# Patient Record
Sex: MALE | Race: WHITE | Employment: OTHER | URBAN - METROPOLITAN AREA
[De-identification: names, ages, dates, MRNs, and addresses within clinical notes are randomized per-mention and may not be internally consistent; named-entity substitution may affect disease eponyms.]

---

## 2017-01-06 DIAGNOSIS — Z95.810 ICD (IMPLANTABLE CARDIOVERTER-DEFIBRILLATOR) IN PLACE: ICD-10-CM

## 2017-01-06 DIAGNOSIS — E78.5 HYPERLIPIDEMIA, UNSPECIFIED HYPERLIPIDEMIA TYPE: ICD-10-CM

## 2017-01-06 DIAGNOSIS — I25.10 CARDIOVASCULAR DISEASE: ICD-10-CM

## 2017-01-06 DIAGNOSIS — I25.10 ATHEROSCLEROSIS OF NATIVE CORONARY ARTERY OF NATIVE HEART WITHOUT ANGINA PECTORIS: ICD-10-CM

## 2017-01-06 DIAGNOSIS — I47.29 PAROXYSMAL VENTRICULAR TACHYCARDIA: ICD-10-CM

## 2017-01-06 DIAGNOSIS — I42.9 CARDIOMYOPATHY (HCC): ICD-10-CM

## 2017-01-06 DIAGNOSIS — I25.9 ISCHEMIC HEART DISEASE: ICD-10-CM

## 2017-01-06 DIAGNOSIS — I47.1 PAT (PAROXYSMAL ATRIAL TACHYCARDIA) (HCC): ICD-10-CM

## 2017-01-06 DIAGNOSIS — I49.01 VENTRICULAR FIBRILLATION (HCC): ICD-10-CM

## 2017-01-06 DIAGNOSIS — I46.9 CARDIAC ARREST (HCC): ICD-10-CM

## 2017-01-06 RX ORDER — NITROGLYCERIN 400 UG/1
1 SPRAY ORAL
Qty: 1 BOTTLE | Refills: 3 | Status: SHIPPED | OUTPATIENT
Start: 2017-01-06 | End: 2018-01-05 | Stop reason: SDUPTHER

## 2017-02-02 DIAGNOSIS — I42.9 CARDIOMYOPATHY (HCC): ICD-10-CM

## 2017-02-02 DIAGNOSIS — I46.9 CARDIAC ARREST (HCC): ICD-10-CM

## 2017-02-02 DIAGNOSIS — I25.9 ISCHEMIC HEART DISEASE: ICD-10-CM

## 2017-02-02 DIAGNOSIS — I25.10 ATHEROSCLEROSIS OF NATIVE CORONARY ARTERY WITHOUT ANGINA PECTORIS, UNSPECIFIED WHETHER NATIVE OR TRANSPLANTED HEART: ICD-10-CM

## 2017-02-02 DIAGNOSIS — I49.01 VENTRICULAR FIBRILLATION (HCC): ICD-10-CM

## 2017-02-02 DIAGNOSIS — I25.10 CARDIOVASCULAR DISEASE: ICD-10-CM

## 2017-02-02 DIAGNOSIS — I47.29 PAROXYSMAL VENTRICULAR TACHYCARDIA: ICD-10-CM

## 2017-02-02 DIAGNOSIS — E78.5 HYPERLIPIDEMIA, UNSPECIFIED HYPERLIPIDEMIA TYPE: ICD-10-CM

## 2017-02-02 DIAGNOSIS — Z95.810 ICD (IMPLANTABLE CARDIOVERTER-DEFIBRILLATOR) IN PLACE: ICD-10-CM

## 2017-02-02 DIAGNOSIS — I47.1 PAT (PAROXYSMAL ATRIAL TACHYCARDIA) (HCC): ICD-10-CM

## 2017-02-02 RX ORDER — CLOPIDOGREL BISULFATE 75 MG/1
75 TABLET ORAL DAILY
Qty: 90 TAB | Refills: 1 | Status: SHIPPED | OUTPATIENT
Start: 2017-02-02 | End: 2017-08-02 | Stop reason: SDUPTHER

## 2017-02-02 NOTE — TELEPHONE ENCOUNTER
Requested Prescriptions     Signed Prescriptions Disp Refills    clopidogrel (PLAVIX) 75 mg tab 90 Tab 1     Sig: Take 1 Tab by mouth daily. Please make follow up appointment in March/2017  with Dr. John To. Authorizing Provider: Tanisha Calvo     Ordering User: Adams Harmon     Verbal order per Dr. Milan Snow.

## 2017-02-15 ENCOUNTER — CLINICAL SUPPORT (OUTPATIENT)
Dept: CARDIOLOGY CLINIC | Age: 67
End: 2017-02-15

## 2017-02-15 ENCOUNTER — OFFICE VISIT (OUTPATIENT)
Dept: CARDIOLOGY CLINIC | Age: 67
End: 2017-02-15

## 2017-02-15 VITALS
WEIGHT: 208 LBS | HEIGHT: 70 IN | BODY MASS INDEX: 29.78 KG/M2 | SYSTOLIC BLOOD PRESSURE: 140 MMHG | HEART RATE: 60 BPM | DIASTOLIC BLOOD PRESSURE: 82 MMHG

## 2017-02-15 DIAGNOSIS — Z95.1 HX OF CABG: ICD-10-CM

## 2017-02-15 DIAGNOSIS — I25.5 ISCHEMIC CARDIOMYOPATHY: ICD-10-CM

## 2017-02-15 DIAGNOSIS — I47.29 PAROXYSMAL VENTRICULAR TACHYCARDIA: ICD-10-CM

## 2017-02-15 DIAGNOSIS — J43.8 OTHER EMPHYSEMA (HCC): ICD-10-CM

## 2017-02-15 DIAGNOSIS — I47.1 PAT (PAROXYSMAL ATRIAL TACHYCARDIA) (HCC): ICD-10-CM

## 2017-02-15 DIAGNOSIS — Z85.118 HX OF CANCER OF LUNG: ICD-10-CM

## 2017-02-15 DIAGNOSIS — I25.10 ATHEROSCLEROSIS OF NATIVE CORONARY ARTERY OF NATIVE HEART WITHOUT ANGINA PECTORIS: ICD-10-CM

## 2017-02-15 DIAGNOSIS — Z95.810 PRESENCE OF AUTOMATIC CARDIOVERTER/DEFIBRILLATOR (AICD): Primary | ICD-10-CM

## 2017-02-15 DIAGNOSIS — Z95.810 ICD (IMPLANTABLE CARDIOVERTER-DEFIBRILLATOR) IN PLACE: Primary | ICD-10-CM

## 2017-02-15 NOTE — PROGRESS NOTES
Cardiac Electrophysiology Office Note     Subjective: Piper Martin is a 69 y.o. man with hx of VT on ICD   He had felt dyspnea when seeing Dr Monica Fagan test 10/2016 read by Dr Sasha Garber, mostly scars and LVEF 42%  He used steroid for COPD and got better    stress test 2/2010: mild rev ischemia in inferolateral wall mixed with mainly fixed defect, LVEF 49%  A self terminating episode of VT (in VF zone) in June, 2012 seen on AICD  He has been on sotalol and the last ICD check in January did not show ventricular tachycardia but rather he had several episodes of nonsustained atrial tachycardia and the longest episode was about 10 seconds. He was completely unaware of the atrial tachycardia episode. No chest pain, shortness of breath, or limitation in functional capacity. His NYHA class is I. There is no orthopnea or PND. The patient has not perceived an appropriate or inappropriate ICD shock since the last visit     ICD [place] [place] lead was examined under fluoroscopy. There was no evidence of lead breakdown. There is no evidence of electrical noises so far on the lead. This ICD lead is on recall.                           Record from Dr Alek Villavicencio: 3 cm left upper lobe lung cancer stage IB  ICD Riata lead with  KRISTEN 3/3/2010        Problem List  Date Reviewed: 2/15/2017          Codes Class Noted    Lung cancer, upper lobe ([facility] [address] ICD-10-CM: C34.10  ICD-9-CM: 162.3  9/27/2015    Overview Signed 9/27/2015  5:49 PM by Flavia Macedo MD      3 cm left upper lobe lung cancer stage IB 9/2015             PAT (paroxysmal atrial tachycardia) (HCC) ICD-10-CM: I47.1  ICD-9-CM: 427.0  Unknown        Encounter for long-term (current) use of high-risk medication ICD-10-CM: E12.053  ICD-9-CM: V58.69  7/27/2012    Overview Signed 7/27/2012  9:07 AM by Flavia Macedo MD     sotalol             Coronary atherosclerosis of native coronary artery ICD-10-CM: I25.10  ICD-9-CM: 414.01  4/26/2012    Overview Signed 10/3/2016 12:13 PM by Ryne Kemp MD     2/10 lexiscan cardiolyte, mostly fixed inferolateral defect. lvef 49%             Cardiomyopathy ([facility] [address] ICD-10-CM: I42.9  ICD-9-CM: 425.4  4/26/2012        Unspecified cardiovascular disease ICD-10-CM: I25.10  ICD-9-CM: 429.2  9/14/2010    Overview Signed 10/13/2010 12:52 PM by Angel Guillen MD     Mr. Dunaway Favorite cardiac history dates back to 1995 when he presented with unstable angina while living in [place] and underwent balloon angioplasty-vessel unknown. In June 2004, he had a cardiac arrest associated with an acute lateral MI. Cardiac catheterization at that time showed severe two vessel coronary disease and he had stents placed in both his obtuse marginal, and a week later in his right coronary artery. In January 2005, he had recurrent symptoms in association with an abnormal stress test and had a drug-eluting stent placed within the previously stented segment of his obtuse marginal branch. Last cardiac catheterization was in July 2005, and it showed him to have stable anatomy. He had an AICD implanted in 2004 after his out of hospital arrest. Repeat cath 2/10 show instent restenosis rca, treated at that time with BARB. LVEF 2/10 40-45%. Cardiac arrest [facility]) ICD-10-CM: I46.9  ICD-9-CM: 427.5  9/14/2010        Other specified forms of chronic ischemic heart disease ICD-10-CM: I25.89  ICD-9-CM: 414.8  9/14/2010        Automatic implantable cardiac defibrillator in situ ICD-10-CM: Z95.810  ICD-9-CM: V45.02  9/14/2010        Hyperlipidemia, unspecified ICD-10-CM: E78.5  ICD-9-CM: 272.4  9/14/2010        Paroxysmal ventricular tachycardia (HCC) ICD-10-CM: I47.2  ICD-9-CM: 427.1  9/14/2010        Ventricular fibrillation (HCC) ICD-10-CM: I49.01  ICD-9-CM: 427.41  9/14/2010                Current Outpatient Prescriptions   Medication Sig Dispense Refill    clopidogrel (PLAVIX) 75 mg tab Take 1 Tab by mouth daily.  Please make follow up appointment in March/2017  with Dr. Allen Willis. 90 Tab 1    nitroglycerin (NITROLINGUAL) 400 mcg/spray spray 1 Spray by SubLINGual route every five (5) minutes as needed. 1 Bottle 3    azelastine-fluticasone (DYMISTA) 137-50 mcg/spray spry by Nasal route.  sotalol (BETAPACE) 120 mg tablet Take 1 Tab by mouth two (2) times a day. 180 Tab 1    atorvastatin (LIPITOR) 80 mg tablet Take 1 Tab by mouth daily. 90 Tab 3    fluticasone-salmeterol (ADVAIR) 500-50 mcg/dose diskus inhaler Take 1 Puff by inhalation every twelve (12) hours.  docusate sodium (COLACE) 100 mg capsule Take 100 mg by mouth two (2) times a day.  tiotropium (SPIRIVA) 18 mcg inhalation capsule Take 2 Caps by inhalation daily.  tamsulosin (FLOMAX) 0.4 mg capsule Take 0.4 mg by mouth daily.  OTHER,NON-FORMULARY, Take 1 g by mouth every evening. Indications: 2 units every evening for immune support      B.infantis-B.ani-B.long-B.bifi (PROBIOTIC 4X) 10-15 mg TbEC Take  by mouth two (2) times a day.  omega-3 fatty acids-vitamin e (FISH OIL) 1,000 mg cap Take 1 Cap by mouth daily. 90 Cap 3    aspirin 81 mg tablet Take 81 mg by mouth daily.  zolpidem (AMBIEN) 10 mg tablet Take  by mouth nightly as needed.  multivitamin (ONE A DAY) tablet Take 1 Tab by mouth daily.        No Known Allergies  Past Medical History   Diagnosis Date    Automatic implantable cardiac defibrillator in situ 9/14/2010    Cardiac arrest ([address] 9/14/2010    ICD (implantable cardiac defibrillator) in place     Other and unspecified hyperlipidemia 9/14/2010    Other specified forms of chronic ischemic heart disease 9/14/2010    Paroxysmal ventricular tachycardia ([address] 9/14/2010    PAT (paroxysmal atrial tachycardia) ([address]     Unspecified cardiovascular disease 9/14/2010    Ventricular fibrillation ([address] 9/14/2010     Past Surgical History   Procedure Laterality Date    Hx cholecystectomy       6/07    Pr nasal surg proc unlisted       polyps and deviated septum    Hx heart catheterization      Xr fluoroscopy under 60 minutes  10/3/2012             Social History   Substance Use Topics    Smoking status: Former Smoker     Quit date: 7/12/2004    Smokeless tobacco: Never Used    Alcohol use No      Comment: occasional        Review of Systems:   Constitutional: Negative for fever, chills, weight loss, malaise/fatigue. HEENT: Negative for nosebleeds, vision changes. Respiratory: Negative for cough, and wheezing. Cardiovascular: Negative for chest pain, palpitations, orthopnea, claudication, leg swelling, syncope, and PND. Gastrointestinal: Negative for nausea, vomiting, diarrhea, melena. Genitourinary: Negative for hematuria. Musculoskeletal: Negative for myalgias, arthralgia. Skin: Negative for rash. Heme: Does not bleed or bruise easily. Neurological: Negative for speech change and focal weakness     Objective:     Visit Vitals    /82 (BP 1 Location: Right arm, BP Patient Position: Sitting)    Pulse 60    Ht 5' 10\" (1.778 m)    Wt 208 lb (94.3 kg)    BMI 29.84 kg/m2      Physical Exam:   Constitutional: well-developed and well-nourished. No distress. Head: Normocephalic and atraumatic. Eyes: Pupils are equal, round  Neck: supple. No JVD present. Cardiovascular: Normal rate, regular rhythm and normal heart sounds. Exam reveals no gallop and no friction rub. No murmur heard. Pulmonary/Chest: Effort normal and breath sounds normal. No wheezes. Abdominal: Soft, obese  Musculoskeletal: no edema. Neurological: alert,oriented. Skin: Skin is warm and dry at ICD site  Psychiatric: normal mood and affect. Behavior is normal. Judgment and thought content normal.      ECG atrial pacing  ventricular sensing    Assessment/Plan:       ICD-10-CM ICD-9-CM    1. ICD (implantable cardioverter-defibrillator) in place Z95.810 V45.02    2.  Atherosclerosis of native coronary artery of native heart without angina pectoris I25.10 414.01 AMB POC EKG ROUTINE W/ 12 LEADS, INTER & REP   3. PAT (paroxysmal atrial tachycardia) (HCC) I47.1 427.0    4. Paroxysmal ventricular tachycardia (HCC) I47.2 427.1    5. Hx of CABG Z95.1 V45.81    6. Ischemic cardiomyopathy I25.5 414.8    7. Other emphysema ([address] J43.8 492.8    8. Hx of cancer of lung Z85. 118 V10.11           The patient will get medication optimization when seeing Dr Fátima Villarreal  He thought his LVEF was lower and may not be accurate  He has not had arrhythmia  Used to have PVCs and PAT  He wants to stay on sotalol  He gets disability from [place] from agent orange exposure in [facility].  COPD is dx by his pulmonologist  patient understands and would like to continue to monitor in office as usual.  He does not want ICD Riata lead extracted and replaced at this time  His ICD will be checked today and he will replace when needed  His battery of ICD is about 1.5 years left    Follow-up Disposition:  Return in about 1 year (around 2/15/2018). Yasemin Horton M.D.   Electrophysiology/Cardiology  [facility] [place]  [address] , [address]  [phone]

## 2017-02-15 NOTE — MR AVS SNAPSHOT
Visit Information Date & Time Provider Department Dept. Phone Encounter #  
 2/15/2017 11:00 AM Peter Boyer MD [facility] [place] [facility] [phone] [id number] Follow-up Instructions Return in about 1 year (around 2/15/2018). Upcoming Health Maintenance Date Due Hepatitis C Screening 1950 DTaP/Tdap/Td series (1 - Tdap) 2/6/1969 FOBT Q 1 YEAR AGE 50-75 2/6/1998 ZOSTER VACCINE AGE 60> 2/6/2008 GLAUCOMA SCREENING Q2Y 2/6/2013 Pneumococcal 65+ High/Highest Risk (1 of 2 - PCV13) 2/6/2013 MEDICARE YEARLY EXAM 2/6/2013 INFLUENZA AGE 9 TO ADULT 8/1/2016 Allergies as of 2/15/2017  Review Complete On: 2/15/2017 By: Peter Boyer MD  
 No Known Allergies Current Immunizations  Never Reviewed No immunizations on file. Not reviewed this visit You Were Diagnosed With   
  
 Codes Comments ICD (implantable cardioverter-defibrillator) in place    -  Primary ICD-10-CM: Z95.810 ICD-9-CM: V45.02 Atherosclerosis of native coronary artery of native heart without angina pectoris     ICD-10-CM: I25.10 ICD-9-CM: 414.01   
 PAT (paroxysmal atrial tachycardia) (HCC)     ICD-10-CM: I47.1 ICD-9-CM: 427.0 Paroxysmal ventricular tachycardia (HCC)     ICD-10-CM: I47.2 ICD-9-CM: 427.1 Hx of CABG     ICD-10-CM: Z95.1 ICD-9-CM: V45.81 Ischemic cardiomyopathy     ICD-10-CM: I25.5 ICD-9-CM: 414.8 Other emphysema ([facility] [address]     ICD-10-CM: J43.8 ICD-9-CM: 492.8 Hx of cancer of lung     ICD-10-CM: Z85.118 
ICD-9-CM: V10.11 Vitals BP Pulse Height(growth percentile) Weight(growth percentile) BMI Smoking Status 140/82 (BP 1 Location: Right arm, BP Patient Position: Sitting) 60 5' 10\" (1.778 m) 208 lb (94.3 kg) 29.84 kg/m2 Former Smoker Vitals History BMI and BSA Data Body Mass Index Body Surface Area  
 29.84 kg/m 2 2.16 m 2 [facility] [phone] Your Updated Medication List  
  
   
This list is accurate as of: 2/15/17 11:12 AM.  Always use your most recent med list.  
  
  
  
  
 AMBIEN 10 mg tablet Generic drug:  zolpidem Take  by mouth nightly as needed. aspirin 81 mg tablet Take 81 mg by mouth daily. atorvastatin 80 mg tablet Commonly known as:  LIPITOR Take 1 Tab by mouth daily. clopidogrel 75 mg Tab Commonly known as:  PLAVIX Take 1 Tab by mouth daily. Please make follow up appointment in March/2017  with Dr. Anabel Baird. docusate sodium 100 mg capsule Commonly known as:  [place]ermell Nones Take 100 mg by mouth two (2) times a day. DYMISTA 137-50 mcg/spray [place] Generic drug:  azelastine-fluticasone  
by Nasal route. fluticasone-salmeterol 500-50 mcg/dose diskus inhaler Commonly known as:  ADVAIR Take 1 Puff by inhalation every twelve (12) hours. multivitamin tablet Commonly known as:  ONE A DAY Take 1 Tab by mouth daily. nitroglycerin 400 mcg/spray spray Commonly known as:  NITROLINGUAL  
1 [place] by SubLINGual route every five (5) minutes as needed. omega-3 fatty acids-vitamin e 1,000 mg Cap Commonly known as:  FISH OIL Take 1 Cap by mouth daily. OTHER(NON-FORMULARY) Take 1 g by mouth every evening. Indications: 2 units every evening for immune support PROBIOTIC 4X 10-15 mg Tbec Generic drug:  B.infantis-B.ani-B.long-B.bifi Take  by mouth two (2) times a day. sotalol 120 mg tablet Commonly known as:  Gretchen Ricks Take 1 Tab by mouth two (2) times a day. tamsulosin 0.4 mg capsule Commonly known as:  FLOMAX Take 0.4 mg by mouth daily. tiotropium 18 mcg inhalation capsule Commonly known as:  [place] Take 2 Caps by inhalation daily. We Performed the Following AMB POC EKG ROUTINE W/ 12 LEADS, INTER & REP [54408 CPT(R)] Follow-up Instructions Return in about 1 year (around 2/15/2018). Patient Instructions Follow up with Dr. Sean Escalante in 1 year. Introducing [facility] SERVICES! Dear Chito Ashby: 
Thank you for requesting a Kasumi-sou account. Our records indicate that you already have an active Kasumi-sou account. You can access your account anytime at [email or web address] Did you know that you can access your hospital and ER discharge instructions at any time in Kasumi-sou? You can also review all of your test results from your hospital stay or ER visit. Additional Information If you have questions, please visit the Frequently Asked Questions section of the Kasumi-sou website at [email or web address] Remember, Kasumi-sou is NOT to be used for urgent needs. For medical emergencies, dial 911. Now available from your iPhone and Android! Please provide this summary of care documentation to your next provider. Your primary care clinician is listed as Kristin Goodell. If you have any questions after today's visit, please call [phone].

## 2017-04-05 ENCOUNTER — OFFICE VISIT (OUTPATIENT)
Dept: CARDIOLOGY CLINIC | Age: 67
End: 2017-04-05

## 2017-04-05 VITALS
HEART RATE: 69 BPM | OXYGEN SATURATION: 95 % | SYSTOLIC BLOOD PRESSURE: 114 MMHG | BODY MASS INDEX: 30.9 KG/M2 | RESPIRATION RATE: 16 BRPM | DIASTOLIC BLOOD PRESSURE: 64 MMHG | HEIGHT: 70 IN | WEIGHT: 215.8 LBS

## 2017-04-05 DIAGNOSIS — Z95.810 IMPLANTABLE CARDIOVERTER-DEFIBRILLATOR (ICD) IN SITU: ICD-10-CM

## 2017-04-05 DIAGNOSIS — E78.5 HYPERLIPIDEMIA, UNSPECIFIED: ICD-10-CM

## 2017-04-05 DIAGNOSIS — Z95.1 HX OF CABG: ICD-10-CM

## 2017-04-05 DIAGNOSIS — I25.10 ATHEROSCLEROSIS OF NATIVE CORONARY ARTERY OF NATIVE HEART WITHOUT ANGINA PECTORIS: Primary | ICD-10-CM

## 2017-04-05 DIAGNOSIS — I25.5 ISCHEMIC CARDIOMYOPATHY: ICD-10-CM

## 2017-04-05 RX ORDER — LANOLIN ALCOHOL/MO/W.PET/CERES
1000 CREAM (GRAM) TOPICAL DAILY
Status: ON HOLD | COMMUNITY
End: 2017-10-02

## 2017-04-05 NOTE — MR AVS SNAPSHOT
Visit Information Date & Time Provider Department Dept. Phone Encounter #  
 4/5/2017  2:00 PM Mayuri Pang MD [facility] [place] [phone] [id number] Follow-up Instructions Return in about 6 months (around 10/5/2017). Your Appointments 3/8/2018 10:15 AM  
PACEMAKER with [id number] (ZARINA SCHEDULING) Appt Note: sjm icd, rc annual thresh/M, see Berrios  $0CP  pascual 2/15/17  
 [address] [place]  
[address] [address] [phone]  
  
    
 3/8/2018 10:20 AM  
ESTABLISHED PATIENT with Ishaan Bell MD  
[facility] ([address]) Appt Note: sjm icd, rc annual thresh/M, see Berrios  $0CP  pascual 2/15/17  
 [address] [id number]  
[address] [address] [phone]  
  
    
  
 5/23/2017 10:30 AM  
REMOTE OFFICE VISIT with Lorene Singh [facility] (ZARINA SCHEDULING) Appt Note: sjm icd, rc b 2/15/17  
 [address]  
[address] [address] [phone]  
  
    
 8/29/2017 11:00 AM  
REMOTE OFFICE VISIT with Lorene [facility] (ZARINA SCHEDULING) Appt Note: sjm icd, rc  
 [address] [facility] [address]  
[phone]  
  
    
 12/5/2017 10:15 AM  
REMOTE OFFICE VISIT with Lorene [facility] (ZARINA SCHEDULING) Appt Note: sjm icd, rc  
 [address] [facility] [address]  
[phone] Upcoming Health Maintenance Date Due Hepatitis C Screening 1950 DTaP/Tdap/Td series (1 - Tdap) 2/6/1969 FOBT Q 1 YEAR AGE 50-75 2/6/1998 ZOSTER VACCINE AGE 60> 2/6/2008 GLAUCOMA SCREENING Q2Y 2/6/2013 Pneumococcal 65+ High/Highest Risk (1 of 2 - PCV13) 2/6/2013 MEDICARE YEARLY EXAM 2/6/2013 INFLUENZA AGE 9 TO ADULT 8/1/2016 Allergies as of 4/5/2017  Review Complete On: 4/5/2017 By: Garwin Lefort, MD  
 No Known Allergies Current Immunizations  Never Reviewed No immunizations on file. Not reviewed this visit You Were Diagnosed With   
  
 Codes Comments Atherosclerosis of native coronary artery of native heart without angina pectoris    -  Primary ICD-10-CM: I25.10 ICD-9-CM: 414.01 Ischemic cardiomyopathy     ICD-10-CM: I25.5 ICD-9-CM: 414.8 Hyperlipidemia, unspecified     ICD-10-CM: E78.5 ICD-9-CM: 272.4 Implantable cardioverter-defibrillator (ICD) in situ     ICD-10-CM: Z95.810 ICD-9-CM: V45.02 Hx of CABG     ICD-10-CM: Z95.1 ICD-9-CM: V45.81 Vitals BP Pulse Resp Height(growth percentile) Weight(growth percentile) SpO2  
 114/64 (BP 1 Location: Left arm, BP Patient Position: Sitting) 69 16 5' 10\" (1.778 m) 215 lb 12.8 oz (97.9 kg) 95% BMI Smoking Status 30.96 kg/m2 Former Smoker Vitals History BMI and BSA Data Body Mass Index Body Surface Area 30.96 kg/m 2 2.2 m 2 Preferred Pharmacy Pharmacy Name Phone [address], [facility] [phone] Your Updated Medication List  
  
   
This list is accurate as of: 4/5/17  2:15 PM.  Always use your most recent med list.  
  
  
  
  
 AMBIEN 10 mg tablet Generic drug:  zolpidem Take  by mouth nightly as needed. aspirin 81 mg tablet Take 81 mg by mouth daily. atorvastatin 80 mg tablet Commonly known as:  LIPITOR Take 1 Tab by mouth daily. clopidogrel 75 mg Tab Commonly known as:  PLAVIX Take 1 Tab by mouth daily. Please make follow up appointment in March/2017  with Dr. Kathi Cade. cyanocobalamin 1,000 mcg tablet Take 1,000 mcg by mouth daily. docusate sodium 100 mg capsule Commonly known as:  [place] Take 100 mg by mouth two (2) times a day. DYMISTA 137-50 mcg/spray [place] Generic drug:  azelastine-fluticasone  
by Nasal route two (2) times a day. fluticasone-salmeterol 500-50 mcg/dose diskus inhaler Commonly known as:  ADVAIR Take 1 Puff by inhalation every twelve (12) hours. multivitamin tablet Commonly known as:  ONE A DAY Take 1 Tab by mouth daily. nitroglycerin 400 mcg/spray spray Commonly known as:  NITROLINGUAL  
1 [place] by SubLINGual route every five (5) minutes as needed. omega-3 fatty acids-vitamin e 1,000 mg Cap Commonly known as:  FISH OIL Take 1 Cap by mouth daily. OTHER(NON-FORMULARY) Take 1 g by mouth every evening. Indications: 2 units every evening for immune support PROBIOTIC 4X 10-15 mg Tbec Generic drug:  B.infantis-B.ani-B.long-B.bifi Take  by mouth two (2) times a day. sotalol 120 mg tablet Commonly known as:  Theresa Bennett Take 1 Tab by mouth two (2) times a day. tamsulosin 0.4 mg capsule Commonly known as:  FLOMAX Take 0.4 mg by mouth daily. tiotropium 18 mcg inhalation capsule Commonly known as:  [place] Apa Take 2 Caps by inhalation daily. Follow-up Instructions Return in about 6 months (around 10/5/2017). Introducing [facility] & HEALTH SERVICES! Dear Raisa Kincaid: 
Thank you for requesting a GetGoing account. Our records indicate that you already have an active GetGoing account. You can access your account anytime at [email or web address] Did you know that you can access your hospital and ER discharge instructions at any time in GetGoing? You can also review all of your test results from your hospital stay or ER visit. Additional Information If you have questions, please visit the Frequently Asked Questions section of the GetGoing website at [email or web address] Remember, Wireless Dynamicst is NOT to be used for urgent needs. For medical emergencies, dial 911. Now available from your iPhone and Android! Please provide this summary of care documentation to your next provider. Your primary care clinician is listed as Sandi Mata. If you have any questions after today's visit, please call [phone].

## 2017-04-05 NOTE — LETTER
4/12/2017 9:39 AM 
 
Mr. Mars Robles [address] [facility] [address] [phone] Dear Mars Robles: 
 
Please find your most recent results below. Resulted Orders LIPID PANEL Result Value Ref Range Cholesterol, total 133 100 - 199 mg/dL Triglyceride 107 0 - 149 mg/dL HDL Cholesterol 39 (L) >39 mg/dL VLDL, calculated 21 5 - 40 mg/dL LDL, calculated 73 0 - 99 mg/dL Narrative Performed at:  [address]  [id number] : Little Corley MD, Phone:  [phone] METABOLIC PANEL, COMPREHENSIVE Result Value Ref Range Glucose 137 (H) 65 - 99 mg/dL BUN 15 8 - 27 mg/dL Creatinine 0.92 0.76 - 1.27 mg/dL GFR est non-AA 85 >59 mL/min/1.73 GFR est AA 98 >59 mL/min/1.73  
 BUN/Creatinine ratio 16 10 - 24 Sodium 139 134 - 144 mmol/L Potassium 4.6 3.5 - 5.2 mmol/L Chloride 100 96 - 106 mmol/L  
 CO2 24 18 - 29 mmol/L Calcium 9.0 8.6 - 10.2 mg/dL Protein, total 5.9 (L) 6.0 - 8.5 g/dL Albumin 3.9 3.6 - 4.8 g/dL GLOBULIN, TOTAL 2.0 1.5 - 4.5 g/dL A-G Ratio 2.0 1.2 - 2.2 Bilirubin, total 0.4 0.0 - 1.2 mg/dL Alk. phosphatase 87 39 - 117 IU/L  
 AST (SGOT) 17 0 - 40 IU/L  
 ALT (SGPT) 15 0 - 44 IU/L Narrative Performed at:  [address]  [id number] : Little Corley MD, Phone:  [phone] CVD REPORT Result Value Ref Range INTERPRETATION Note Comment:  
   Supplement report is available. Narrative Performed at:  [address] [address] [place], [place]  [id number] : Bridgett Perera PhD, Phone:  [phone] RECOMMENDATIONS: Overall, your cholesterol is okay. Your good cholesterol (HDL) is a little low and your glucose is elevated. You should try to work on exercise and weight loss.  Stay on medications and repeat labs in 6 months. Please call me if you have any questions: [phone] Sincerely, MD Cortney Gorman., RN

## 2017-04-05 NOTE — PROGRESS NOTES
HISTORY OF PRESENT ILLNESS  Rudi Neumann is a 69 y.o. male     SUMMARY:   Problem List  Date Reviewed: 4/5/2017          Codes Class Noted    Lung cancer, upper lobe ([facility] [address] ICD-10-CM: C34.10  ICD-9-CM: 162.3  9/27/2015    Overview Signed 9/27/2015  5:49 PM by Bill Jaffe MD      3 cm left upper lobe lung cancer stage IB 9/2015             PAT (paroxysmal atrial tachycardia) (HCC) ICD-10-CM: I47.1  ICD-9-CM: 427.0  Unknown        Encounter for long-term (current) use of high-risk medication ICD-10-CM: Z79.899  ICD-9-CM: V58.69  7/27/2012    Overview Signed 7/27/2012  9:07 AM by Bill Jaffe MD     sotalol             Coronary atherosclerosis of native coronary artery ICD-10-CM: I25.10  ICD-9-CM: 414.01  4/26/2012    Overview Addendum 4/5/2017  7:00 AM by Sanju Corrales MD     2/10 lexiscan cardiolyte, mostly fixed inferolateral defect. lvef 49%  9/16 lexiscan cardiolyte, mostly fixed inferolateral defect  lvef 42%             [facility] ICD-10-CM: I42.9  ICD-9-CM: 425.4  4/26/2012        Unspecified cardiovascular disease ICD-10-CM: I25.10  ICD-9-CM: 429.2  9/14/2010    Overview Signed 10/13/2010 12:52 PM by Saray Enrique MD     Mr. Max Jyoti cardiac history dates back to 1995 when he presented with unstable angina while living in [place] and underwent balloon angioplasty-vessel unknown. In June 2004, he had a cardiac arrest associated with an acute lateral MI. Cardiac catheterization at that time showed severe two vessel coronary disease and he had stents placed in both his obtuse marginal, and a week later in his right coronary artery. In January 2005, he had recurrent symptoms in association with an abnormal stress test and had a drug-eluting stent placed within the previously stented segment of his obtuse marginal branch. Last cardiac catheterization was in July 2005, and it showed him to have stable anatomy.  He had an AICD implanted in 2004 after his out of hospital arrest. Repeat cath 2/10 show instent restenosis rca, treated at that time with BARB. LVEF 2/10 40-45%. Cardiac arrest [facility]) ICD-10-CM: I46.9  ICD-9-CM: 427.5  9/14/2010        Other specified forms of chronic ischemic heart disease ICD-10-CM: I25.89  ICD-9-CM: 414.8  9/14/2010        Implantable cardioverter-defibrillator (ICD) in situ ICD-10-CM: Z95.810  ICD-9-CM: V45.02  9/14/2010        Hyperlipidemia, unspecified ICD-10-CM: E78.5  ICD-9-CM: 272.4  9/14/2010        Paroxysmal ventricular tachycardia (HCC) ICD-10-CM: I47.2  ICD-9-CM: 427.1  9/14/2010        Ventricular fibrillation (HCC) ICD-10-CM: I49.01  ICD-9-CM: 427.41  9/14/2010              Current Outpatient Prescriptions on File Prior to Visit   Medication Sig    clopidogrel (PLAVIX) 75 mg tab Take 1 Tab by mouth daily. Please make follow up appointment in March/2017  with Dr. Adam Valente.  nitroglycerin (NITROLINGUAL) 400 mcg/spray spray 1 Spray by SubLINGual route every five (5) minutes as needed.  azelastine-fluticasone (DYMISTA) 137-50 mcg/spray spry by Nasal route two (2) times a day.  sotalol (BETAPACE) 120 mg tablet Take 1 Tab by mouth two (2) times a day.  atorvastatin (LIPITOR) 80 mg tablet Take 1 Tab by mouth daily.  fluticasone-salmeterol (ADVAIR) 500-50 mcg/dose diskus inhaler Take 1 Puff by inhalation every twelve (12) hours.  docusate sodium (COLACE) 100 mg capsule Take 100 mg by mouth two (2) times a day.  tiotropium (SPIRIVA) 18 mcg inhalation capsule Take 2 Caps by inhalation daily.  tamsulosin (FLOMAX) 0.4 mg capsule Take 0.4 mg by mouth daily.  OTHER,NON-FORMULARY, Take 1 g by mouth every evening. Indications: 2 units every evening for immune support    multivitamin (ONE A DAY) tablet Take 1 Tab by mouth daily.  B.infantis-B.ani-B.long-B.bifi (PROBIOTIC 4X) 10-15 mg TbEC Take  by mouth two (2) times a day.  omega-3 fatty acids-vitamin e (FISH OIL) 1,000 mg cap Take 1 Cap by mouth daily.     aspirin 81 mg tablet Take 81 mg by mouth daily.  zolpidem (AMBIEN) 10 mg tablet Take  by mouth nightly as needed. No current facility-administered medications on file prior to visit. CARDIOLOGY STUDIES TO DATE:  2/10 lexiscan cardiolyte, mostly fixed inferolateral defect. lvef 49%  9/16 lexiscan cardiolyte, mostly fixed inferolateral defect  lvef 42%    Chief Complaint   Patient presents with    Coronary Artery Disease     HPI :  Mr. Aruna Muir is doing well. No worrisome cardiac symptoms and no problems with medications. He has not had his lipids checked since October. He is still bothered by some shortness of breath and has now clearly been diagnosed with COPD as well as sleep apnea and he is getting regular pulmonary follow up for that. He is concerned about his wife who is having some anxiety and panic attacks primarily related to some family health issues. He continues to gain weight and is not getting any regular exercise. CARDIAC ROS:   negative for chest pain, palpitations, syncope, orthopnea, paroxysmal nocturnal dyspnea, exertional chest pressure/discomfort, claudication, lower extremity edema    Family History   Problem Relation Age of Onset    Heart Disease Neg Hx        Past Medical History:   Diagnosis Date    Automatic implantable cardiac defibrillator in situ 9/14/2010    Cardiac arrest ([address] 9/14/2010    ICD (implantable cardiac defibrillator) in place     Other and unspecified hyperlipidemia 9/14/2010    Other specified forms of chronic ischemic heart disease 9/14/2010    Paroxysmal ventricular tachycardia ([address] 9/14/2010    PAT (paroxysmal atrial tachycardia) ([address]     Unspecified cardiovascular disease 9/14/2010    Ventricular fibrillation ([address] 9/14/2010       GENERAL ROS:  A comprehensive review of systems was negative except for that written in the HPI.     Visit Vitals    /64 (BP 1 Location: Left arm, BP Patient Position: Sitting)    Pulse 69    Resp 16    Ht 5' 10\" (1.778 m)    Wt 215 lb 12.8 oz (97.9 kg)    SpO2 95%    BMI 30.96 kg/m2       Wt Readings from Last 3 Encounters:   04/05/17 215 lb 12.8 oz (97.9 kg)   02/15/17 208 lb (94.3 kg)   09/15/16 207 lb (93.9 kg)            BP Readings from Last 3 Encounters:   04/05/17 114/64   02/15/17 140/82   09/15/16 124/82       PHYSICAL EXAM  General appearance: alert, cooperative, no distress, appears stated age  Neck: supple, symmetrical, trachea midline, no adenopathy, no carotid bruit and no JVD  Lungs: clear to auscultation bilaterally  Heart: regular rate and rhythm, S1, S2 normal, no murmur, click, rub or gallop  Extremities: extremities normal, atraumatic, no cyanosis or edema    Lab Results   Component Value Date/Time    Cholesterol, total 144 10/27/2016 08:20 AM    Cholesterol, total 153 04/25/2016 07:52 AM    Cholesterol, total 199 01/15/2016 08:24 AM    Cholesterol, total 147 01/02/2015 07:22 AM    Cholesterol, total 131 06/17/2014 07:16 AM    HDL Cholesterol 45 10/27/2016 08:20 AM    HDL Cholesterol 50 04/25/2016 07:52 AM    HDL Cholesterol 53 01/15/2016 08:24 AM    HDL Cholesterol 54 01/02/2015 07:22 AM    HDL Cholesterol 50 06/17/2014 07:16 AM    LDL, calculated 81 10/27/2016 08:20 AM    LDL, calculated 83 04/25/2016 07:52 AM    LDL, calculated 118 01/15/2016 08:24 AM    LDL, calculated 75 01/02/2015 07:22 AM    LDL, calculated 62 06/17/2014 07:16 AM    Triglyceride 92 10/27/2016 08:20 AM    Triglyceride 102 04/25/2016 07:52 AM    Triglyceride 141 01/15/2016 08:24 AM    Triglyceride 91 01/02/2015 07:22 AM    Triglyceride 96 06/17/2014 07:16 AM     ASSESSMENT  Mr. Sam Partida is stable, asymptomatic and well compensated from a cardiac standpoint on a good medical regimen. He needs no cardiac testing at this time. We will send him for a fasting lipid profile. We talked about the importance of weight loss in terms of his lipids, sleep apnea and his shortness of breath.        current treatment plan is effective, no change in therapy  lab results and schedule of future lab studies reviewed with patient    Encounter Diagnoses   Name Primary?  Atherosclerosis of native coronary artery of native heart without angina pectoris Yes    Ischemic cardiomyopathy     Hyperlipidemia, unspecified     Implantable cardioverter-defibrillator (ICD) in situ     Hx of CABG      Orders Placed This Encounter    cyanocobalamin 1,000 mcg tablet       Follow-up Disposition:  Return in about 6 months (around 10/5/2017).     Herlinda Villa MD  4/5/2017

## 2017-04-11 ENCOUNTER — HOSPITAL ENCOUNTER (OUTPATIENT)
Dept: LAB | Age: 67
Discharge: HOME | End: 2017-04-11
Payer: MEDICARE

## 2017-04-11 PROCEDURE — 80053 COMPREHEN METABOLIC PANEL: CPT

## 2017-04-11 PROCEDURE — 80061 LIPID PANEL: CPT

## 2017-04-11 PROCEDURE — 36415 COLL VENOUS BLD VENIPUNCTURE: CPT

## 2017-04-12 LAB
ALBUMIN SERPL-MCNC: 3.9 G/DL (ref 3.6–4.8)
ALBUMIN/GLOB SERPL: 2 {RATIO} (ref 1.2–2.2)
ALP SERPL-CCNC: 87 IU/L (ref 39–117)
ALT SERPL-CCNC: 15 IU/L (ref 0–44)
AST SERPL-CCNC: 17 IU/L (ref 0–40)
BILIRUB SERPL-MCNC: 0.4 MG/DL (ref 0–1.2)
BUN SERPL-MCNC: 15 MG/DL (ref 8–27)
BUN/CREAT SERPL: 16 (ref 10–24)
CALCIUM SERPL-MCNC: 9 MG/DL (ref 8.6–10.2)
CHLORIDE SERPL-SCNC: 100 MMOL/L (ref 96–106)
CHOLEST SERPL-MCNC: 133 MG/DL (ref 100–199)
CO2 SERPL-SCNC: 24 MMOL/L (ref 18–29)
CREAT SERPL-MCNC: 0.92 MG/DL (ref 0.76–1.27)
GLOBULIN SER CALC-MCNC: 2 G/DL (ref 1.5–4.5)
GLUCOSE SERPL-MCNC: 137 MG/DL (ref 65–99)
HDLC SERPL-MCNC: 39 MG/DL
INTERPRETATION, 910389: NORMAL
LDLC SERPL CALC-MCNC: 73 MG/DL (ref 0–99)
POTASSIUM SERPL-SCNC: 4.6 MMOL/L (ref 3.5–5.2)
PROT SERPL-MCNC: 5.9 G/DL (ref 6–8.5)
SODIUM SERPL-SCNC: 139 MMOL/L (ref 134–144)
TRIGL SERPL-MCNC: 107 MG/DL (ref 0–149)
VLDLC SERPL CALC-MCNC: 21 MG/DL (ref 5–40)

## 2017-04-19 ENCOUNTER — TELEPHONE (OUTPATIENT)
Dept: CARDIOLOGY CLINIC | Age: 67
End: 2017-04-19

## 2017-04-19 NOTE — TELEPHONE ENCOUNTER
Received letter from [facility] stating that the patient is to have an endoscopy and colonoscopy and needs device management form. Form filled out and will have MD sign.

## 2017-05-15 ENCOUNTER — OP HISTORICAL/CONVERTED ENCOUNTER (OUTPATIENT)
Dept: OTHER | Age: 67
End: 2017-05-15

## 2017-05-23 ENCOUNTER — OFFICE VISIT (OUTPATIENT)
Dept: CARDIOLOGY CLINIC | Age: 67
End: 2017-05-23

## 2017-05-23 ENCOUNTER — OP HISTORICAL/CONVERTED ENCOUNTER (OUTPATIENT)
Dept: OTHER | Age: 67
End: 2017-05-23

## 2017-05-23 DIAGNOSIS — Z95.810 PRESENCE OF AUTOMATIC CARDIOVERTER/DEFIBRILLATOR (AICD): Primary | ICD-10-CM

## 2017-07-07 ENCOUNTER — OP HISTORICAL/CONVERTED ENCOUNTER (OUTPATIENT)
Dept: OTHER | Age: 67
End: 2017-07-07

## 2017-07-19 ENCOUNTER — OP HISTORICAL/CONVERTED ENCOUNTER (OUTPATIENT)
Dept: OTHER | Age: 67
End: 2017-07-19

## 2017-07-26 ENCOUNTER — OP HISTORICAL/CONVERTED ENCOUNTER (OUTPATIENT)
Dept: OTHER | Age: 67
End: 2017-07-26

## 2017-08-01 ENCOUNTER — OP HISTORICAL/CONVERTED ENCOUNTER (OUTPATIENT)
Dept: OTHER | Age: 67
End: 2017-08-01

## 2017-08-30 ENCOUNTER — OFFICE VISIT (OUTPATIENT)
Dept: CARDIOLOGY CLINIC | Age: 67
End: 2017-08-30

## 2017-08-30 DIAGNOSIS — Z95.810 PRESENCE OF AUTOMATIC CARDIOVERTER/DEFIBRILLATOR (AICD): Primary | ICD-10-CM

## 2017-09-01 ENCOUNTER — OP HISTORICAL/CONVERTED ENCOUNTER (OUTPATIENT)
Dept: OTHER | Age: 67
End: 2017-09-01

## 2017-09-14 ENCOUNTER — OFFICE VISIT (OUTPATIENT)
Dept: CARDIOLOGY CLINIC | Age: 67
End: 2017-09-14

## 2017-09-14 ENCOUNTER — DOCUMENTATION ONLY (OUTPATIENT)
Dept: CARDIOLOGY CLINIC | Age: 67
End: 2017-09-14

## 2017-09-14 ENCOUNTER — TELEPHONE (OUTPATIENT)
Dept: CARDIOLOGY CLINIC | Age: 67
End: 2017-09-14

## 2017-09-14 DIAGNOSIS — Z95.810 PRESENCE OF AUTOMATIC CARDIOVERTER/DEFIBRILLATOR (AICD): Primary | ICD-10-CM

## 2017-09-15 ENCOUNTER — TELEPHONE (OUTPATIENT)
Dept: CARDIOLOGY CLINIC | Age: 67
End: 2017-09-15

## 2017-09-15 DIAGNOSIS — Z01.812 PRE-PROCEDURE LAB EXAM: ICD-10-CM

## 2017-09-15 DIAGNOSIS — I46.9 CARDIAC ARREST (HCC): Primary | ICD-10-CM

## 2017-09-15 DIAGNOSIS — I47.29 PAROXYSMAL VENTRICULAR TACHYCARDIA: ICD-10-CM

## 2017-09-15 DIAGNOSIS — I25.89 OTHER SPECIFIED FORMS OF CHRONIC ISCHEMIC HEART DISEASE: ICD-10-CM

## 2017-09-15 RX ORDER — CHLORHEXIDINE GLUCONATE 4 G/100ML
SOLUTION TOPICAL
Qty: 1 BOTTLE | Refills: 0 | Status: SHIPPED | OUTPATIENT
Start: 2017-09-15 | End: 2017-09-18 | Stop reason: SDUPTHER

## 2017-09-15 NOTE — PROGRESS NOTES
Spoke to patient and scheduled gen change for 10/2/17 at 1:00pm.  Prep given and letter with lab slip mailed. Patient verbalizes understanding. And will call with any questions or concerns.

## 2017-09-15 NOTE — LETTER
9/15/2017 12:59 PM 
 
Mr. Aileen Blanchard [address] [id number] [facility] [id number] Your ICD gen change procedure has been scheduled for 10/2/17 at 1:00pm, at [facility]. 
 
Please report to Admitting Department by 11;00am, or 2 hours prior to your scheduled procedure. Please bring a list of your current medications and medication bottles, if able, to the hospital on this day. You will be unable to drive after your procedure so please make sure to bring someone with you to your procedure. You will need to have nothing to eat or drink after 9:00am, the morning of your procedure. You may have small sips of water, if needed, to take with your medication. You will need labs drawn prior to your procedure. Please go to [facility] to have this done as soon as your able to. You should not stop your medications prior to your scheduled procedure. After your procedure, you will need to follow up with Dr. Cherri Simons. Your follow-up appointment has been scheduled for 10/19/17 at 3:00pm.  
 
Hibiclens 4% topical solution has been ordered and sent into your pharmacy Patient it start Hibiclens application 5 days prior to procedure date Directions Hibiclens 4%: Start cleanse 5 days prior to procedure 1. Rinse area (upper chest and upper arms) with water. 2. Apply minimum amount necessary to scrub the upper chest area from shoulder/neck to mid line of chest and to below the nipple each of  5 nights before the day of the procedure 3. Let solution dry.   
 
 
 
 
 
 
 
 
Sincerely, 
 
 
Anupama Carrillo MD

## 2017-09-18 ENCOUNTER — TELEPHONE (OUTPATIENT)
Dept: CARDIOLOGY CLINIC | Age: 67
End: 2017-09-18

## 2017-09-18 RX ORDER — CHLORHEXIDINE GLUCONATE 4 G/100ML
SOLUTION TOPICAL
Qty: 1 BOTTLE | Refills: 0 | Status: SHIPPED | OUTPATIENT
Start: 2017-09-18 | End: 2017-10-02

## 2017-09-18 NOTE — TELEPHONE ENCOUNTER
----- Message from King Carr RN sent at 9/18/2017 11:36 AM EDT -----  Regarding: FW: Prescription Question  Contact: [phone]      ----- Message -----     From: Jhoana Turpin     Sent: 9/18/2017  10:51 AM       To: [facility] [place]  Subject: Prescription Question                            My Rx for Antiseptic  was sent to wrong pharmacy. My local (one time) pharmacy is [facility] [id number] [address] [facility] [phone] 15. My maintenance Rx pharmacy is [facility]. Please cancel Rx at [facility] in [facility] and send to [place] [phone].

## 2017-09-27 ENCOUNTER — HOSPITAL ENCOUNTER (OUTPATIENT)
Dept: LAB | Age: 67
Discharge: HOME | End: 2017-09-27
Payer: MEDICARE

## 2017-09-27 PROCEDURE — 80061 LIPID PANEL: CPT

## 2017-09-27 PROCEDURE — 85025 COMPLETE CBC W/AUTO DIFF WBC: CPT

## 2017-09-27 PROCEDURE — 36415 COLL VENOUS BLD VENIPUNCTURE: CPT

## 2017-09-27 PROCEDURE — 80048 BASIC METABOLIC PNL TOTAL CA: CPT

## 2017-09-27 PROCEDURE — 80053 COMPREHEN METABOLIC PANEL: CPT

## 2017-09-28 ENCOUNTER — OFFICE VISIT (OUTPATIENT)
Dept: CARDIOLOGY CLINIC | Age: 67
End: 2017-09-28

## 2017-09-28 VITALS
HEART RATE: 64 BPM | WEIGHT: 225.8 LBS | HEIGHT: 70 IN | BODY MASS INDEX: 32.33 KG/M2 | DIASTOLIC BLOOD PRESSURE: 80 MMHG | RESPIRATION RATE: 16 BRPM | SYSTOLIC BLOOD PRESSURE: 130 MMHG

## 2017-09-28 DIAGNOSIS — Z95.810 IMPLANTABLE CARDIOVERTER-DEFIBRILLATOR (ICD) IN SITU: ICD-10-CM

## 2017-09-28 DIAGNOSIS — I25.10 ATHEROSCLEROSIS OF NATIVE CORONARY ARTERY OF NATIVE HEART WITHOUT ANGINA PECTORIS: Primary | ICD-10-CM

## 2017-09-28 DIAGNOSIS — E78.2 MIXED HYPERLIPIDEMIA: ICD-10-CM

## 2017-09-28 DIAGNOSIS — I25.5 ISCHEMIC CARDIOMYOPATHY: ICD-10-CM

## 2017-09-28 LAB
ALBUMIN SERPL-MCNC: 4.1 G/DL (ref 3.6–4.8)
ALBUMIN/GLOB SERPL: 2.3 {RATIO} (ref 1.2–2.2)
ALP SERPL-CCNC: 86 IU/L (ref 39–117)
ALT SERPL-CCNC: 15 IU/L (ref 0–44)
AST SERPL-CCNC: 19 IU/L (ref 0–40)
BASOPHILS # BLD AUTO: 0 X10E3/UL (ref 0–0.2)
BASOPHILS NFR BLD AUTO: 0 %
BILIRUB SERPL-MCNC: 0.4 MG/DL (ref 0–1.2)
BUN SERPL-MCNC: 18 MG/DL (ref 8–27)
BUN SERPL-MCNC: 18 MG/DL (ref 8–27)
BUN/CREAT SERPL: 21 (ref 10–24)
BUN/CREAT SERPL: 22 (ref 10–24)
CALCIUM SERPL-MCNC: 8.8 MG/DL (ref 8.6–10.2)
CALCIUM SERPL-MCNC: 9 MG/DL (ref 8.6–10.2)
CHLORIDE SERPL-SCNC: 101 MMOL/L (ref 96–106)
CHLORIDE SERPL-SCNC: 101 MMOL/L (ref 96–106)
CHOLEST SERPL-MCNC: 144 MG/DL (ref 100–199)
CO2 SERPL-SCNC: 25 MMOL/L (ref 18–29)
CO2 SERPL-SCNC: 25 MMOL/L (ref 18–29)
CREAT SERPL-MCNC: 0.82 MG/DL (ref 0.76–1.27)
CREAT SERPL-MCNC: 0.84 MG/DL (ref 0.76–1.27)
EOSINOPHIL # BLD AUTO: 0.1 X10E3/UL (ref 0–0.4)
EOSINOPHIL NFR BLD AUTO: 1 %
ERYTHROCYTE [DISTWIDTH] IN BLOOD BY AUTOMATED COUNT: 15.8 % (ref 12.3–15.4)
GLOBULIN SER CALC-MCNC: 1.8 G/DL (ref 1.5–4.5)
GLUCOSE SERPL-MCNC: 127 MG/DL (ref 65–99)
GLUCOSE SERPL-MCNC: 130 MG/DL (ref 65–99)
HCT VFR BLD AUTO: 37.3 % (ref 37.5–51)
HDLC SERPL-MCNC: 43 MG/DL
HGB BLD-MCNC: 12.2 G/DL (ref 12.6–17.7)
IMM GRANULOCYTES # BLD: 0 X10E3/UL (ref 0–0.1)
IMM GRANULOCYTES NFR BLD: 1 %
INTERPRETATION, 910389: NORMAL
LDLC SERPL CALC-MCNC: 80 MG/DL (ref 0–99)
LYMPHOCYTES # BLD AUTO: 0.7 X10E3/UL (ref 0.7–3.1)
LYMPHOCYTES NFR BLD AUTO: 17 %
MCH RBC QN AUTO: 33.8 PG (ref 26.6–33)
MCHC RBC AUTO-ENTMCNC: 32.7 G/DL (ref 31.5–35.7)
MCV RBC AUTO: 103 FL (ref 79–97)
MONOCYTES # BLD AUTO: 0.4 X10E3/UL (ref 0.1–0.9)
MONOCYTES NFR BLD AUTO: 8 %
NEUTROPHILS # BLD AUTO: 3.2 X10E3/UL (ref 1.4–7)
NEUTROPHILS NFR BLD AUTO: 73 %
PLATELET # BLD AUTO: 195 X10E3/UL (ref 150–379)
POTASSIUM SERPL-SCNC: 4.6 MMOL/L (ref 3.5–5.2)
POTASSIUM SERPL-SCNC: 4.6 MMOL/L (ref 3.5–5.2)
PROT SERPL-MCNC: 5.9 G/DL (ref 6–8.5)
RBC # BLD AUTO: 3.61 X10E6/UL (ref 4.14–5.8)
SODIUM SERPL-SCNC: 140 MMOL/L (ref 134–144)
SODIUM SERPL-SCNC: 142 MMOL/L (ref 134–144)
TRIGL SERPL-MCNC: 104 MG/DL (ref 0–149)
VLDLC SERPL CALC-MCNC: 21 MG/DL (ref 5–40)
WBC # BLD AUTO: 4.4 X10E3/UL (ref 3.4–10.8)

## 2017-09-28 RX ORDER — LORATADINE 10 MG/1
10 TABLET ORAL DAILY
COMMUNITY

## 2017-09-28 NOTE — MR AVS SNAPSHOT
Visit Information Date & Time Provider Department Dept. Phone Encounter #  
 9/28/2017  2:20 PM Faby Kovacs MD [facility] [place]una [phone] [id number] Follow-up Instructions Return in about 6 months (around 3/28/2018). Your Appointments 9/28/2017  2:20 PM  
ESTABLISHED PATIENT with Faby Kovacs MD  
[facility] ([address]) Appt Note: 6 month follow up; 6 month follow up pt r/s from [address] [place] [address]  
[address] [address]  
  
    
 10/19/2017  3:00 PM  
PACEMAKER with Rai Glover [facility] ([facility]) Appt Note: 2 week wound check [address] [place] [address]  
[address] [facility]  
  
    
 10/19/2017  3:00 PM  
ESTABLISHED PATIENT with Leonarda Ward MD  
[facility] ([facility]) Appt Note: 2 week wound check [address] [place] [address]  
[address] [address]  
  
    
 3/8/2018 10:15 AM  
PACEMAKER with Rai Glover [facility] ([facility] SCHEDULING) Appt Note: sjm icd, rc annual thresh/M, see Berrios  $0CP  pascual 2/15/17  
 [address] [address]  
[phone]  
  
    
 3/8/2018 10:20 AM  
ESTABLISHED PATIENT with Leonarda Ward MD  
[facility] ([address]) Appt Note: sjm icd, rc annual thresh/M, see Berrios  $0CP  pascual 2/15/17  
 [address] [place] [address]  
[phone]  
  
    
  
 12/5/2017 10:15 AM  
REMOTE OFFICE VISIT with Jessica Garcia [facility] ([facility] SCHEDULING) Appt Note: sjm icd, rc  
 [address] [facility] [address] [place] [address]  
[phone] [address] [facility] [address] [place] [address] Upcoming Health Maintenance Date Due Hepatitis C Screening 1950 DTaP/Tdap/Td series (1 - Tdap) 2/6/1969 FOBT Q 1 YEAR AGE 50-75 2/6/1998 ZOSTER VACCINE AGE 60> 12/6/2007 GLAUCOMA SCREENING Q2Y 2/6/2013 Pneumococcal 65+ High/Highest Risk (1 of 2 - PCV13) 2/6/2013 MEDICARE YEARLY EXAM 2/6/2013 INFLUENZA AGE 9 TO ADULT 8/1/2017 Allergies as of 9/28/2017  Review Complete On: 9/28/2017 By: Stephen Johnson MD  
 No Known Allergies Current Immunizations  Never Reviewed No immunizations on file. Not reviewed this visit You Were Diagnosed With   
  
 Codes Comments Atherosclerosis of native coronary artery of native heart without angina pectoris    -  Primary ICD-10-CM: I25.10 ICD-9-CM: 414.01 Ischemic cardiomyopathy     ICD-10-CM: I25.5 ICD-9-CM: 414.8 Mixed hyperlipidemia     ICD-10-CM: E78.2 ICD-9-CM: 272.2 Implantable cardioverter-defibrillator (ICD) in situ     ICD-10-CM: Z95.810 ICD-9-CM: V45.02 Vitals BP Pulse Resp Height(growth percentile) Weight(growth percentile) BMI  
 130/80 (BP 1 Location: Left arm, BP Patient Position: Sitting) 64 16 5' 10\" (1.778 m) 225 lb 12.8 oz (102.4 kg) 32.4 kg/m2 Smoking Status Former Smoker Vitals History BMI and BSA Data Body Mass Index Body Surface Area  
 32.4 kg/m 2 2.25 m 2 Preferred Pharmacy Pharmacy Name Phone [facility], [address] [facility]  [facility] [phone] Your Updated Medication List  
  
   
This list is accurate as of: 9/28/17  2:15 PM.  Always use your most recent med list.  
  
  
  
  
 AMBIEN 10 mg tablet Generic drug:  zolpidem Take  by mouth nightly as needed. aspirin 81 mg tablet Take 81 mg by mouth daily. atorvastatin 80 mg tablet Commonly known as:  LIPITOR TAKE 1 TABLET DAILY  
  
 chlorhexidine 4 % liquid Commonly known as:  HIBICLENS Apply to the upper chest area from shoulder/neck to mid line of chest and to below the nipple every day, 5 days prior to the procedure. clopidogrel 75 mg Tab Commonly known as:  PLAVIX Take 1 Tab by mouth daily. cyanocobalamin 1,000 mcg tablet Take 1,000 mcg by mouth daily. docusate sodium 100 mg capsule Commonly known as:  Shlomo Valentina Take 100 mg by mouth two (2) times a day. DYMISTA 137-50 mcg/spray [place] Generic drug:  azelastine-fluticasone  
by Nasal route two (2) times a day. fluticasone-salmeterol 500-50 mcg/dose diskus inhaler Commonly known as:  ADVAIR Take 1 Puff by inhalation every twelve (12) hours. loratadine 10 mg tablet Commonly known as:  Veryl Bleak Take 10 mg by mouth daily. MUCINEX 1,200 mg Ta12 ER tablet Generic drug:  guaiFENesin Take 1,200 mg by mouth daily. multivitamin tablet Commonly known as:  ONE A DAY Take 1 Tab by mouth daily. nitroglycerin 400 mcg/spray spray Commonly known as:  NITROLINGUAL  
1 [place] by SubLINGual route every five (5) minutes as needed. omega-3 fatty acids-vitamin e 1,000 mg Cap Commonly known as:  FISH OIL Take 1 Cap by mouth daily. OTHER(NON-FORMULARY) Take 1 g by mouth every evening. Indications: 2 units every evening for immune support PROBIOTIC 4X 10-15 mg Tbec Generic drug:  B.infantis-B.ani-B.long-B.bifi Take  by mouth two (2) times a day. sotalol 120 mg tablet Commonly known as:  Niranjan Leyla Take 1 Tab by mouth two (2) times a day. tamsulosin 0.4 mg capsule Commonly known as:  FLOMAX Take 0.4 mg by mouth daily. tiotropium 18 mcg inhalation capsule Commonly known as:  Barbara Sizer Take 2 Caps by inhalation daily. Follow-up Instructions Return in about 6 months (around 3/28/2018). To-Do List   
 10/02/2017 1:15 PM  
 Appointment with [facility] [place] [facility] [place] [facility] ([phone]) Introducing [facility] SERVICES! Dear Nichole Hull: 
Thank you for requesting a [employer] account. Our records indicate that you already have an active [employer] account. You can access your account anytime at [email or web address]/"[email or web address]" Did you know that you can access your hospital and ER discharge instructions at any time in [employer] You can also review all of your test results from your hospital stay or ER visit. Additional Information If you have questions, please visit the Frequently Asked Questions section of the [employer] website at [email or web address] Remember, [employer] is NOT to be used for urgent needs. For medical emergencies, dial 911. Now available from your iPhone and Android! Please provide this summary of care documentation to your next provider. Your primary care clinician is listed as Cinthya Floyd. If you have any questions after today's visit, please call [phone].

## 2017-09-28 NOTE — PROGRESS NOTES
HISTORY OF PRESENT ILLNESS  Adelso Menjivar is a 69 y.o. male     SUMMARY:   Problem List  Date Reviewed: 9/28/2017          Codes Class Noted    Lung cancer, upper lobe ([address] ICD-10-CM: C34.10  ICD-9-CM: 162.3  9/27/2015    Overview Signed 9/27/2015  5:49 PM by Bjorn Fortune MD      3 cm left upper lobe lung cancer stage IB 9/2015             PAT (paroxysmal atrial tachycardia) (HCC) ICD-10-CM: I47.1  ICD-9-CM: 427.0  Unknown        Encounter for long-term (current) use of high-risk medication ICD-10-CM: Z79.899  ICD-9-CM: V58.69  7/27/2012    Overview Signed 7/27/2012  9:07 AM by Bjorn Fortune MD     sotalol             Coronary atherosclerosis of native coronary artery ICD-10-CM: I25.10  ICD-9-CM: 414.01  4/26/2012    Overview Addendum 4/5/2017  7:00 AM by David Castanon MD     2/10 lexiscan cardiolyte, mostly fixed inferolateral defect. lvef 49%  9/16 lexiscan cardiolyte, mostly fixed inferolateral defect  lvef 42%             Cardiomyopathy ([facility] ICD-10-CM: I42.9  ICD-9-CM: 425.4  4/26/2012        Unspecified cardiovascular disease ICD-10-CM: I25.10  ICD-9-CM: 429.2  9/14/2010    Overview Signed 10/13/2010 12:52 PM by Ga Ya MD     Mr. Tammie Yuan cardiac history dates back to 1995 when he presented with unstable angina while living in [place] and underwent balloon angioplasty-vessel unknown. In June 2004, he had a cardiac arrest associated with an acute lateral MI. Cardiac catheterization at that time showed severe two vessel coronary disease and he had stents placed in both his obtuse marginal, and a week later in his right coronary artery. In January 2005, he had recurrent symptoms in association with an abnormal stress test and had a drug-eluting stent placed within the previously stented segment of his obtuse marginal branch. Last cardiac catheterization was in July 2005, and it showed him to have stable anatomy.  He had an AICD implanted in 2004 after his out of hospital arrest. Repeat cath 2/10 show instent restenosis rca, treated at that time with BARB. LVEF 2/10 40-45%. Cardiac arrest [facility]) ICD-10-CM: I46.9  ICD-9-CM: 427.5  9/14/2010        Other specified forms of chronic ischemic heart disease ICD-10-CM: I25.89  ICD-9-CM: 414.8  9/14/2010        Implantable cardioverter-defibrillator (ICD) in situ ICD-10-CM: Z95.810  ICD-9-CM: V45.02  9/14/2010        Hyperlipidemia, unspecified ICD-10-CM: E78.5  ICD-9-CM: 272.4  9/14/2010        Paroxysmal ventricular tachycardia (HCC) ICD-10-CM: I47.2  ICD-9-CM: 427.1  9/14/2010        Ventricular fibrillation (HCC) ICD-10-CM: I49.01  ICD-9-CM: 427.41  9/14/2010              Current Outpatient Prescriptions on File Prior to Visit   Medication Sig    sotalol (BETAPACE) 120 mg tablet Take 1 Tab by mouth two (2) times a day.  clopidogrel (PLAVIX) 75 mg tab Take 1 Tab by mouth daily.  atorvastatin (LIPITOR) 80 mg tablet TAKE 1 TABLET DAILY    nitroglycerin (NITROLINGUAL) 400 mcg/spray spray 1 Spray by SubLINGual route every five (5) minutes as needed.  azelastine-fluticasone (DYMISTA) 137-50 mcg/spray spry by Nasal route two (2) times a day.  fluticasone-salmeterol (ADVAIR) 500-50 mcg/dose diskus inhaler Take 1 Puff by inhalation every twelve (12) hours.  docusate sodium (COLACE) 100 mg capsule Take 100 mg by mouth two (2) times a day.  tiotropium (SPIRIVA) 18 mcg inhalation capsule Take 2 Caps by inhalation daily.  tamsulosin (FLOMAX) 0.4 mg capsule Take 0.4 mg by mouth daily.  OTHER,NON-FORMULARY, Take 1 g by mouth every evening. Indications: 2 units every evening for immune support    multivitamin (ONE A DAY) tablet Take 1 Tab by mouth daily.  B.infantis-B.ani-B.long-B.bifi (PROBIOTIC 4X) 10-15 mg TbEC Take  by mouth two (2) times a day.  omega-3 fatty acids-vitamin e (FISH OIL) 1,000 mg cap Take 1 Cap by mouth daily.  aspirin 81 mg tablet Take 81 mg by mouth daily.     zolpidem (AMBIEN) 10 mg tablet Take  by mouth nightly as needed.  chlorhexidine (HIBICLENS) 4 % liquid Apply to the upper chest area from shoulder/neck to mid line of chest and to below the nipple every day, 5 days prior to the procedure.  cyanocobalamin 1,000 mcg tablet Take 1,000 mcg by mouth daily. No current facility-administered medications on file prior to visit. CARDIOLOGY STUDIES TO DATE:  2/10 lexiscan cardiolyte, mostly fixed inferolateral defect. lvef 49%  9/16 lexiscan cardiolyte, mostly fixed inferolateral defect  lvef 42%        Chief Complaint   Patient presents with    Coronary Artery Disease     HPI :  Since we last met, Mr. Jason Robert was diagnosed with recurrent lung cancer and unfortunately because of his anatomy, he is not a candidate for resection nor for intense radiation; however, he has been able to complete a 33 week course of radiation and is a candidate for immunotherapy going forward. He has put on some weight and he still gets short of breath with activity. He has to have his defibrillator generator replaced next week. His lipid profile from the other day looked outstanding with an HDL of 44, LDL of 80.       CARDIAC ROS:   negative for chest pain, palpitations, syncope, orthopnea, paroxysmal nocturnal dyspnea, exertional chest pressure/discomfort, claudication, lower extremity edema    Family History   Problem Relation Age of Onset    Heart Disease Neg Hx        Past Medical History:   Diagnosis Date    Automatic implantable cardiac defibrillator in situ 9/14/2010    Cardiac arrest ([address] 9/14/2010    ICD (implantable cardiac defibrillator) in place     Other and unspecified hyperlipidemia 9/14/2010    Other specified forms of chronic ischemic heart disease 9/14/2010    Paroxysmal ventricular tachycardia ([address] 9/14/2010    PAT (paroxysmal atrial tachycardia) ([address]     Unspecified cardiovascular disease 9/14/2010    Ventricular fibrillation ([address] 9/14/2010       GENERAL ROS:  A comprehensive review of systems was negative except for that written in the HPI. Visit Vitals    /80 (BP 1 Location: Left arm, BP Patient Position: Sitting)    Pulse 64    Resp 16    Ht 5' 10\" (1.778 m)    Wt 225 lb 12.8 oz (102.4 kg)    BMI 32.4 kg/m2       Wt Readings from Last 3 Encounters:   09/28/17 225 lb 12.8 oz (102.4 kg)   04/05/17 215 lb 12.8 oz (97.9 kg)   02/15/17 208 lb (94.3 kg)            BP Readings from Last 3 Encounters:   09/28/17 130/80   04/05/17 114/64   02/15/17 140/82       PHYSICAL EXAM  General appearance: alert, cooperative, no distress, appears stated age  Neck: supple, symmetrical, trachea midline, no adenopathy, no carotid bruit and no JVD  Lungs: clear to auscultation bilaterally  Heart: regular rate and rhythm, S1, S2 normal, no murmur, click, rub or gallop  Extremities: extremities normal, atraumatic, no cyanosis or edema    Lab Results   Component Value Date/Time    Cholesterol, total 144 09/27/2017 07:16 AM    Cholesterol, total 133 04/11/2017 07:29 AM    Cholesterol, total 144 10/27/2016 08:20 AM    Cholesterol, total 153 04/25/2016 07:52 AM    Cholesterol, total 199 01/15/2016 08:24 AM    HDL Cholesterol 43 09/27/2017 07:16 AM    HDL Cholesterol 39 04/11/2017 07:29 AM    HDL Cholesterol 45 10/27/2016 08:20 AM    HDL Cholesterol 50 04/25/2016 07:52 AM    HDL Cholesterol 53 01/15/2016 08:24 AM    LDL, calculated 80 09/27/2017 07:16 AM    LDL, calculated 73 04/11/2017 07:29 AM    LDL, calculated 81 10/27/2016 08:20 AM    LDL, calculated 83 04/25/2016 07:52 AM    LDL, calculated 118 01/15/2016 08:24 AM    Triglyceride 104 09/27/2017 07:16 AM    Triglyceride 107 04/11/2017 07:29 AM    Triglyceride 92 10/27/2016 08:20 AM    Triglyceride 102 04/25/2016 07:52 AM    Triglyceride 141 01/15/2016 08:24 AM     ASSESSMENT  Mr. Felicia Hwang is stable, asymptomatic and well compensated from a cardiac standpoint on a good medical regimen and needs no cardiac testing at this time.       current treatment plan is effective, no change in therapy  lab results and schedule of future lab studies reviewed with patient  reviewed diet, exercise and weight control    Encounter Diagnoses   Name Primary?  Atherosclerosis of native coronary artery of native heart without angina pectoris Yes    Ischemic cardiomyopathy     Mixed hyperlipidemia     Implantable cardioverter-defibrillator (ICD) in situ      Orders Placed This Encounter    guaiFENesin (MUCINEX) 1,200 mg Ta12 ER tablet    loratadine (CLARITIN) 10 mg tablet       Follow-up Disposition:  Return in about 6 months (around 3/28/2018).     Rangel Storey MD  9/28/2017

## 2017-09-29 RX ORDER — SODIUM CHLORIDE 0.9 % (FLUSH) 0.9 %
5-10 SYRINGE (ML) INJECTION EVERY 8 HOURS
Status: CANCELLED | OUTPATIENT
Start: 2017-09-29

## 2017-09-29 RX ORDER — SODIUM CHLORIDE 0.9 % (FLUSH) 0.9 %
5-10 SYRINGE (ML) INJECTION AS NEEDED
Status: CANCELLED | OUTPATIENT
Start: 2017-09-29

## 2017-09-29 RX ORDER — CEFAZOLIN SODIUM IN 0.9 % NACL 2 G/50 ML
2 INTRAVENOUS SOLUTION, PIGGYBACK (ML) INTRAVENOUS ONCE
Status: CANCELLED | OUTPATIENT
Start: 2017-09-29 | End: 2017-09-29

## 2017-10-02 ENCOUNTER — HOSPITAL ENCOUNTER (OUTPATIENT)
Dept: CARDIAC CATH/INVASIVE PROCEDURES | Age: 67
Discharge: HOME | End: 2017-10-02
Attending: INTERNAL MEDICINE | Admitting: INTERNAL MEDICINE
Payer: MEDICARE

## 2017-10-02 VITALS
OXYGEN SATURATION: 96 % | WEIGHT: 217 LBS | RESPIRATION RATE: 15 BRPM | BODY MASS INDEX: 31.07 KG/M2 | SYSTOLIC BLOOD PRESSURE: 126 MMHG | DIASTOLIC BLOOD PRESSURE: 68 MMHG | HEART RATE: 74 BPM | HEIGHT: 70 IN | TEMPERATURE: 97.9 F

## 2017-10-02 PROBLEM — T82.198D: Status: ACTIVE | Noted: 2017-10-02

## 2017-10-02 PROCEDURE — 77030018547 HC SUT ETHBND1 J&J -B

## 2017-10-02 PROCEDURE — 74011000250 HC RX REV CODE- 250: Performed by: INTERNAL MEDICINE

## 2017-10-02 PROCEDURE — C1894 INTRO/SHEATH, NON-LASER: HCPCS

## 2017-10-02 PROCEDURE — 77030032060 HC PWDR HEMSTAT ARISTA ASRB 3GM BARD -C

## 2017-10-02 PROCEDURE — 77030028698 HC BLD TISS PLSM MEDT -D

## 2017-10-02 PROCEDURE — 77030011640 HC PAD GRND REM COVD -A

## 2017-10-02 PROCEDURE — C1721 AICD, DUAL CHAMBER: HCPCS

## 2017-10-02 PROCEDURE — 77030031139 HC SUT VCRL2 J&J -A

## 2017-10-02 PROCEDURE — 33263 RMVL & RPLCMT DFB GEN 2 LEAD: CPT

## 2017-10-02 PROCEDURE — C1733 CATH, EP, OTHR THAN COOL-TIP: HCPCS

## 2017-10-02 PROCEDURE — 74011250636 HC RX REV CODE- 250/636: Performed by: INTERNAL MEDICINE

## 2017-10-02 PROCEDURE — 77030022017 HC DRSG HEMO QCLOT ZMED -A

## 2017-10-02 PROCEDURE — 77030018729 HC ELECTRD DEFIB PAD CARD -B

## 2017-10-02 PROCEDURE — 77030010872 HC CBL EP BSC -C

## 2017-10-02 RX ORDER — SODIUM CHLORIDE 0.9 % (FLUSH) 0.9 %
5-10 SYRINGE (ML) INJECTION EVERY 8 HOURS
Status: DISCONTINUED | OUTPATIENT
Start: 2017-10-02 | End: 2017-10-02 | Stop reason: HOSPADM

## 2017-10-02 RX ORDER — SODIUM CHLORIDE 9 MG/ML
25 INJECTION, SOLUTION INTRAVENOUS CONTINUOUS
Status: DISCONTINUED | OUTPATIENT
Start: 2017-10-02 | End: 2017-10-02 | Stop reason: HOSPADM

## 2017-10-02 RX ORDER — SODIUM CHLORIDE 9 MG/ML
500 INJECTION, SOLUTION INTRAVENOUS ONCE
Status: COMPLETED | OUTPATIENT
Start: 2017-10-02 | End: 2017-10-02

## 2017-10-02 RX ORDER — SODIUM CHLORIDE 0.9 % (FLUSH) 0.9 %
5 SYRINGE (ML) INJECTION AS NEEDED
Status: DISCONTINUED | OUTPATIENT
Start: 2017-10-02 | End: 2017-10-02 | Stop reason: HOSPADM

## 2017-10-02 RX ORDER — ATROPINE SULFATE 0.1 MG/ML
0.5 INJECTION INTRAVENOUS AS NEEDED
Status: DISCONTINUED | OUTPATIENT
Start: 2017-10-02 | End: 2017-10-02 | Stop reason: HOSPADM

## 2017-10-02 RX ORDER — CEPHALEXIN 500 MG/1
500 CAPSULE ORAL 3 TIMES DAILY
Qty: 15 CAP | Refills: 0 | Status: SHIPPED | OUTPATIENT
Start: 2017-10-02 | End: 2017-10-19 | Stop reason: ALTCHOICE

## 2017-10-02 RX ORDER — SODIUM CHLORIDE 0.9 % (FLUSH) 0.9 %
5-10 SYRINGE (ML) INJECTION AS NEEDED
Status: DISCONTINUED | OUTPATIENT
Start: 2017-10-02 | End: 2017-10-02 | Stop reason: HOSPADM

## 2017-10-02 RX ORDER — FENTANYL CITRATE 50 UG/ML
25-200 INJECTION, SOLUTION INTRAMUSCULAR; INTRAVENOUS
Status: DISCONTINUED | OUTPATIENT
Start: 2017-10-02 | End: 2017-10-02

## 2017-10-02 RX ORDER — CEFAZOLIN SODIUM IN 0.9 % NACL 2 G/50 ML
2 INTRAVENOUS SOLUTION, PIGGYBACK (ML) INTRAVENOUS ONCE
Status: COMPLETED | OUTPATIENT
Start: 2017-10-02 | End: 2017-10-02

## 2017-10-02 RX ORDER — HYDROCODONE BITARTRATE AND ACETAMINOPHEN 5; 325 MG/1; MG/1
1 TABLET ORAL
Qty: 15 TAB | Refills: 0 | Status: SHIPPED | OUTPATIENT
Start: 2017-10-02 | End: 2017-10-19 | Stop reason: ALTCHOICE

## 2017-10-02 RX ORDER — MIDAZOLAM HYDROCHLORIDE 1 MG/ML
.5-1 INJECTION, SOLUTION INTRAMUSCULAR; INTRAVENOUS
Status: DISCONTINUED | OUTPATIENT
Start: 2017-10-02 | End: 2017-10-02

## 2017-10-02 RX ORDER — LIDOCAINE HYDROCHLORIDE 10 MG/ML
10-30 INJECTION INFILTRATION; PERINEURAL ONCE
Status: COMPLETED | OUTPATIENT
Start: 2017-10-02 | End: 2017-10-02

## 2017-10-02 RX ORDER — VANCOMYCIN HYDROCHLORIDE 1 G/20ML
1 INJECTION, POWDER, LYOPHILIZED, FOR SOLUTION INTRAVENOUS ONCE
Status: COMPLETED | OUTPATIENT
Start: 2017-10-02 | End: 2017-10-02

## 2017-10-02 RX ADMIN — FENTANYL CITRATE 50 MCG: 50 INJECTION, SOLUTION INTRAMUSCULAR; INTRAVENOUS at 14:08

## 2017-10-02 RX ADMIN — SODIUM CHLORIDE 500 ML: 900 INJECTION, SOLUTION INTRAVENOUS at 14:12

## 2017-10-02 RX ADMIN — FENTANYL CITRATE 50 MCG: 50 INJECTION, SOLUTION INTRAMUSCULAR; INTRAVENOUS at 14:49

## 2017-10-02 RX ADMIN — CEFAZOLIN 2 G: 1 INJECTION, POWDER, FOR SOLUTION INTRAMUSCULAR; INTRAVENOUS; PARENTERAL at 14:10

## 2017-10-02 RX ADMIN — FENTANYL CITRATE 50 MCG: 50 INJECTION, SOLUTION INTRAMUSCULAR; INTRAVENOUS at 15:12

## 2017-10-02 RX ADMIN — FENTANYL CITRATE 50 MCG: 50 INJECTION, SOLUTION INTRAMUSCULAR; INTRAVENOUS at 14:28

## 2017-10-02 RX ADMIN — MIDAZOLAM HYDROCHLORIDE 2 MG: 1 INJECTION, SOLUTION INTRAMUSCULAR; INTRAVENOUS at 14:49

## 2017-10-02 RX ADMIN — MIDAZOLAM HYDROCHLORIDE 2 MG: 1 INJECTION, SOLUTION INTRAMUSCULAR; INTRAVENOUS at 14:28

## 2017-10-02 RX ADMIN — LIDOCAINE HYDROCHLORIDE 30 ML: 10 INJECTION, SOLUTION INFILTRATION; PERINEURAL at 14:20

## 2017-10-02 RX ADMIN — MIDAZOLAM HYDROCHLORIDE 2 MG: 1 INJECTION, SOLUTION INTRAMUSCULAR; INTRAVENOUS at 15:12

## 2017-10-02 RX ADMIN — Medication 10 ML: at 14:15

## 2017-10-02 RX ADMIN — SODIUM CHLORIDE 25 ML/HR: 900 INJECTION, SOLUTION INTRAVENOUS at 11:45

## 2017-10-02 RX ADMIN — MIDAZOLAM HYDROCHLORIDE 1 MG: 1 INJECTION, SOLUTION INTRAMUSCULAR; INTRAVENOUS at 14:33

## 2017-10-02 RX ADMIN — MIDAZOLAM HYDROCHLORIDE 2 MG: 1 INJECTION, SOLUTION INTRAMUSCULAR; INTRAVENOUS at 14:08

## 2017-10-02 RX ADMIN — MIDAZOLAM HYDROCHLORIDE 1 MG: 1 INJECTION, SOLUTION INTRAMUSCULAR; INTRAVENOUS at 15:16

## 2017-10-02 RX ADMIN — VANCOMYCIN HYDROCHLORIDE 1000 MG: 1 INJECTION, POWDER, LYOPHILIZED, FOR SOLUTION INTRAVENOUS at 12:00

## 2017-10-02 NOTE — ROUTINE PROCESS
Cardiac Cath Lab Recovery Arrival Note:      Ekta Garcia arrived to Cardiac Cath Lab, Recovery Area. Staff introduced to patient. Patient identifiers verified with NAME and DATE OF BIRTH. Procedure verified with patient. Consent forms reviewed and signed by patient or authorized representative and verified. Allergies verified. Patient and family oriented to department. Patient and family informed of procedure and plan of care. Questions answered with review. Patient prepped for procedure, per orders from physician, prior to arrival.    Patient on cardiac monitor, non-invasive blood pressure, SPO2 monitor. On room air. Patient is A&Ox 3. Patient reports no CP. Patient in stretcher, in low position, with side rails up, call bell within reach, patient instructed to call if assistance as needed.     Patient prep in: [facility] Recovery Area, [facility]    PREP BY DON

## 2017-10-02 NOTE — IP AVS SNAPSHOT
[address] 
[phone] Patient: Ivania Allison MRN: [id number] You are allergic to the following No active allergies Recent Documentation Height Weight BMI Smoking Status 1.778 m 98.4 kg 31.14 kg/m2 Former Smoker Emergency Contacts Name Discharge Info Relation Home Work Mobile [address] [facility] CAREGIVER [3] Spouse [3] [phone] About your hospitalization You were admitted on:  October 2, 2017 You last received care in the:  [address] CATH LAB You were discharged on:  October 2, 2017 Unit phone number:  [phone] Why you were hospitalized Your primary diagnosis was:  Not on File Your diagnoses also included:  Implantable Cardioverter-Defibrillator (Icd) In Situ Providers Seen During Your Hospitalizations Provider Role Specialty Primary office phone Preeti Arceo MD Attending Provider Cardiology [phone] Your Primary Care Physician (PCP) Primary Care Physician Office Phone Office Fax Bohdan Blizzard [phone] Follow-up Information Follow up With Details Comments Contact Info Preeti Arceo MD On 10/19/2017 3 pm [address] [place] 
[phone] Mikey Ruggiero MD   1 [facility] [place] 
[phone] Your Appointments Thursday October 19, 2017  3:00 PM EDT  
PACEMAKER with [id number] ([facility] SCHEDULING) [address] [place]  
[phone] Thursday October 19, 2017  3:00 PM EDT  
ESTABLISHED PATIENT with Preeti Arceo MD  
[facility] ([facility]) [address] [place]  
[phone] Current Discharge Medication List  
  
START taking these medications Dose & Instructions Dispensing Information Comments Morning Noon Evening Bedtime  
 cephALEXin 500 mg capsule Commonly known as:  Regichris Gaaml Your last dose was: Your next dose is:    
   
   
 Dose:  500 mg Take 1 Cap by mouth three (3) times daily. Quantity:  15 Cap Refills:  0 HYDROcodone-acetaminophen 5-325 mg per tablet Commonly known as:  Sergo Rasp Your last dose was: Your next dose is:    
   
   
 Dose:  1 Tab Take 1 Tab by mouth every six (6) hours as needed for Pain. Max Daily Amount: 4 Tabs. Quantity:  15 Tab Refills:  0 CONTINUE these medications which have NOT CHANGED Dose & Instructions Dispensing Information Comments Morning Noon Evening Bedtime AMBIEN 10 mg tablet Generic drug:  zolpidem Your last dose was: Your next dose is: Take  by mouth nightly as needed. Refills:  0  
     
   
   
   
  
 aspirin 81 mg tablet Your last dose was: Your next dose is:    
   
   
 Dose:  81 mg Take 81 mg by mouth daily. Refills:  0  
     
   
   
   
  
 atorvastatin 80 mg tablet Commonly known as:  LIPITOR Your last dose was: Your next dose is: TAKE 1 TABLET DAILY Quantity:  90 Tab Refills:  3  
     
   
   
   
  
 clopidogrel 75 mg Tab Commonly known as:  PLAVIX Your last dose was: Your next dose is:    
   
   
 Dose:  75 mg Take 1 Tab by mouth daily. Quantity:  90 Tab Refills:  3  
     
   
   
   
  
 docusate sodium 100 mg capsule Commonly known as:  Delia Valeroas Your last dose was: Your next dose is:    
   
   
 Dose:  100 mg Take 100 mg by mouth two (2) times a day. Refills:  0 DYMISTA 137-50 mcg/spray [place] Generic drug:  azelastine-fluticasone Your last dose was: Your next dose is:    
   
   
 by Nasal route two (2) times a day. Refills:  0  
     
   
   
   
  
 fluticasone-salmeterol 500-50 mcg/dose diskus inhaler Commonly known as:  ADVAIR Your last dose was: Your next dose is:    
   
   
 Dose:  1 Puff Take 1 Puff by inhalation every twelve (12) hours. Refills:  0  
     
   
   
   
  
 loratadine 10 mg tablet Commonly known as:  Napolean Niurka Your last dose was: Your next dose is:    
   
   
 Dose:  10 mg Take 10 mg by mouth daily. Refills:  0 MUCINEX 1,200 mg Ta12 ER tablet Generic drug:  guaiFENesin Your last dose was: Your next dose is:    
   
   
 Dose:  1200 mg Take 1,200 mg by mouth daily. Refills:  0  
     
   
   
   
  
 multivitamin tablet Commonly known as:  ONE A DAY Your last dose was: Your next dose is:    
   
   
 Dose:  1 Tab Take 1 Tab by mouth daily. Refills:  0  
     
   
   
   
  
 nitroglycerin 400 mcg/spray spray Commonly known as:  Sherrine Catena Your last dose was: Your next dose is:    
   
   
 Dose:  1 Spray 1 Spray by SubLINGual route every five (5) minutes as needed. Quantity:  1 Bottle Refills:  3  
     
   
   
   
  
 omega-3 fatty acids-vitamin e 1,000 mg Cap Commonly known as:  FISH OIL Your last dose was: Your next dose is:    
   
   
 Dose:  1 Cap Take 1 Cap by mouth daily. Quantity:  90 Cap Refills:  3 OTHER(NON-FORMULARY) Your last dose was: Your next dose is:    
   
   
 Dose:  1 g Take 1 g by mouth every evening. Indications: 2 units every evening for immune support Refills:  0 PROBIOTIC 4X 10-15 mg Tbec Generic drug:  B.infantis-B.ani-B.long-B.bifi Your last dose was: Your next dose is: Take  by mouth two (2) times a day. Refills:  0 sotalol 120 mg tablet Commonly known as:  Massiel Sniff Your last dose was: Your next dose is:    
   
   
 Dose:  120 mg Take 1 Tab by mouth two (2) times a day. Quantity:  180 Tab Refills:  1  
     
   
   
   
  
 tamsulosin 0.4 mg capsule Commonly known as:  FLOMAX Your last dose was: Your next dose is:    
   
   
 Dose:  0.4 mg Take 0.4 mg by mouth daily. Refills:  0  
     
   
   
   
  
 tiotropium 18 mcg inhalation capsule Commonly known as:  Candida Rust Your last dose was: Your next dose is:    
   
   
 Dose:  2 Cap Take 2 Caps by inhalation daily. Refills:  0 STOP taking these medications   
 chlorhexidine 4 % liquid Commonly known as:  HIBICLENS Where to Get Your Medications [facility] [address], [address] [facility],  [address] [place] [address] Phone:  [phone]  
  cephALEXin 500 mg capsule Information on where to get these meds will be given to you by the nurse or doctor. ! Ask your nurse or doctor about these medications HYDROcodone-acetaminophen 5-325 mg per tablet Discharge Instructions Hold Aspirin and Plavix for 3 days and resume both medications on Thursday 10/5/17 Patient Instructions Post-ICD Generator Change 1. Remove aquacel dressing in a week. Your incision will have skin glue covering the incision, the skin glue will help to reinforce the incision to prevent it from opening, please DO NOT attempt to peel the glue off of the incision. You do have sutures on the inside of the incision that are not visible. Please DO NOT try to scrub the skin glue off once you are able to take a shower. The skin glue will eventually fall off 2. Call Dr. Adelita Mueller at [phone] if you experience any of the following symptoms: Redness at the ICD site Swelling at or around the ICD or in the left arm Pain around the ICD Dizziness, lightheadedness, fainting spells Lack of energy Shortness of breath Rapid heart rate Chest or muscle twitches 3. Call Dr. Sean Escalante at [phone] if your ICD delivers a shock. The physician may or may not want to see you in the office (that decision will be made when you call) 4. Follow-up with Dr. Sean Escalante as scheduled Future Appointments Date Time Provider Cyrus Carmen 10/19/2017 3:00 PM Paul Velasco  [address]  
10/19/2017 3:00 PM PACEMAKER3, [address]  
12/5/2017 10:15 AM REMOTE1, KARUNA SRINIVASAN SCHED  
3/8/2018 10:15 AM PACEMAKER3, KARUNA SRINIVASAN SCHED  
3/8/2018 10:20 AM Paul Velasco  [address]  
3/30/2018 10:00 AM Alonzo Stern  [address]  
 
 
5. You may use pain medication and ice pack for pain relief Coco Wilks M.D. [facility] Electrophysiology/Cardiology [facility] [place] [address], [place], [facility] [address] 
[phone]                                        [phone] Discharge Orders None Introducing [facility]! Dear Chito Ashby: 
Thank you for requesting a [employer] account. Our records indicate that you already have an active [employer] account. You can access your account anytime at [email or web address] Did you know that you can access your hospital and ER discharge instructions at any time in [employer]? You can also review all of your test results from your hospital stay or ER visit. Additional Information If you have questions, please visit the Frequently Asked Questions section of the [employer] website at [email or web address]/. Remember, [employer] is NOT to be used for urgent needs.  For medical emergencies, dial 911. Now available from your iPhone and Android! General Information Please provide this summary of care documentation to your next provider. Patient Signature:  ____________________________________________________________ Date:  ____________________________________________________________  
  
Archana Feller Provider Signature:  ____________________________________________________________ Date:  ____________________________________________________________

## 2017-10-02 NOTE — PROCEDURES
PERMANENT AICD GENERATOR CHANGE    DATE OF PROCEDURE: 10/2/2017  PROCEDURE:  Replacement of dual chamber ICD and non invasive programmed stimulation (defibrillation threshold testing) of the defibrillator  Fluoroscopy of the lead was performed    HISTORY: This is a 69 y.o. man with old MI, ischemic cardiomyopathy and history of sudden cardiac death. He has chronic systolic CHF with LVEF 76% on recent stress test.  The dual chamber ICD has reached [facility] so he wants it be replaced for secondary prevention of sudden death. The risks and benefits were discussed with the patient and the patient agreed to proceed. He has a RIATA ICD lead but there has not been sign of lead malfunction so he does not want extraction  PROCEDURE IN DETAILS:   After informed consent was obtained, the patient was brought to the electrophysiology suite and Fluoroscopy of the lead was performed and did not show lead insulation break down. He was prepped and draped in the usual sterile fashion. Conscious sedation was initiated and maintained with intravenous Versed and Fentanyl. Local anesthesia with 2% Xylocaine was given in the left infraclavicular area below the old scar. The #10 blade scalpel was then used to make a 4 cm incision. The incision was taken down the capsule tissue layer using peak plasma blade and blunt dissection. The leads and device were taken out of the pocket and leads were detached from the old generator and connected to the new AICD generator for analysis. The pocket is now irrigated with antibiotic solution and the leads connected to the device inserted inside the pocket. Panda powder was placed inside the pocket to help with clotting because extensive capsule scar tissue was debrided. This is his second generator change. Vancomycin powder was placed inside the pocket. The pocket is now closed in three consecutive layers using 2-0 Vicryl sutures in continuous fashion.   Dermabond adhesive tape was then applied over the surgical wound. COMPLICATIONS:  None. Estimated blood loss is: 10 mL  Noninvasive programmed stimulation test of the defibrillator: VF was induced by DC fibber and device delivered 25 J shock with charge time 4.4 seconds and shocking impedance 48 ohms to NSR. Patient woke up from sedation     IMPLANTED HARDWARE:    The new AICD is a 2000 [employer] , serial # [id number], model # 2357-40C  The atrial Lead:  model # [id number], serial # [id number]   The ventricular lead: model # RGIAM 5414-58, serial # [id number]     Specimen removed: [employer] ICD Current , 2211-36, SN [id number], DOI 3/10/2010    DATA:   The P-wave is sensed at 5 mV, pacing impedance is 410 ohms. Pacing threshold is 0.75 V at 0.5 ms. Ventricular lead sensing is 11.4 mV, pacing impedance is 340 ohms, pacing threshold is 1.0 V at 0.8 ms. PROGRAMMED PARAMETERS:    The device was programmed to VIPR with low rate of 60 and upper tracking, sensor rate of 130 beats per minute. Ventricular tachycardia is detected over 187 beats per minute and therapy consisted of 3 ATP and followed shocks are maximized out at 40 after a 36 joules. The ventricular fibrillation is detected at 222 beats per minute and therapy consisted of all shocks maximized at 40 after a 36 joules.   ASSESSMENT AND PLAN:    The patient will follow up with me in 2 weeks

## 2017-10-02 NOTE — H&P
Cardiac Electrophysiology H&P Note     Subjective: Nuvia Avila is a 69 y.o. male patient who is here today for ICD generator change. Device triggered WILLIAM 9/2017. Cardiologists Dr Bernie Miller. No chest pain, shortness of breath, or limitation in functional capacity. His NYHA class is I. No recent hospitalizations. He recently finished his 35 radiation treatments for his left lung disease and will be starting immunotherapy treatment soon. He takes sotalol. He is walking several times a week now. In the past  Stress test 10/2016 read by Dr Mack Acuna, mostly scars and LVEF 42%  He used steroid for COPD and got better     Stress test 2/2010: mild rev ischemia in inferolateral wall mixed with mainly fixed defect, LVEF 49%  A self terminating episode of VT (in VF zone) in June, 2012 seen on AICD  He has been on sotalol and the last ICD check in January did not show ventricular tachycardia but rather he had several episodes of nonsustained atrial tachycardia and the longest episode was about 10 seconds. He was completely unaware of the atrial tachycardia episode.       ICD [facility] lead was examined under fluoroscopy. There was no evidence of lead breakdown. There is no evidence of electrical noises so far on the lead.   This ICD lead is on recall.        Record from Dr Angel Kim: 3 cm left upper lobe lung cancer stage IB  ICD Riata lead with  KRISTEN 3/3/2010      Patient Active Problem List    Diagnosis Date Noted    Lung cancer, upper lobe ([facility] 09/27/2015    PAT (paroxysmal atrial tachycardia) ([facility]     Encounter for long-term (current) use of high-risk medication 07/27/2012    Coronary atherosclerosis of native coronary artery 04/26/2012    Cardiomyopathy ([facility] [address] [facility] 04/26/2012    Unspecified cardiovascular disease 09/14/2010    Cardiac arrest ([facility] [address] [facility] 09/14/2010    Other specified forms of chronic ischemic heart disease 09/14/2010    Implantable cardioverter-defibrillator (ICD) in situ 09/14/2010  Hyperlipidemia, unspecified 09/14/2010    Paroxysmal ventricular tachycardia ([facility] [address] 09/14/2010    Ventricular fibrillation (HCC) 09/14/2010     Current Facility-Administered Medications   Medication Dose Route Frequency Provider Last Rate Last Dose    ceFAZolin in 0.9% NS (ANCEF) IVPB soln 2 g  2 g IntraVENous ONCE Tracy Galdamez MD        sodium chloride (NS) flush 5-10 mL  5-10 mL IntraVENous Q8H Tracy Galdamez MD        sodium chloride (NS) flush 5-10 mL  5-10 mL IntraVENous PRN Tracy Galdamez MD        bacitracin 50,000 Units in 0.9% sodium chloride 500 mL Irrigation   Irrigation ONCE Tracy Galdamez MD        OTHER(NON-FORMULARY) 1 g  1 g Other ONCE Tracy Galdamez MD        0.9% sodium chloride infusion  25 mL/hr IntraVENous CONTINUOUS Tracy Galdamez MD         No Known Allergies  Past Medical History:   Diagnosis Date    Automatic implantable cardiac defibrillator in situ 9/14/2010    Cardiac arrest ([facility] [address] 9/14/2010    ICD (implantable cardiac defibrillator) in place     Other and unspecified hyperlipidemia 9/14/2010    Other specified forms of chronic ischemic heart disease 9/14/2010    Paroxysmal ventricular tachycardia (HCC) 9/14/2010    PAT (paroxysmal atrial tachycardia) (HCC)     Unspecified cardiovascular disease 9/14/2010    Ventricular fibrillation ([facility] [address] 9/14/2010     Past Surgical History:   Procedure Laterality Date    HX CHOLECYSTECTOMY      6/07    HX HEART CATHETERIZATION      NASAL SURG PROC UNLISTED      polyps and deviated septum    XR FLUOROSCOPY UNDER 60 MINUTES  10/3/2012          Family History   Problem Relation Age of Onset    Heart Disease Neg Hx      Social History   Substance Use Topics    Smoking status: Former Smoker     Quit date: 7/12/2004    Smokeless tobacco: Never Used    Alcohol use 0.0 oz/week     0 Standard drinks or equivalent per week      Comment: occasional        Review of Systems:   Constitutional: Negative for fever, chills, weight loss, malaise/fatigue. HEENT: Negative for nosebleeds, vision changes. Respiratory: Negative for cough, hemoptysis, sputum production, and wheezing. Cardiovascular: Negative for chest pain, palpitations, orthopnea, claudication, leg swelling, syncope, and PND. Gastrointestinal: Negative for nausea, vomiting, diarrhea, constipation, blood in stool and melena. Genitourinary: Negative for dysuria, and hematuria. Musculoskeletal: Negative for myalgias, arthralgia. Skin: Negative for rash. Heme: Does not bleed or bruise easily. Neurological: Negative for speech change and focal weakness     Objective:     Visit Vitals    /78 (BP 1 Location: Right arm, BP Patient Position: At rest)    Temp 97.9 °F (36.6 °C)    Resp 17    Ht 5' 10\" (1.778 m)    Wt 217 lb (98.4 kg)    SpO2 97%    BMI 31.14 kg/m2      Physical Exam:   Constitutional: well-developed and well-nourished. No distress. Head: Normocephalic and atraumatic. Eyes: Pupils are equal, round  Neck: supple. No JVD present. Cardiovascular: Normal rate, regular rhythm. Exam reveals no gallop and no friction rub. No murmur heard. Pulmonary/Chest: Effort normal and breath sounds normal. No wheezes. Abdominal: Soft, no tenderness. Musculoskeletal: no edema. Neurological: alert,oriented. Skin: Skin is warm and dry. Left sided ICD scar is unremarkable   Psychiatric: normal mood and affect. Behavior is normal. Judgment and thought content normal.      EK/15/17 atrial pacing, v sensed      Assessment/Plan:   ICD WILLIAM  CAD  Ischemic cardiomyopathy  Hx of cardiac arrest  PAT  VT   Left upper lobe lung cancer    Pre procedure labs reviewed. Reviewed generator change procedure with the patient and post procedure wound care, he agrees to proceed. Thank you for involving me in this patient's care and please call with further concerns or questions.       Arlene Ramirez NP  [phone]  [facility]

## 2017-10-02 NOTE — DISCHARGE INSTRUCTIONS
Hold Aspirin and Plavix for 3 days and resume both medications on Thursday 10/5/17       Patient Instructions Post-ICD Generator Change    1. Remove aquacel dressing in a week. Your incision will have skin glue covering the incision, the skin glue will help to reinforce the incision to prevent it from opening, please DO NOT attempt to peel the glue off of the incision. You do have sutures on the inside of the incision that are not visible. Please DO NOT try to scrub the skin glue off once you are able to take a shower. The skin glue will eventually fall off     2. Call Dr. Antonieta Campuzano at [phone] if you experience any of the following symptoms:  Redness at the ICD site  Swelling at or around the ICD or in the left arm  Pain around the ICD  Dizziness, lightheadedness, fainting spells  Lack of energy  Shortness of breath  Rapid heart rate  Chest or muscle twitches    3. Call Dr. Antonieta Campuzano at [phone] if your ICD delivers a shock. The physician may or may not want to see you in the office (that decision will be made when you call)    4. Follow-up with Dr. Antonieta Campuzano as scheduled   Future Appointments  Date Time Provider Cyrus Morales   10/19/2017 3:00 PM Bronwyn Christian  [address]   10/19/2017 3:00 PM Gloria Wells, [address] [address] [facility]   12/5/2017 10:15 AM [id number] SCHED   3/8/2018 10:15 AM [email or web address], Beronica SRINIVASAN SCHED   3/8/2018 10:20 AM Bronwyn Christian  [address]   3/30/2018 10:00 AM Tracy Seay  [address]       5. You may use pain medication and ice pack for pain relief      Siddharth Ram M.D.  [facility]  Electrophysiology/Cardiology  [facility] [place]  [address], [address], [address]  [place], [address] [place] [address] Avenue                             [address] [place] [address]  [phone]

## 2017-10-02 NOTE — PROGRESS NOTES
TRANSFER - IN REPORT:    Verbal report received from [facility] on Peter Brady  being received from procedure room for routine progression of care. Report consisted of patients Situation, Background, Assessment and Recommendations(SBAR). Information from the following report(s) SBAR was reviewed with the receiving clinician. Opportunity for questions and clarification was provided. Assessment completed upon patients arrival to [address] [place] [address] and care assumed. Cardiac Cath Lab Recovery Arrival Note:    Peter Brady arrived to [facility] recovery area. Patient procedure= Gen Change. Patient on cardiac monitor, non-invasive blood pressure, SPO2 monitor. On room air. IV  of NS on pump at [facility] ml/hr. Patient status doing well without problems. Patient is A&Ox 3. Patient reports no CP. PROCEDURE SITE CHECK:    Procedure site:without any bleeding and no hematoma, No pain/discomfort reported at procedure site. No change in patient status. Continue to monitor patient and status.

## 2017-10-09 ENCOUNTER — OP HISTORICAL/CONVERTED ENCOUNTER (OUTPATIENT)
Dept: OTHER | Age: 67
End: 2017-10-09

## 2017-10-19 ENCOUNTER — CLINICAL SUPPORT (OUTPATIENT)
Dept: CARDIOLOGY CLINIC | Age: 67
End: 2017-10-19

## 2017-10-19 ENCOUNTER — OFFICE VISIT (OUTPATIENT)
Dept: CARDIOLOGY CLINIC | Age: 67
End: 2017-10-19

## 2017-10-19 DIAGNOSIS — Z95.810 PRESENCE OF AUTOMATIC CARDIOVERTER/DEFIBRILLATOR (AICD): Primary | ICD-10-CM

## 2017-10-19 DIAGNOSIS — C34.12 MALIGNANT NEOPLASM OF UPPER LOBE OF LEFT LUNG (HCC): ICD-10-CM

## 2017-10-19 DIAGNOSIS — Z95.810 IMPLANTABLE CARDIOVERTER-DEFIBRILLATOR (ICD) IN SITU: Primary | ICD-10-CM

## 2017-10-19 DIAGNOSIS — I46.9 CARDIAC ARREST (HCC): ICD-10-CM

## 2017-10-19 RX ORDER — CLOPIDOGREL BISULFATE 75 MG/1
TABLET ORAL
COMMUNITY
End: 2018-05-15 | Stop reason: SDUPTHER

## 2017-10-19 RX ORDER — ASPIRIN 81 MG/1
TABLET ORAL DAILY
COMMUNITY
End: 2022-06-06

## 2017-10-19 NOTE — PROGRESS NOTES
Wound Check   Patient is here for wound check. No fever, drainage   He has more nodules left lung and is now on Opdivo  Physical Exam   Constitutional: well-developed and well-nourished. Skin: Left side pocket is healed without drainage, hematoma   Mild redness on medial corner      ASSESSMENT and PLAN     ICD-10-CM ICD-9-CM    1. Implantable cardioverter-defibrillator (ICD) in situ Z95.810 V45.02    2. Cardiac arrest (HCC) I46.9 427.5    3.  Malignant neoplasm of upper lobe of left lung (HCC) C34.12 162.3      I asked him to come back 2 weeks but he said he will look after the incision and calls   He is busy with his lung cancer treatment  I will check ICD today  He is at risk of infection so must keep an eye on the pocket healing     Follow-up Disposition:  Return 3 months I will check via device clinic or remote monitoring in the future

## 2017-10-19 NOTE — MR AVS SNAPSHOT
Visit Information Date & Time Provider Department Dept. Phone Encounter #  
 10/19/2017  3:00 PM Preeti Arceo MD [facility] [place] [phone] [id number] Follow-up Instructions Return in about 3 months (around 1/19/2018). Follow-up and Disposition History Your Appointments 10/19/2017  3:00 PM  
PACEMAKER with [id number] [facility] (ZARINA SCHEDULING) Appt Note: 2 week wound check [address] [place] [address]  
[address] [facility]  
  
    
 10/19/2017  3:00 PM  
ESTABLISHED PATIENT with Preeti Arceo MD  
[facility] Appt Note: 2 week wound check [address] [place]  
[address] [facility]  
  
    
 3/30/2018 10:00 AM  
ESTABLISHED PATIENT with Spike Doshi MD  
[facility] ([facility]) Appt Note: 6 months [address] [place] [address]  
[address] [address] Upcoming Health Maintenance Date Due Hepatitis C Screening 1950 DTaP/Tdap/Td series (1 - Tdap) 2/6/1969 FOBT Q 1 YEAR AGE 50-75 2/6/1998 ZOSTER VACCINE AGE 60> 12/6/2007 GLAUCOMA SCREENING Q2Y 2/6/2013 Pneumococcal 65+ High/Highest Risk (1 of 2 - PCV13) 2/6/2013 MEDICARE YEARLY EXAM 2/6/2013 INFLUENZA AGE 9 TO ADULT 8/1/2017 Allergies as of 10/19/2017  Review Complete On: 10/19/2017 By: Preeti Arceo MD  
 No Known Allergies Current Immunizations  Never Reviewed No immunizations on file. Not reviewed this visit You Were Diagnosed With   
  
 Codes Comments Implantable cardioverter-defibrillator (ICD) in situ    -  Primary ICD-10-CM: Z95.810 ICD-9-CM: V45.02   
 Cardiac arrest [facility])     ICD-10-CM: I46.9 ICD-9-CM: 427.5 Malignant neoplasm of upper lobe of left lung (HCC)     ICD-10-CM: C34.12 
ICD-9-CM: 162.3 Vitals Smoking Status Former Smoker Preferred Pharmacy Pharmacy Name Phone [facility] [address] [facility]  [facility] [phone] Your Updated Medication List  
  
   
This list is accurate as of: 10/19/17  2:40 PM.  Always use your most recent med list.  
  
  
  
  
 0.9% sodium chloride solp 100 mL with nivolumab 40 mg/4 mL soln  
by IntraVENous route once. AMBIEN 10 mg tablet Generic drug:  zolpidem Take  by mouth nightly as needed. aspirin delayed-release 81 mg tablet Take  by mouth daily. atorvastatin 80 mg tablet Commonly known as:  LIPITOR  
TAKE 1 TABLET DAILY docusate sodium 100 mg capsule Commonly known as:  Colt [place] Take 100 mg by mouth two (2) times a day. DYMISTA 137-50 mcg/spray [place] Generic drug:  azelastine-fluticasone  
by Nasal route two (2) times a day. fluticasone-salmeterol 500-50 mcg/dose diskus inhaler Commonly known as:  ADVAIR Take 1 Puff by inhalation every twelve (12) hours. loratadine 10 mg tablet Commonly known as:  [place] Take 10 mg by mouth daily. MUCINEX 1,200 mg Ta12 ER tablet Generic drug:  guaiFENesin Take 1,200 mg by mouth daily. multivitamin tablet Commonly known as:  ONE A DAY Take 1 Tab by mouth daily. nitroglycerin 400 mcg/spray spray Commonly known as:  NITROLINGUAL  
1 [place] by SubLINGual route every five (5) minutes as needed. omega-3 fatty acids-vitamin e 1,000 mg Cap Commonly known as:  FISH OIL Take 1 Cap by mouth daily. OTHER(NON-FORMULARY) Take 1 g by mouth every evening. Indications: 2 units every evening for immune support PLAVIX 75 mg Tab Generic drug:  clopidogrel Take  by mouth. PROBIOTIC 4X 10-15 mg Tbec Generic drug:  B.infantis-B.ani-B.long-B.bifi Take  by mouth two (2) times a day. sotalol 120 mg tablet Commonly known as:  [place] Take 1 Tab by mouth two (2) times a day. tamsulosin 0.4 mg capsule Commonly known as:  FLOMAX Take 0.4 mg by mouth daily. tiotropium 18 mcg inhalation capsule Commonly known as:  [facility] Take 2 Caps by inhalation daily. Follow-up Instructions Return in about 3 months (around 1/19/2018). Patient Instructions You will need to follow up in clinic with Dr. Lizet Lynch in 3 months. Introducing [facility]! Dear Manual Kidney: 
Thank you for requesting a [employer] account. Our records indicate that you already have an active [employer] account. You can access your account anytime at [email or web address] Did you know that you can access your hospital and ER discharge instructions at any time in [employer]? You can also review all of your test results from your hospital stay or ER visit. Additional Information If you have questions, please visit the Frequently Asked Questions section of the [employer] website at [email or web address] Remember, [employer] is NOT to be used for urgent needs. For medical emergencies, dial 911. Now available from your iPhone and Android! Please provide this summary of care documentation to your next provider. Your primary care clinician is listed as Jj Francis. If you have any questions after today's visit, please call [phone].

## 2017-11-23 ENCOUNTER — ED HISTORICAL/CONVERTED ENCOUNTER (OUTPATIENT)
Dept: OTHER | Age: 67
End: 2017-11-23

## 2017-12-05 ENCOUNTER — TELEPHONE (OUTPATIENT)
Dept: CARDIOLOGY CLINIC | Age: 67
End: 2017-12-05

## 2017-12-05 NOTE — TELEPHONE ENCOUNTER
[facility]/ Dr. Melquiades Ugalde is requesting cardiac clearance for patient to have an endoscopic procedure. Clearance to include holding Plavix. It may also include holding aspirin. If okay, how long may he hold? I will fax note to [phone]. Phone # [phone].

## 2017-12-06 NOTE — TELEPHONE ENCOUNTER
Nurys Trujillo from [facility] is following up on the cardiac clearance for pt. Nurys Trujillo can be reached at [phone].   Ext: [phone]    Thank you,  Kayden

## 2017-12-06 NOTE — TELEPHONE ENCOUNTER
Petrona Parks MD   You 15 hours ago (5:09 PM)                 Would hold both for 7 days (Routing comment)         Faxed note

## 2017-12-28 ENCOUNTER — OP HISTORICAL/CONVERTED ENCOUNTER (OUTPATIENT)
Dept: OTHER | Age: 67
End: 2017-12-28

## 2018-01-05 DIAGNOSIS — I46.9 CARDIAC ARREST (HCC): ICD-10-CM

## 2018-01-05 DIAGNOSIS — Z95.810 ICD (IMPLANTABLE CARDIOVERTER-DEFIBRILLATOR) IN PLACE: ICD-10-CM

## 2018-01-05 DIAGNOSIS — I25.10 ATHEROSCLEROSIS OF NATIVE CORONARY ARTERY OF NATIVE HEART WITHOUT ANGINA PECTORIS: ICD-10-CM

## 2018-01-05 DIAGNOSIS — I25.9 ISCHEMIC HEART DISEASE: ICD-10-CM

## 2018-01-05 DIAGNOSIS — E78.5 HYPERLIPIDEMIA, UNSPECIFIED HYPERLIPIDEMIA TYPE: ICD-10-CM

## 2018-01-05 DIAGNOSIS — I49.01 VENTRICULAR FIBRILLATION (HCC): ICD-10-CM

## 2018-01-05 DIAGNOSIS — I25.10 CARDIOVASCULAR DISEASE: ICD-10-CM

## 2018-01-05 DIAGNOSIS — I42.9 CARDIOMYOPATHY (HCC): ICD-10-CM

## 2018-01-05 DIAGNOSIS — I47.29 PAROXYSMAL VENTRICULAR TACHYCARDIA: ICD-10-CM

## 2018-01-05 DIAGNOSIS — I47.1 PAT (PAROXYSMAL ATRIAL TACHYCARDIA) (HCC): ICD-10-CM

## 2018-01-08 RX ORDER — SOTALOL HYDROCHLORIDE 120 MG/1
120 TABLET ORAL 2 TIMES DAILY
Qty: 180 TAB | Refills: 3 | Status: SHIPPED | OUTPATIENT
Start: 2018-01-08 | End: 2018-03-26 | Stop reason: SDUPTHER

## 2018-01-08 RX ORDER — NITROGLYCERIN 400 UG/1
1 SPRAY ORAL
Qty: 1 BOTTLE | Refills: 3 | Status: SHIPPED | OUTPATIENT
Start: 2018-01-08 | End: 2019-01-16 | Stop reason: SDUPTHER

## 2018-01-08 RX ORDER — ATORVASTATIN CALCIUM 80 MG/1
80 TABLET, FILM COATED ORAL
Qty: 90 TAB | Refills: 3 | Status: SHIPPED | OUTPATIENT
Start: 2018-01-08 | End: 2019-02-24 | Stop reason: SDUPTHER

## 2018-01-08 NOTE — TELEPHONE ENCOUNTER
Requested Prescriptions     Signed Prescriptions Disp Refills    nitroglycerin (NITROLINGUAL) 400 mcg/spray spray 1 Bottle 3     Si Spray by SubLINGual route every five (5) minutes as needed. Authorizing Provider: Emma Henson     Ordering User: Lori Morneo atorvastatin (LIPITOR) 80 mg tablet 90 Tab 3     Sig: Take 1 Tab by mouth nightly. Authorizing Provider: Emma Henson     Ordering User: Lori Moreno sotalol (BETAPACE) 120 mg tablet 180 Tab 3     Sig: Take 1 Tab by mouth two (2) times a day.      Authorizing Provider: Emma Henson     Ordering User: Mohan Bazzi    Per Dr. Anabela Araujo verbal orders

## 2018-01-23 ENCOUNTER — CLINICAL SUPPORT (OUTPATIENT)
Dept: CARDIOLOGY CLINIC | Age: 68
End: 2018-01-23

## 2018-01-23 ENCOUNTER — OFFICE VISIT (OUTPATIENT)
Dept: CARDIOLOGY CLINIC | Age: 68
End: 2018-01-23

## 2018-01-23 VITALS
BODY MASS INDEX: 31.98 KG/M2 | HEART RATE: 69 BPM | WEIGHT: 223.4 LBS | DIASTOLIC BLOOD PRESSURE: 70 MMHG | SYSTOLIC BLOOD PRESSURE: 118 MMHG | HEIGHT: 70 IN

## 2018-01-23 DIAGNOSIS — Z95.1 HX OF CABG: ICD-10-CM

## 2018-01-23 DIAGNOSIS — Z95.810 PRESENCE OF AUTOMATIC CARDIOVERTER/DEFIBRILLATOR (AICD): Primary | ICD-10-CM

## 2018-01-23 DIAGNOSIS — I25.10 ATHEROSCLEROSIS OF NATIVE CORONARY ARTERY OF NATIVE HEART WITHOUT ANGINA PECTORIS: ICD-10-CM

## 2018-01-23 DIAGNOSIS — Z95.810 IMPLANTABLE CARDIOVERTER-DEFIBRILLATOR (ICD) IN SITU: Primary | ICD-10-CM

## 2018-01-23 DIAGNOSIS — Z85.118 HX OF CANCER OF LUNG: ICD-10-CM

## 2018-01-23 DIAGNOSIS — Z51.81 ENCOUNTER FOR MONITORING SOTALOL THERAPY: ICD-10-CM

## 2018-01-23 DIAGNOSIS — I48.92 PAROXYSMAL ATRIAL FLUTTER (HCC): ICD-10-CM

## 2018-01-23 DIAGNOSIS — Z79.899 ENCOUNTER FOR MONITORING SOTALOL THERAPY: ICD-10-CM

## 2018-01-23 DIAGNOSIS — I47.1 PAT (PAROXYSMAL ATRIAL TACHYCARDIA) (HCC): ICD-10-CM

## 2018-01-23 DIAGNOSIS — I49.01 VENTRICULAR FIBRILLATION (HCC): ICD-10-CM

## 2018-01-23 DIAGNOSIS — I46.9 CARDIAC ARREST (HCC): ICD-10-CM

## 2018-01-23 DIAGNOSIS — I50.9 HEART FAILURE, NYHA CLASS 1 (HCC): ICD-10-CM

## 2018-01-23 DIAGNOSIS — I25.5 ISCHEMIC CARDIOMYOPATHY: ICD-10-CM

## 2018-01-23 NOTE — MR AVS SNAPSHOT
[address] [facility] [address] 
[phone] Patient: Jimena Sanchez MRN: [id number] OPX:[id number] Visit Information Date & Time Provider Department Dept. Phone Encounter #  
 1/23/2018  2:00 PM Cezar Guerrero MD [facility] Aidan Morelos [phone] [id number] Follow-up Instructions Return in about 1 year (around 1/23/2019). Your Appointments 3/30/2018 10:00 AM  
ESTABLISHED PATIENT with Noelle Severs, MD  
[facility] ([facility]) Appt Note: 6 months [address] [facility],[address]  
[address] [address] Upcoming Health Maintenance Date Due Hepatitis C Screening 1950 DTaP/Tdap/Td series (1 - Tdap) 2/6/1969 FOBT Q 1 YEAR AGE 50-75 2/6/1998 ZOSTER VACCINE AGE 60> 12/6/2007 GLAUCOMA SCREENING Q2Y 2/6/2013 Pneumococcal 65+ High/Highest Risk (1 of 2 - PCV13) 2/6/2013 MEDICARE YEARLY EXAM 2/6/2013 Influenza Age 5 to Adult 8/1/2017 Allergies as of 1/23/2018  Review Complete On: 1/23/2018 By: Cezar Guerrero MD  
 No Known Allergies Current Immunizations  Never Reviewed No immunizations on file. Not reviewed this visit You Were Diagnosed With   
  
 Codes Comments Implantable cardioverter-defibrillator (ICD) in situ    -  Primary ICD-10-CM: Z95.810 ICD-9-CM: V45.02   
 PAT (paroxysmal atrial tachycardia) (HCC)     ICD-10-CM: I47.1 ICD-9-CM: 427.0 Paroxysmal atrial flutter (HCC)     ICD-10-CM: I48.92 
ICD-9-CM: 427.32 Atherosclerosis of native coronary artery of native heart without angina pectoris     ICD-10-CM: I25.10 ICD-9-CM: 414.01 Ventricular fibrillation (HCC)     ICD-10-CM: I49.01 
ICD-9-CM: 427.41 Cardiac arrest [facility])     ICD-10-CM: I46.9 ICD-9-CM: 427.5 Ischemic cardiomyopathy     ICD-10-CM: I25.5 ICD-9-CM: 414.8 Hx of CABG     ICD-10-CM: Z95.1 ICD-9-CM: V45.81 Hx of cancer of lung     ICD-10-CM: Z85.118 
ICD-9-CM: V10.11 Encounter for monitoring sotalol therapy     ICD-10-CM: Z51.81, Z79.899 ICD-9-CM: V58.83, V58.69 Heart failure, NYHA class 1 (HCC)     ICD-10-CM: I50.9 ICD-9-CM: 428.9 Vitals BP Pulse Height(growth percentile) Smoking Status 118/70 (BP 1 Location: Left arm, BP Patient Position: Sitting) 69 5' 10\" (1.778 m) Former Smoker Vitals History Preferred Pharmacy Pharmacy Name Phone [address], [facility] [phone] Your Updated Medication List  
  
   
This list is accurate as of: 1/23/18  2:12 PM.  Always use your most recent med list.  
  
  
  
  
 0.9% sodium chloride solp 100 mL with nivolumab 40 mg/4 mL soln  
by IntraVENous route once. AMBIEN 10 mg tablet Generic drug:  zolpidem Take  by mouth nightly as needed. aspirin delayed-release 81 mg tablet Take  by mouth daily. atorvastatin 80 mg tablet Commonly known as:  LIPITOR Take 1 Tab by mouth nightly. docusate sodium 100 mg capsule Commonly known as:  Julius Cara Take 100 mg by mouth two (2) times a day. DYMISTA 137-50 mcg/spray [place] Generic drug:  azelastine-fluticasone  
by Nasal route two (2) times a day. fluticasone-salmeterol 500-50 mcg/dose diskus inhaler Commonly known as:  ADVAIR Take 1 Puff by inhalation every twelve (12) hours. loratadine 10 mg tablet Commonly known as:  Karl Lieu Take 10 mg by mouth daily. MAGNESIUM CITRATE PO Take 1 Tab by mouth daily. MUCINEX 1,200 mg Ta12 ER tablet Generic drug:  guaiFENesin Take 1,200 mg by mouth daily. multivitamin tablet Commonly known as:  ONE A DAY Take 1 Tab by mouth daily. nitroglycerin 400 mcg/spray spray Commonly known as:  Lorenzo Martinez 1 Spray by SubLINGual route every five (5) minutes as needed. omega-3 fatty acids-vitamin e 1,000 mg Cap Commonly known as:  FISH OIL Take 1 Cap by mouth daily. OTHER(NON-FORMULARY) Take 1 g by mouth every evening. Indications: 2 units every evening for immune support PLAVIX 75 mg Tab Generic drug:  clopidogrel Take  by mouth. PROBIOTIC 4X 10-15 mg Tbec Generic drug:  B.infantis-B.ani-B.long-B.bifi Take  by mouth two (2) times a day. PSYLLIUM PO Take 2 Tabs by mouth daily. sotalol 120 mg tablet Commonly known as:  Lyndel Big Take 1 Tab by mouth two (2) times a day. tamsulosin 0.4 mg capsule Commonly known as:  FLOMAX Take 0.4 mg by mouth daily. TUDORZA PRESSAIR 400 mcg/actuation inhaler Generic drug:  aclidinium bromide Take 1 Puff by inhalation. Follow-up Instructions Return in about 1 year (around 1/23/2019). Introducing [facility]! Dear Carlitos Monsalve: 
Thank you for requesting a [employer] account. Our records indicate that you already have an active [employer] account. You can access your account anytime at [email or web address] Did you know that you can access your hospital and ER discharge instructions at any time in [employer]? You can also review all of your test results from your hospital stay or ER visit. Additional Information If you have questions, please visit the Frequently Asked Questions section of the [employer] website at [email or web address] Remember, [employer] is NOT to be used for urgent needs. For medical emergencies, dial 911. Now available from your iPhone and Android! Please provide this summary of care documentation to your next provider. Your primary care clinician is listed as Leonidas Jones. If you have any questions after today's visit, please call [phone].

## 2018-01-23 NOTE — PROGRESS NOTES
Cardiac Electrophysiology Office Note     Subjective: Mario Mccrary is a 69 y.o. man with hx of VT on ICD   He had ICD replaced 10/2017 (old MI, ischemic cardiomyopathy and history of sudden cardiac death. He has chronic systolic CHF with LVEF 69% on recent stress test.)  He is patient of Dr Redding Caller test 10/2016 read by Dr Mallory Escamilla, mostly scars and LVEF 42%  He used steroid for COPD and got better    stress test 2/2010: mild rev ischemia in inferolateral wall mixed with mainly fixed defect, LVEF 49%  A self terminating episode of VT (in VF zone) in June, 2012 seen on AICD  He has been on sotalol and the last ICD check in January did not show ventricular tachycardia but rather he had several episodes of nonsustained atrial tachycardia and the longest episode was about 10 seconds. He was completely unaware of the atrial tachycardia episode. No shortness of breath, or limitation in functional capacity. His NYHA class is I. There is no orthopnea or PND. The patient has not gotten an appropriate or inappropriate ICD shock since the last visit   He is on Opdivo for lung cancer and said he has good response  He has muscle pain from taking Opdivo and uses aspirin daily with plavix    ICD [facility] lead was examined under fluoroscopy. There was no evidence of lead breakdown. There is no evidence of electrical noises so far on the lead. This ICD lead is on recall.                           Record from Dr Demetrice Whalen: 3 cm left upper lobe lung cancer stage IB  ICD Riata lead with  KRISTEN 3/3/2010        Problem List  Date Reviewed: 1/23/2018          Codes Class Noted    Presence of -recalled ICD lead, subsequent encounter ICD-10-CM: T82.198D  ICD-9-CM: V58.89, 996.04  10/2/2017    Overview Signed 10/2/2017  5:33 PM by MD RODRIGO Gibbs ICD lead             Lung cancer, upper lobe ([facility] [address] ICD-10-CM: C34.10  ICD-9-CM: 162.3  9/27/2015    Overview Signed 9/27/2015  5:49 PM by Franklin Bagley MD      3 cm left upper lobe lung cancer stage IB 9/2015             PAT (paroxysmal atrial tachycardia) (HCC) ICD-10-CM: I47.1  ICD-9-CM: 427.0  Unknown        Encounter for long-term (current) use of high-risk medication ICD-10-CM: Z79.899  ICD-9-CM: V58.69  7/27/2012    Overview Signed 7/27/2012  9:07 AM by Kalee Rueda MD     sotalol             Coronary atherosclerosis of native coronary artery ICD-10-CM: I25.10  ICD-9-CM: 414.01  4/26/2012    Overview Addendum 4/5/2017  7:00 AM by Sisi Chambers MD     2/10 lexiscan cardiolyte, mostly fixed inferolateral defect. lvef 49%  9/16 lexiscan cardiolyte, mostly fixed inferolateral defect  lvef 42%             Cardiomyopathy ([facility] [address] ICD-10-CM: I42.9  ICD-9-CM: 425.4  4/26/2012        Unspecified cardiovascular disease ICD-10-CM: I25.10  ICD-9-CM: 429.2  9/14/2010    Overview Signed 10/13/2010 12:52 PM by Valetta Mohs, MD     Mr. Yunior Perez cardiac history dates back to 1995 when he presented with unstable angina while living in [place] and underwent balloon angioplasty-vessel unknown. In June 2004, he had a cardiac arrest associated with an acute lateral MI. Cardiac catheterization at that time showed severe two vessel coronary disease and he had stents placed in both his obtuse marginal, and a week later in his right coronary artery. In January 2005, he had recurrent symptoms in association with an abnormal stress test and had a drug-eluting stent placed within the previously stented segment of his obtuse marginal branch. Last cardiac catheterization was in July 2005, and it showed him to have stable anatomy. He had an AICD implanted in 2004 after his out of hospital arrest. Repeat cath 2/10 show instent restenosis rca, treated at that time with BARB. LVEF 2/10 40-45%. Cardiac arrest [facility]) ICD-10-CM: I46.9  ICD-9-CM: 427.5  9/14/2010    Overview Signed 10/2/2017  5:32 PM by Kalee Rueda MD     Secondary prevention of sudden death.   The initial ICD was implanted for this in [place] when he collapsed and was in VT in the parking lot of [id number]             Other specified forms of chronic ischemic heart disease ICD-10-CM: I25.89  ICD-9-CM: 414.8  9/14/2010        Implantable cardioverter-defibrillator (ICD) in situ ICD-10-CM: Z95.810  ICD-9-CM: V45.02  9/14/2010    Overview Addendum 10/2/2017  5:32 PM by Jana Meyer MD     Generator change 10/02/17  Dr Delores Cartwright  Secondary prevention of sudden death. The initial ICD was implanted for this in [place] when he collapsed and was in VT in the parking lot of the [employer]             Hyperlipidemia, unspecified ICD-10-CM: E78.5  ICD-9-CM: 272.4  9/14/2010        Paroxysmal ventricular tachycardia ([address] ICD-10-CM: I47.2  ICD-9-CM: 427.1  9/14/2010        Ventricular fibrillation ([facility] [address] ICD-10-CM: I49.01  ICD-9-CM: 427.41  9/14/2010                Current Outpatient Prescriptions   Medication Sig Dispense Refill    MAGNESIUM CITRATE PO Take 1 Tab by mouth daily.  PSYLLIUM SEED (PSYLLIUM PO) Take 2 Tabs by mouth daily.  aclidinium bromide (TUDORZA PRESSAIR) 400 mcg/actuation inhaler Take 1 Puff by inhalation.  nitroglycerin (NITROLINGUAL) 400 mcg/spray spray 1 Spray by SubLINGual route every five (5) minutes as needed. 1 Bottle 3    atorvastatin (LIPITOR) 80 mg tablet Take 1 Tab by mouth nightly. 90 Tab 3    sotalol (BETAPACE) 120 mg tablet Take 1 Tab by mouth two (2) times a day. 180 Tab 3    aspirin delayed-release 81 mg tablet Take  by mouth daily.  clopidogrel (PLAVIX) 75 mg tab Take  by mouth.  0.9% sodium chloride solp 100 mL with nivolumab 40 mg/4 mL soln by IntraVENous route once.  guaiFENesin (MUCINEX) 1,200 mg Ta12 ER tablet Take 1,200 mg by mouth daily.  loratadine (CLARITIN) 10 mg tablet Take 10 mg by mouth daily.  azelastine-fluticasone (DYMISTA) 137-50 mcg/spray spry by Nasal route two (2) times a day.       fluticasone-salmeterol (ADVAIR) 500-50 mcg/dose diskus inhaler Take 1 Puff by inhalation every twelve (12) hours.  docusate sodium (COLACE) 100 mg capsule Take 100 mg by mouth two (2) times a day.  tamsulosin (FLOMAX) 0.4 mg capsule Take 0.4 mg by mouth daily.  OTHER,NON-FORMULARY, Take 1 g by mouth every evening. Indications: 2 units every evening for immune support      multivitamin (ONE A DAY) tablet Take 1 Tab by mouth daily.  B.infantis-B.ani-B.long-B.bifi (PROBIOTIC 4X) 10-15 mg TbEC Take  by mouth two (2) times a day.  omega-3 fatty acids-vitamin e (FISH OIL) 1,000 mg cap Take 1 Cap by mouth daily. 90 Cap 3    zolpidem (AMBIEN) 10 mg tablet Take  by mouth nightly as needed.  tiotropium (SPIRIVA) 18 mcg inhalation capsule Take 2 Caps by inhalation daily.        No Known Allergies  Past Medical History:   Diagnosis Date    Automatic implantable cardiac defibrillator in situ 9/14/2010    Cardiac arrest ([facility] [address] 9/14/2010    ICD (implantable cardiac defibrillator) in place     Other and unspecified hyperlipidemia 9/14/2010    Other specified forms of chronic ischemic heart disease 9/14/2010    Paroxysmal ventricular tachycardia ([address] 9/14/2010    PAT (paroxysmal atrial tachycardia) ([address]     Unspecified cardiovascular disease 9/14/2010    Ventricular fibrillation ([address] 9/14/2010     Past Surgical History:   Procedure Laterality Date    HX CHOLECYSTECTOMY      6/07    HX HEART CATHETERIZATION      NASAL SURG PROC UNLISTED      polyps and deviated septum    VT EPHYS EVAL PACG CVDFB LDS INITIAL IMPLAN/REPLACE  10/2/2017         VT RMVL IMPLTBL DFB PLSE GEN W/RPLCMT PLSE GEN 2 LD  10/2/2017         XR FLUOROSCOPY UNDER 60 MINUTES  10/3/2012            Social History   Substance Use Topics    Smoking status: Former Smoker     Quit date: 7/12/2004    Smokeless tobacco: Never Used    Alcohol use 0.0 oz/week     0 Standard drinks or equivalent per week      Comment: occasional        Review of Systems:   Constitutional: Negative for fever, chills, weight loss, malaise/fatigue. HEENT: Negative for nosebleeds, vision changes. Respiratory: Negative for cough, and wheezing. Cardiovascular: Negative for palpitations, orthopnea, claudication, leg swelling, syncope, and PND. Gastrointestinal: Negative for nausea, vomiting, diarrhea, melena. Genitourinary: Negative for hematuria. Musculoskeletal: Negative for myalgias, arthralgia. Skin: Negative for rash. Heme: Does not bleed or bruise easily. Neurological: Negative for speech change and focal weakness     Objective:     Visit Vitals    /70 (BP 1 Location: Left arm, BP Patient Position: Sitting)    Pulse 69    Ht 5' 10\" (1.778 m)    Wt 223 lb 6.4 oz (101.3 kg)    BMI 32.05 kg/m2        Physical Exam:   Constitutional: well-developed and well-nourished. No distress. Head: Normocephalic and atraumatic. Eyes: Pupils are equal, round  Neck: supple. No JVD present. Cardiovascular: Normal rate, regular rhythm and normal heart sounds. Exam reveals no gallop and no friction rub. No murmur heard. Pulmonary/Chest: Effort normal and breath sounds normal. No wheezes. Abdominal: Soft, obese  Musculoskeletal: no edema. Neurological: alert,oriented. Skin: Skin is warm and dry and ICD site is healed  Psychiatric: normal mood and affect. Behavior is normal. Judgment and thought content normal.           Assessment/Plan:       ICD-10-CM ICD-9-CM    1. Implantable cardioverter-defibrillator (ICD) in situ Z95.810 V45.02    2. PAT (paroxysmal atrial tachycardia) (HCC) I47.1 427.0    3. Paroxysmal atrial flutter (HCC) I48.92 427.32    4. Atherosclerosis of native coronary artery of native heart without angina pectoris I25.10 414.01    5. Ventricular fibrillation (HCC) I49.01 427.41    6. Cardiac arrest ([facility]) I46.9 427.5    7. Ischemic cardiomyopathy I25.5 414.8    8. Hx of CABG Z95.1 V45.81    9. Hx of cancer of lung Z85. 118 V10.11    10.  Encounter for monitoring sotalol therapy Z51.81 V58.83     Z79.899 V58.69    11. Heart failure, NYHA class 1 ([facility]) I50.9 428.9           The patient gets GDMT medication optimization when seeing Dr Addis Cook  Used to have PVCs and PAT  He wants to stay on sotalol  NYHA class 1  He has 8 min of atrial flutter 1/1/2018   He gets disability from [place] from agent orange exposure in [facility].  COPD is dx by his pulmonologist  patient understands and would like to continue to monitor in office as usual.    He does not want ICD Riata lead extracted and replaced      Follow-up Disposition:  Return in about 1 year (around 1/23/2019). Precious Gonsalves M.D.   Electrophysiology/Cardiology  [facility] [place]  [facility], [facility] [address] [place] [address]  [phone]

## 2018-01-29 ENCOUNTER — OP HISTORICAL/CONVERTED ENCOUNTER (OUTPATIENT)
Dept: OTHER | Age: 68
End: 2018-01-29

## 2018-03-26 ENCOUNTER — OP HISTORICAL/CONVERTED ENCOUNTER (OUTPATIENT)
Dept: OTHER | Age: 68
End: 2018-03-26

## 2018-03-26 DIAGNOSIS — I25.10 CARDIOVASCULAR DISEASE: ICD-10-CM

## 2018-03-26 DIAGNOSIS — I25.9 ISCHEMIC HEART DISEASE: ICD-10-CM

## 2018-03-26 DIAGNOSIS — I47.1 PAT (PAROXYSMAL ATRIAL TACHYCARDIA) (HCC): ICD-10-CM

## 2018-03-26 DIAGNOSIS — I49.01 VENTRICULAR FIBRILLATION (HCC): ICD-10-CM

## 2018-03-26 DIAGNOSIS — I47.29 PAROXYSMAL VENTRICULAR TACHYCARDIA: ICD-10-CM

## 2018-03-26 DIAGNOSIS — E78.5 HYPERLIPIDEMIA, UNSPECIFIED HYPERLIPIDEMIA TYPE: ICD-10-CM

## 2018-03-26 DIAGNOSIS — I42.9 CARDIOMYOPATHY (HCC): ICD-10-CM

## 2018-03-26 DIAGNOSIS — I46.9 CARDIAC ARREST (HCC): ICD-10-CM

## 2018-03-26 DIAGNOSIS — Z95.810 ICD (IMPLANTABLE CARDIOVERTER-DEFIBRILLATOR) IN PLACE: ICD-10-CM

## 2018-03-27 RX ORDER — SOTALOL HYDROCHLORIDE 120 MG/1
120 TABLET ORAL 2 TIMES DAILY
Qty: 180 TAB | Refills: 0 | Status: SHIPPED | OUTPATIENT
Start: 2018-03-27 | End: 2018-08-24 | Stop reason: SDUPTHER

## 2018-03-27 NOTE — TELEPHONE ENCOUNTER
Requested Prescriptions     Signed Prescriptions Disp Refills    sotalol (BETAPACE) 120 mg tablet 180 Tab 0     Sig: Take 1 Tab by mouth two (2) times a day.      Authorizing Provider: Dereje Iyer     Ordering User: Nancie Villagran    Per Dr. Izabela Asif verbal orders

## 2018-03-30 ENCOUNTER — OFFICE VISIT (OUTPATIENT)
Dept: CARDIOLOGY CLINIC | Age: 68
End: 2018-03-30

## 2018-03-30 VITALS
HEIGHT: 71 IN | RESPIRATION RATE: 16 BRPM | BODY MASS INDEX: 30.94 KG/M2 | SYSTOLIC BLOOD PRESSURE: 124 MMHG | HEART RATE: 63 BPM | OXYGEN SATURATION: 93 % | WEIGHT: 221 LBS | DIASTOLIC BLOOD PRESSURE: 78 MMHG

## 2018-03-30 DIAGNOSIS — I25.10 ATHEROSCLEROSIS OF NATIVE CORONARY ARTERY OF NATIVE HEART WITHOUT ANGINA PECTORIS: Primary | ICD-10-CM

## 2018-03-30 DIAGNOSIS — I25.5 ISCHEMIC CARDIOMYOPATHY: ICD-10-CM

## 2018-03-30 DIAGNOSIS — I47.29 PAROXYSMAL VENTRICULAR TACHYCARDIA: ICD-10-CM

## 2018-03-30 DIAGNOSIS — E78.2 MIXED HYPERLIPIDEMIA: ICD-10-CM

## 2018-03-30 DIAGNOSIS — Z95.810 IMPLANTABLE CARDIOVERTER-DEFIBRILLATOR (ICD) IN SITU: ICD-10-CM

## 2018-03-30 PROBLEM — I71.40 ABDOMINAL AORTIC ANEURYSM (AAA): Status: ACTIVE | Noted: 2018-03-30

## 2018-03-30 RX ORDER — PREDNISONE 10 MG/1
TABLET ORAL
COMMUNITY

## 2018-03-30 NOTE — MR AVS SNAPSHOT
[address] [place] 
[phone] Patient: Jyoti Ugalde MRN: [id number] CHUCK:[birth year] Visit Information Date & Time Provider Department Dept. Phone Encounter #  
 3/30/2018 10:00 AM Ed Reed MD [facility] Poster [phone] [id number] Follow-up Instructions Return in about 6 months (around 9/30/2018). Your Appointments 4/30/2018  3:00 PM  
PROCEDURE with Heather Taveras [facility] ([facility] SCH[facility]) Appt Note: sjm ICD/rc b 1-23-18  
 [address] [place]  
[address] [address]  
  
    
 8/1/2018 11:30 AM  
PROCEDURE with Heather Taveras [facility] ([facility] SCHEDULING) Appt Note: sjm ICD/rc b  
 [address] [place]  
[phone]  
  
    
 9/28/2018  9:20 AM  
ESTABLISHED PATIENT with Ed Reed MD  
[facility] ([address]) Appt Note: 6 mo f/u  
 [address] [place]  
[address] [address]  
  
    
 11/5/2018 11:00 AM  
PROCEDURE with Heather Taveras [facility] ([facility] SCHEDULING) Appt Note: sjm ICD/rc b  
 [address] [address]  
[phone]  
  
    
 2/19/2019  1:00 PM  
PROCEDURE with LIBERTY3Mata [facility] ([facility] SCHEDULING) Appt Note: sjm ICD/rc/Berrios annual b  
 [address] [place]  
[address] [facility]  
  
    
 2/19/2019  1:20 PM  
ESTABLISHED PATIENT with Johnnie Galeano MD  
[facility] ([address]) Appt Note: sjm ICD/rc/Berrios annual b; sjm ICD/rc/Berrios annual b  
 [address]  [address] [place]  
[address] [address] Upcoming Health Maintenance Date Due Hepatitis C Screening 1950 DTaP/Tdap/Td series (1 - Tdap) 2/6/1969 FOBT Q 1 YEAR AGE 50-75 2/6/1998 ZOSTER VACCINE AGE 60> 12/6/2007 GLAUCOMA SCREENING Q2Y 2/6/2013 Pneumococcal 65+ High/Highest Risk (1 of 2 - PCV13) 2/6/2013 Influenza Age 5 to Adult 8/1/2017 MEDICARE YEARLY EXAM 3/14/2018 Allergies as of 3/30/2018  Review Complete On: 3/30/2018 By: Renee Taylor MD  
 No Known Allergies Current Immunizations  Never Reviewed No immunizations on file. Not reviewed this visit You Were Diagnosed With   
  
 Codes Comments Atherosclerosis of native coronary artery of native heart without angina pectoris    -  Primary ICD-10-CM: I25.10 ICD-9-CM: 414.01 Ischemic cardiomyopathy     ICD-10-CM: I25.5 ICD-9-CM: 414.8 Mixed hyperlipidemia     ICD-10-CM: E78.2 ICD-9-CM: 272.2 Paroxysmal ventricular tachycardia (HCC)     ICD-10-CM: I47.2 ICD-9-CM: 427.1 Implantable cardioverter-defibrillator (ICD) in situ     ICD-10-CM: Z95.810 ICD-9-CM: V45.02 Vitals BP Pulse Resp Height(growth percentile) Weight(growth percentile) SpO2  
 124/78 63 16 5' 11\" (1.803 m) 221 lb (100.2 kg) 93% BMI Smoking Status 30.82 kg/m2 Former Smoker BMI and BSA Data Body Mass Index Body Surface Area  
 30.82 kg/m 2 2.24 m 2 Preferred Pharmacy Pharmacy Name Phone [address] [facility] [phone] Your Updated Medication List  
  
   
This list is accurate as of 3/30/18 10:27 AM.  Always use your most recent med list.  
  
  
  
  
 0.9% sodium chloride solp 100 mL with nivolumab 40 mg/4 mL soln  
by IntraVENous route once. AMBIEN 10 mg tablet Generic drug:  zolpidem Take  by mouth nightly as needed. aspirin delayed-release 81 mg tablet Take  by mouth daily. atorvastatin 80 mg tablet Commonly known as:  LIPITOR Take 1 Tab by mouth nightly. docusate sodium 100 mg capsule Commonly known as:  Addis Galea Take 100 mg by mouth two (2) times a day. DYMISTA 137-50 mcg/spray [place] Generic drug:  azelastine-fluticasone  
by Nasal route two (2) times a day. fluticasone-salmeterol 500-50 mcg/dose diskus inhaler Commonly known as:  ADVAIR Take 1 Puff by inhalation every twelve (12) hours. loratadine 10 mg tablet Commonly known as:  Vandana Eagan Take 10 mg by mouth daily. MAGNESIUM CITRATE PO Take 1 Tab by mouth daily. MUCINEX 1,200 mg Ta12 ER tablet Generic drug:  guaiFENesin Take 1,200 mg by mouth daily. multivitamin tablet Commonly known as:  ONE A DAY Take 1 Tab by mouth daily. nitroglycerin 400 mcg/spray spray Commonly known as:  NITROLINGUAL  
1 [place] by SubLINGual route every five (5) minutes as needed. omega-3 fatty acids-vitamin e 1,000 mg Cap Commonly known as:  FISH OIL Take 1 Cap by mouth daily. OTHER(NON-FORMULARY) Take 1 g by mouth every evening. Indications: 2 units every evening for immune support PLAVIX 75 mg Tab Generic drug:  clopidogrel Take  by mouth.  
  
 predniSONE 10 mg tablet Commonly known as:  Henrik Curd Take  by mouth daily (with breakfast). PROBIOTIC 4X 10-15 mg [place] Generic drug:  B.infantis-B.ani-B.long-B.bifi Take  by mouth two (2) times a day. PSYLLIUM PO Take 2 Tabs by mouth daily. sotalol 120 mg tablet Commonly known as:  Almon Lesch Take 1 Tab by mouth two (2) times a day. tamsulosin 0.4 mg capsule Commonly known as:  FLOMAX Take 0.4 mg by mouth daily. TUDORZA PRESSAIR 400 mcg/actuation inhaler Generic drug:  aclidinium bromide Take 1 Puff by inhalation. Follow-up Instructions Return in about 6 months (around 9/30/2018). Introducing [facility]! Dear Rogelio Koroma: 
Thank you for requesting a [email or web address] account. Our records indicate that you already have an active [email or web address] account. You can access your account anytime at [email or web address] Did you know that you can access your hospital and ER discharge instructions at any time in [email or web address]? You can also review all of your test results from your hospital stay or ER visit. Additional Information If you have questions, please visit the Frequently Asked Questions section of the [email or web address] website at [email or web address] Remember, [email or web address] is NOT to be used for urgent needs. For medical emergencies, dial 911. Now available from your iPhone and Android! Please provide this summary of care documentation to your next provider. Your primary care clinician is listed as Maryann Rust. If you have any questions after today's visit, please call [phone].

## 2018-03-30 NOTE — PROGRESS NOTES
HISTORY OF PRESENT ILLNESS  Tiera Palacios is a 77 y.o. male     SUMMARY:   Problem List  Date Reviewed: 3/30/2018          Codes Class Noted    Presence of -recalled ICD lead, subsequent encounter ICD-10-CM: T82.198D  ICD-9-CM: V58.89, 996.04  10/2/2017    Overview Signed 10/2/2017  5:33 PM by Isabell Alba MD     RIATA ICD lead             L[facility] ICD-10-CM: C34.10  ICD-9-CM: 162.3  9/27/2015    Overview Signed 9/27/2015  5:49 PM by Isabell Alba MD      3 cm left upper lobe lung cancer stage IB 9/2015             PAT (paroxysmal atrial tachycardia) ([email or web address] ICD-10-CM: I47.1  ICD-9-CM: 427.0  Unknown        Encounter for long-term (current) use of high-risk medication ICD-10-CM: Z79.899  ICD-9-CM: V58.69  7/27/2012    Overview Signed 7/27/2012  9:07 AM by Isabell Alba MD     sotalol             Coronary atherosclerosis of native coronary artery ICD-10-CM: I25.10  ICD-9-CM: 414.01  4/26/2012    Overview Addendum 4/5/2017  7:00 AM by Jina Morataya MD     2/10 lexiscan cardiolyte, mostly fixed inferolateral defect. lvef 49%  9/16 lexiscan cardiolyte, mostly fixed inferolateral defect  lvef 42%             Cardiomyopathy ([facility] ICD-10-CM: I42.9  ICD-9-CM: 425.4  4/26/2012        Unspecified cardiovascular disease ICD-10-CM: I25.10  ICD-9-CM: 429.2  9/14/2010    Overview Signed 10/13/2010 12:52 PM by Vaishnavi Salgado MD     Mr. Olivia Rutherford cardiac history dates back to 1995 when he presented with unstable angina while living in [place] and underwent balloon angioplasty-vessel unknown. In June 2004, he had a cardiac arrest associated with an acute lateral MI. Cardiac catheterization at that time showed severe two vessel coronary disease and he had stents placed in both his obtuse marginal, and a week later in his right coronary artery.  In January 2005, he had recurrent symptoms in association with an abnormal stress test and had a drug-eluting stent placed within the previously stented segment of his obtuse marginal branch. Last cardiac catheterization was in July 2005, and it showed him to have stable anatomy. He had an AICD implanted in 2004 after his out of hospital arrest. Repeat cath 2/10 show instent restenosis rca, treated at that time with BARB. LVEF 2/10 40-45%. Cardiac arrest [facility]) ICD-10-CM: I46.9  ICD-9-CM: 427.5  9/14/2010    Overview Signed 10/2/2017  5:32 PM by Vannessa Castro MD     Secondary prevention of sudden death. The initial ICD was implanted for this in [place] when [place] and was in [place] in the parking [facility]             Other specified forms of chronic ischemic heart disease ICD-10-CM: I25.89  ICD-9-CM: 414.8  9/14/2010        Implantable cardioverter-defibrillator (ICD) in situ ICD-10-CM: Z95.810  ICD-9-CM: V45.02  9/14/2010    Overview Addendum 10/2/2017  5:32 PM by Vannessa Castro MD     Generator change 10/02/17  Dr Patrice Rasheed  Secondary prevention of sudden death. The initial ICD was implanted for this in [place] when he collapsed and was in [place] in the parking lot of the [employer]             Hyperlipidemia, unspecified ICD-10-CM: E78.5  ICD-9-CM: 272.4  9/14/2010        Paroxysmal ventricular tachycardia (HCC) ICD-10-CM: I47.2  ICD-9-CM: 427.1  9/14/2010        Ventricular fibrillation ([facility] ICD-10-CM: I49.01  ICD-9-CM: 427.41  9/14/2010              Current Outpatient Prescriptions on File Prior to Visit   Medication Sig    sotalol (BETAPACE) 120 mg tablet Take 1 Tab by mouth two (2) times a day.  MAGNESIUM CITRATE PO Take 1 Tab by mouth daily.  PSYLLIUM SEED (PSYLLIUM PO) Take 2 Tabs by mouth daily.  aclidinium bromide (TUDORZA PRESSAIR) 400 mcg/actuation inhaler Take 1 Puff by inhalation.  nitroglycerin (NITROLINGUAL) 400 mcg/spray spray 1 Spray by SubLINGual route every five (5) minutes as needed.  atorvastatin (LIPITOR) 80 mg tablet Take 1 Tab by mouth nightly.  clopidogrel (PLAVIX) 75 mg tab Take  by mouth.  0.9% sodium chloride solp 100 mL with nivolumab 40 mg/4 mL soln by IntraVENous route once.  guaiFENesin (MUCINEX) 1,200 mg Ta12 ER tablet Take 1,200 mg by mouth daily.  loratadine (CLARITIN) 10 mg tablet Take 10 mg by mouth daily.  azelastine-fluticasone (DYMISTA) 137-50 mcg/spray spry by Nasal route two (2) times a day.  fluticasone-salmeterol (ADVAIR) 500-50 mcg/dose diskus inhaler Take 1 Puff by inhalation every twelve (12) hours.  docusate sodium (COLACE) 100 mg capsule Take 100 mg by mouth two (2) times a day.  tamsulosin (FLOMAX) 0.4 mg capsule Take 0.4 mg by mouth daily.  OTHER,NON-FORMULARY, Take 1 g by mouth every evening. Indications: 2 units every evening for immune support    multivitamin (ONE A DAY) tablet Take 1 Tab by mouth daily.  B.infantis-B.ani-B.long-B.bifi (PROBIOTIC 4X) 10-15 mg TbEC Take  by mouth two (2) times a day.  omega-3 fatty acids-vitamin e (FISH OIL) 1,000 mg cap Take 1 Cap by mouth daily.  zolpidem (AMBIEN) 10 mg tablet Take  by mouth nightly as needed.  aspirin delayed-release 81 mg tablet Take  by mouth daily. No current facility-administered medications on file prior to visit. CARDIOLOGY STUDIES TO DATE:  2/10 lexiscan cardiolyte, mostly fixed inferolateral defect. lvef 49%  9/16 lexiscan cardiolyte, mostly fixed inferolateral defect  lvef 42%         Chief Complaint   Patient presents with    Coronary Artery Disease     HPI :  Mr. Анна Robles is doing okay, though he is having progressive shortness of breath with activity related to his lung issues and treatment for recurrent lung cancer. His most recent scan showed that he was disease free. Primary care is following his lipids and since we last met, he had his ICD generator changed.         CARDIAC ROS:   negative for chest pain, palpitations, syncope, orthopnea, paroxysmal nocturnal dyspnea, exertional chest pressure/discomfort, claudication, lower extremity edema    Family History Problem Relation Age of Onset    Heart Disease Neg Hx        Past Medical History:   Diagnosis Date    Automatic implantable cardiac defibrillator in situ 9/14/2010    Cardiac arrest ([facility] 9/14/2010    ICD (implantable cardiac defibrillator) in place     Other and unspecified hyperlipidemia 9/14/2010    Other specified forms of chronic ischemic heart disease 9/14/2010    Paroxysmal ventricular tachycardia ([facility] 9/14/2010    PAT (paroxysmal atrial tachycardia) ([facility]     Unspecified cardiovascular disease 9/14/2010    [facility] ([facility] 9/14/2010       GENERAL ROS:  A comprehensive review of systems was negative except for that written in the HPI.     Visit Vitals    /78    Pulse 63    Resp 16    Ht 5' 11\" (1.803 m)    Wt 221 lb (100.2 kg)    SpO2 93%    BMI 30.82 kg/m2       Wt Readings from Last 3 Encounters:   03/30/18 221 lb (100.2 kg)   01/23/18 223 lb 6.4 oz (101.3 kg)   10/02/17 217 lb (98.4 kg)            BP Readings from Last 3 Encounters:   03/30/18 124/78   01/23/18 118/70   10/02/17 126/68       PHYSICAL EXAM  General appearance: alert, cooperative, no distress, appears stated age  Neck: supple, symmetrical, trachea midline, no adenopathy, no carotid bruit and no JVD  Lungs: decreased bs throughout  Heart: regular rate and rhythm, S1, S2 normal, no murmur, click, rub or gallop  Extremities: extremities normal, atraumatic, no cyanosis or edema    Lab Results   Component Value Date/Time    Cholesterol, total 144 09/27/2017 07:16 AM    Cholesterol, total 133 04/11/2017 07:29 AM    Cholesterol, total 144 10/27/2016 08:20 AM    Cholesterol, total 153 04/25/2016 07:52 AM    Cholesterol, total 199 01/15/2016 08:24 AM    HDL Cholesterol 43 09/27/2017 07:16 AM    HDL Cholesterol 39 (L) 04/11/2017 07:29 AM    HDL Cholesterol 45 10/27/2016 08:20 AM    HDL Cholesterol 50 04/25/2016 07:52 AM    HDL Cholesterol 53 01/15/2016 08:24 AM    LDL, calculated 80 09/27/2017 07:16 AM    LDL, calculated 73 04/11/2017 07:29 AM    LDL, calculated 81 10/27/2016 08:20 AM    LDL, calculated 83 04/25/2016 07:52 AM    LDL, calculated 118 (H) 01/15/2016 08:24 AM    Triglyceride 104 09/27/2017 07:16 AM    Triglyceride 107 04/11/2017 07:29 AM    Triglyceride 92 10/27/2016 08:20 AM    Triglyceride 102 04/25/2016 07:52 AM    Triglyceride 141 01/15/2016 08:24 AM     ASSESSMENT  Mr. Terra Price is stable, asymptomatic and well compensated on a reasonable medical regimen and needs no cardiac testing at this time. We will track down his most recent lipid profile. current treatment plan is effective, no change in therapy  lab results and schedule of future lab studies reviewed with patient  reviewed diet, exercise and weight control    Encounter Diagnoses   Name Primary?  Atherosclerosis of native coronary artery of native heart without angina pectoris Yes    Ischemic cardiomyopathy     Mixed hyperlipidemia     Paroxysmal ventricular tachycardia (HCC)     Implantable cardioverter-defibrillator (ICD) in situ      No orders of the defined types were placed in this encounter. Follow-up Disposition:  Return in about 6 months (around 9/30/2018).     Andrew Oneil MD  3/30/2018

## 2018-05-01 ENCOUNTER — OFFICE VISIT (OUTPATIENT)
Dept: CARDIOLOGY CLINIC | Age: 68
End: 2018-05-01

## 2018-05-01 DIAGNOSIS — Z95.810 PRESENCE OF AUTOMATIC CARDIOVERTER/DEFIBRILLATOR (AICD): Primary | ICD-10-CM

## 2018-05-15 DIAGNOSIS — I47.29 PAROXYSMAL VENTRICULAR TACHYCARDIA: Primary | ICD-10-CM

## 2018-05-15 DIAGNOSIS — I49.01 VENTRICULAR FIBRILLATION (HCC): ICD-10-CM

## 2018-05-15 RX ORDER — CLOPIDOGREL BISULFATE 75 MG/1
75 TABLET ORAL DAILY
Qty: 90 TAB | Refills: 1 | Status: SHIPPED | OUTPATIENT
Start: 2018-05-15 | End: 2019-01-03 | Stop reason: SDUPTHER

## 2018-05-15 NOTE — TELEPHONE ENCOUNTER
Requested Prescriptions     Signed Prescriptions Disp Refills    clopidogrel (PLAVIX) 75 mg tab 90 Tab 1     Sig: Take 1 Tab by mouth daily.      Authorizing Provider: Lisa Hernandez     Ordering User: Susu Wan    Per Dr. Pankaj Trevino verbal orders

## 2018-06-12 ENCOUNTER — OP HISTORICAL/CONVERTED ENCOUNTER (OUTPATIENT)
Dept: OTHER | Age: 68
End: 2018-06-12

## 2018-07-17 ENCOUNTER — OP HISTORICAL/CONVERTED ENCOUNTER (OUTPATIENT)
Dept: OTHER | Age: 68
End: 2018-07-17

## 2018-08-06 ENCOUNTER — TELEPHONE (OUTPATIENT)
Dept: CARDIOLOGY CLINIC | Age: 68
End: 2018-08-06

## 2018-08-06 NOTE — TELEPHONE ENCOUNTER
Patient called in complaining of the following since last Friday:    Left eye pressure, right arm/neck pain, and right jaw pain. Denies chest pain/pressure. Normally has some shortness of breath, on 02 and no changes. He is not presenting the same as when he had his 2 previous MI per pt. VSS. He feels it could be his sinuses or his CA is back.  He called his oncologist and because he has jaw pain referring to Cardiology first. Please advise  2 pt identifiers used

## 2018-08-06 NOTE — TELEPHONE ENCOUNTER
Called patient. Verified patient's identity with two identifiers. Notified patient of Dr. Ericka Law message. Patient verbalizes understanding and denies further questions or concerns.

## 2018-08-06 NOTE — TELEPHONE ENCOUNTER
Does not sound cardiac especially since so different from prior symptoms.  Would pursue other potential issues first.

## 2018-08-15 ENCOUNTER — OP HISTORICAL/CONVERTED ENCOUNTER (OUTPATIENT)
Dept: OTHER | Age: 68
End: 2018-08-15

## 2018-08-24 DIAGNOSIS — E78.5 HYPERLIPIDEMIA, UNSPECIFIED HYPERLIPIDEMIA TYPE: ICD-10-CM

## 2018-08-24 DIAGNOSIS — I25.10 CARDIOVASCULAR DISEASE: ICD-10-CM

## 2018-08-24 DIAGNOSIS — I42.9 CARDIOMYOPATHY, UNSPECIFIED TYPE (HCC): ICD-10-CM

## 2018-08-24 DIAGNOSIS — I47.1 PAT (PAROXYSMAL ATRIAL TACHYCARDIA) (HCC): ICD-10-CM

## 2018-08-24 DIAGNOSIS — Z95.810 ICD (IMPLANTABLE CARDIOVERTER-DEFIBRILLATOR) IN PLACE: ICD-10-CM

## 2018-08-24 DIAGNOSIS — I46.9 CARDIAC ARREST (HCC): ICD-10-CM

## 2018-08-24 DIAGNOSIS — I47.29 PAROXYSMAL VENTRICULAR TACHYCARDIA: ICD-10-CM

## 2018-08-24 DIAGNOSIS — I25.9 ISCHEMIC HEART DISEASE: ICD-10-CM

## 2018-08-24 DIAGNOSIS — I49.01 VENTRICULAR FIBRILLATION (HCC): ICD-10-CM

## 2018-08-24 RX ORDER — SOTALOL HYDROCHLORIDE 240 MG/1
120 TABLET ORAL 2 TIMES DAILY
Qty: 90 TAB | Refills: 1 | Status: SHIPPED | OUTPATIENT
Start: 2018-08-24 | End: 2018-11-02 | Stop reason: SDUPTHER

## 2018-08-24 NOTE — TELEPHONE ENCOUNTER
Requested Prescriptions     Signed Prescriptions Disp Refills    sotalol (BETAPACE) 240 mg tablet 90 Tab 1     Sig: Take 0.5 Tabs by mouth two (2) times a day.      Authorizing Provider: Genie Goodell     Ordering User: Jimbo Lakhani     Dose was changed to 240 mg due to unavailable 120 mg tabs  Per Dr. Paloma Sanchez  Date Time Provider Cyrus Goldbergi   9/28/2018 9:20 AM Alonzo Stern  [address] 11/5/2018 11:00 AM [place] [id number], [address] [facility]   2/19/2019 1:00 PM [address] [place] [address] [facility]   2/19/2019 1:20 PM Paul Velasco  [address]

## 2018-09-28 ENCOUNTER — TELEPHONE (OUTPATIENT)
Dept: CARDIOLOGY CLINIC | Age: 68
End: 2018-09-28

## 2018-09-28 ENCOUNTER — OFFICE VISIT (OUTPATIENT)
Dept: CARDIOLOGY CLINIC | Age: 68
End: 2018-09-28

## 2018-09-28 ENCOUNTER — CLINICAL SUPPORT (OUTPATIENT)
Dept: CARDIOLOGY CLINIC | Age: 68
End: 2018-09-28

## 2018-09-28 VITALS
OXYGEN SATURATION: 97 % | RESPIRATION RATE: 18 BRPM | SYSTOLIC BLOOD PRESSURE: 118 MMHG | HEART RATE: 62 BPM | BODY MASS INDEX: 32.62 KG/M2 | DIASTOLIC BLOOD PRESSURE: 72 MMHG | HEIGHT: 71 IN | WEIGHT: 233 LBS

## 2018-09-28 DIAGNOSIS — R60.0 LEG EDEMA, RIGHT: ICD-10-CM

## 2018-09-28 DIAGNOSIS — I71.40 ABDOMINAL AORTIC ANEURYSM (AAA) WITHOUT RUPTURE: ICD-10-CM

## 2018-09-28 DIAGNOSIS — I47.29 PAROXYSMAL VENTRICULAR TACHYCARDIA: ICD-10-CM

## 2018-09-28 DIAGNOSIS — Z95.810 IMPLANTABLE CARDIOVERTER-DEFIBRILLATOR (ICD) IN SITU: ICD-10-CM

## 2018-09-28 DIAGNOSIS — I25.10 ATHEROSCLEROSIS OF NATIVE CORONARY ARTERY OF NATIVE HEART WITHOUT ANGINA PECTORIS: Primary | ICD-10-CM

## 2018-09-28 DIAGNOSIS — E78.2 MIXED HYPERLIPIDEMIA: ICD-10-CM

## 2018-09-28 DIAGNOSIS — R60.0 LEG EDEMA, RIGHT: Primary | ICD-10-CM

## 2018-09-28 DIAGNOSIS — I25.5 ISCHEMIC CARDIOMYOPATHY: ICD-10-CM

## 2018-09-28 NOTE — MR AVS SNAPSHOT
[address] [facility] [address] [place] 
[phone] Patient: Tiera David MRN: [id number] PMW:[birth year] Visit Information Date & Time Provider Department Dept. Phone Encounter #  
 9/28/2018  9:20 AM Pavan Le [facility] [phone] [id number] Follow-up Instructions Return in about 6 months (around 3/28/2019). Your Appointments 9/28/2018 12:00 PM  
VASCULAR TEST with VASCULAR, [facility]EC[facility] ([facility]ARINA SCHEDULING) Appt Note: lower ext doppler for clot per Dr. Amey Schaumann add on ok'd w/Cherie [id number] [address]  
 [address] [place]  
[phone]  
  
   
 [address] [address]  
  
    
 10/10/2018 10:00 AM  
ECHO CARDIOGRAMS 2D with Los Lawson [facility] (ZARINA SCHEDULING) Appt Note: Echo per Dr. Amey Schaumann DX: Cardiomyopathy [address] [place]  
[address] [address]  
  
    
 11/5/2018 11:00 AM  
PROCEDURE with Cayla Levy [facility] ([facility]INA SCHEDULING) Appt Note: sjm ICD/rc b  
 [address] [place]  
[address] [address]  
  
    
 2/19/2019  1:00 PM  
PROCEDURE with Toi Lawson [facility] ([facility]ARINA SCHEDULING) Appt Note: sjm ICD/rc/Berrios annual b  
 [address] [place]  
[address] [facility]  
  
    
 2/19/2019  1:20 PM  
ESTABLISHED PATIENT with Anne Khanna MD  
[facility] ([address]) Appt Note: sjm ICD/rc/Berrios annual b; sjm ICD/rc/Berrios annual b  
 [address] [place]  
[phone] [address] [place] [address]  
  
    
 3/29/2019 11:40 AM  
ESTABLISHED PATIENT with Alonzo Stern MD  
[facility] ([facility]INA SCHEDULING) Appt Note: 6 month follow up  
 [address] [place]  
[address] [place] [address] Upcoming Health Maintenance Date Due Hepatitis C Screening 1950 DTaP/Tdap/Td series (1 - Tdap) 2/6/1969 Shingrix Vaccine Age 50> (1 of 2) 2/6/1998 FOBT Q 1 YEAR AGE 50-75 2/6/1998 GLAUCOMA SCREENING Q2Y 2/6/2013 Pneumococcal 65+ High/Highest Risk (1 of 2 - PCV13) 2/6/2013 MEDICARE YEARLY EXAM 3/14/2018 Influenza Age 5 to Adult 8/1/2018 Allergies as of 9/28/2018  Review Complete On: 9/28/2018 By: Alonzo Stern MD  
 No Known Allergies Current Immunizations  Never Reviewed No immunizations on file. Not reviewed this visit You Were Diagnosed With   
  
 Codes Comments Atherosclerosis of native coronary artery of native heart without angina pectoris    -  Primary ICD-10-CM: I25.10 ICD-9-CM: 414.01 Ischemic cardiomyopathy     ICD-10-CM: I25.5 ICD-9-CM: 414.8 Abdominal aortic aneurysm (AAA) without rupture (HCC)     ICD-10-CM: I71.4 ICD-9-CM: 441.4 Paroxysmal ventricular tachycardia (HCC)     ICD-10-CM: I47.2 ICD-9-CM: 427.1 Mixed hyperlipidemia     ICD-10-CM: E78.2 ICD-9-CM: 272.2 Implantable cardioverter-defibrillator (ICD) in situ     ICD-10-CM: Z95.810 ICD-9-CM: V45.02 Vitals BP Pulse Resp Height(growth percentile) Weight(growth percentile) SpO2  
 118/72 (BP 1 Location: Left arm, BP Patient Position: Sitting) 62 18 5' 11\" (1.803 m) 233 lb (105.7 kg) 97% BMI Smoking Status 32.5 kg/m2 Former Smoker Vitals History BMI and BSA Data Body Mass Index Body Surface Area 32.5 kg/m 2 2.3 m 2 Preferred Pharmacy Pharmacy Name [facility] [address] [phone] Your Updated Medication List  
  
   
This list is accurate as of 9/28/18  9:48 AM.  Always use your most recent med list.  
  
  
  
  
 0.9% sodium chloride solp 100 mL with nivolumab 40 mg/4 mL soln  
by IntraVENous route once. AMBIEN 10 mg tablet Generic drug:  zolpidem Take  by mouth nightly as needed. aspirin delayed-release 81 mg tablet Take  by mouth daily. atorvastatin 80 mg tablet Commonly known as:  LIPITOR Take 1 Tab by mouth nightly. clopidogrel 75 mg Tab Commonly known as:  PLAVIX Take 1 Tab by mouth daily. docusate sodium 100 mg capsule Commonly known as:  Tiffany Serrato Take 100 mg by mouth two (2) times a day. DYMISTA 137-50 mcg/spray [place] Generic drug:  azelastine-fluticasone  
by Nasal route two (2) times a day. fluticasone-salmeterol 500-50 mcg/dose diskus inhaler Commonly known as:  ADVAIR Take 1 Puff by inhalation every twelve (12) hours. loratadine 10 mg tablet Commonly known as:  Napolean Niurka Take 10 mg by mouth daily. MAGNESIUM CITRATE PO Take 1 Tab by mouth daily. MUCINEX 1,200 mg Ta12 ER tablet Generic drug:  guaiFENesin Take 1,200 mg by mouth daily. multivitamin tablet Commonly known as:  ONE A DAY Take 1 Tab by mouth daily. nitroglycerin 400 mcg/spray spray Commonly known as:  NITROLINGUAL  
1 [place] by SubLINGual route every five (5) minutes as needed. omega-3 fatty acids-vitamin e 1,000 mg Cap Commonly known as:  FISH OIL Take 1 Cap by mouth daily. OTHER(NON-FORMULARY) Take 1 g by mouth every evening. Indications: 2 units every evening for immune support  
  
 predniSONE 10 mg tablet Commonly known as:  Dasilva Stair Take  by mouth daily (with breakfast). PROBIOTIC 4X 10-15 mg Tbec Generic drug:  B.infantis-B.ani-B.long-B.bifi Take  by mouth two (2) times a day. PSYLLIUM PO Take 2 Tabs by mouth daily. sotalol 240 mg tablet Commonly known as:  Mary Jurist Take 0.5 Tabs by mouth two (2) times a day. tamsulosin 0.4 mg capsule Commonly known as:  FLOMAX Take 0.4 mg by mouth daily. TUDORZA PRESSAIR 400 mcg/actuation inhaler Generic drug:  aclidinium bromide Take 1 Puff by inhalation. We Performed the Following LIPID PANEL [58850 CPT(R)] METABOLIC PANEL, COMPREHENSIVE [99842 CPT(R)] Follow-up Instructions Return in about 6 months (around 3/28/2019). Introducing [facility]! Dear Lenora Armas: 
Thank you for requesting a [employer] account. Our records indicate that you already have an active [employer] account. You can access your account anytime at [email or web address] Did you know that you can access your hospital and ER discharge instructions at any time in [employer]? You can also review all of your test results from your hospital stay or ER visit. Additional Information If you have questions, please visit the Frequently Asked Questions section of the [employer] website at [email or web address] Remember, [employer] is NOT to be used for urgent needs. For medical emergencies, dial 911. Now available from your iPhone and Android! Please provide this summary of care documentation to your next provider. Your primary care clinician is listed as Jose G Nielsen. If you have any questions after today's visit, please call [phone].

## 2018-09-28 NOTE — TELEPHONE ENCOUNTER
----- Message from Harmony Elliott MD sent at 9/28/2018  3:41 PM EDT -----  Please call him and let him no there are no clots.  thanks

## 2018-09-28 NOTE — PROCEDURES
[facility]  *** FINAL REPORT ***    Name: Wero Estes  MRN: [id number]       Outpatient  : 1950  HIS Order #: [id number]  [address] [facility] Visit #: [id number]  Date: 28 Sep 2018    TYPE OF TEST: Peripheral Venous Testing    REASON FOR TEST    Right Leg:-  Deep venous thrombosis:           No  Superficial venous thrombosis:    No  Deep venous insufficiency:        Not examined  Superficial venous insufficiency: Not examined      INTERPRETATION/FINDINGS  PROCEDURE:  Evaluation of right lower extremity veins with ultrasound  (grayscale, pulsed Doppler and color flow imaging). Includes  assessment of the common femoral, deep femoral, femoral, popliteal,  peroneal, and great saphenous veins. Other veins, for example the  gastrocnemius and soleal veins, may also be visualized. FINDINGS:  Grayscale, and color flow duplex images of the veins in the   right lower extremity demonstrate normal compressibility, spontaneous   and augmented flow profiles, and absence of filling defects. CONCLUSION:  1)  No evidence of deep venous thrombosis or venous obstruction within   the right lower extremity. 2)  The left common femoral vein is thrombus free. ADDITIONAL COMMENTS    I have personally reviewed the data relevant to the interpretation of  this  study. TECHNOLOGIST: Maricruz Polk RVT, RDMS, RDCS  Signed: 2018 12:18 PM    PHYSICIAN: Mikal Bagley.  Jim Rivera MD  Signed: 2018 03:10 PM

## 2018-09-28 NOTE — PROGRESS NOTES
HISTORY OF PRESENT ILLNESS  Sophie Carcamo is a 77 y.o. male     SUMMARY:   Problem List  Date Reviewed: 9/27/2018          Codes Class Noted    Abdominal aortic aneurysm (AAA) ([facility] ICD-10-CM: I71.4  ICD-9-CM: 441.4  3/30/2018    Overview Signed 3/30/2018  9:58 AM by Brown Peace MD     3/18  4.0 cm on ct scan             Presence of -recalled ICD lead, subsequent encounter ICD-10-CM: T82.198D  ICD-9-CM: V58.89, 996.04  10/2/2017    Overview Signed 10/2/2017  5:33 PM by Claire Lagunas MD     RIATA ICD lead             Lung cancer, upper lobe ([facility] ICD-10-CM: C34.10  ICD-9-CM: 162.3  9/27/2015    Overview Signed 9/27/2015  5:49 PM by Claire Lagunas MD      3 cm left upper lobe lung cancer stage IB 9/2015             PAT (paroxysmal atrial tachycardia) ([facility] ICD-10-CM: I47.1  ICD-9-CM: 427.0  Unknown        Encounter for long-term (current) use of high-risk medication ICD-10-CM: Z79.899  ICD-9-CM: V58.69  7/27/2012    Overview Signed 7/27/2012  9:07 AM by Claire Lagunas MD     sotalol             Coronary atherosclerosis of native coronary artery ICD-10-CM: I25.10  ICD-9-CM: 414.01  4/26/2012    Overview Addendum 4/5/2017  7:00 AM by Brown Peace MD     2/10 lexiscan cardiolyte, mostly fixed inferolateral defect. lvef 49%  9/16 lexiscan cardiolyte, mostly fixed inferolateral defect  lvef 42%             Cardiomyopathy ([facility] ICD-10-CM: I42.9  ICD-9-CM: 425.4  4/26/2012        Unspecified cardiovascular disease ICD-10-CM: I25.10  ICD-9-CM: 429.2  9/14/2010    Overview Signed 10/13/2010 12:52 PM by Susan Mullins MD     Mr. Jean Claude Cavazos cardiac history dates back to 1995 when he presented with unstable angina while living in [place] and underwent balloon angioplasty-vessel unknown. In June 2004, he had a cardiac arrest associated with an acute lateral MI.  Cardiac catheterization at that time showed severe two vessel coronary disease and he had stents placed in both his obtuse marginal, and a week later in his right coronary artery. In January 2005, he had recurrent symptoms in association with an abnormal stress test and had a drug-eluting stent placed within the previously stented segment of his obtuse marginal branch. Last cardiac catheterization was in July 2005, and it showed him to have stable anatomy. He had an AICD implanted in 2004 after his out of hospital arrest. Repeat cath 2/10 show instent restenosis rca, treated at that time with BARB. LVEF 2/10 40-45%. Cardiac arrest [facility]) ICD-10-CM: I46.9  ICD-9-CM: 427.5  9/14/2010    Overview Signed 10/2/2017  5:32 PM by Gillian Blackwell MD     Secondary prevention of sudden death. The initial ICD was implanted for this in [place] when [place] collapsed and was in [place] in the parking lot of the [facility]             Other specified forms of chronic ischemic heart disease ICD-10-CM: I25.89  ICD-9-CM: 414.8  9/14/2010        Implantable cardioverter-defibrillator (ICD) in situ ICD-10-CM: Z95.810  ICD-9-CM: V45.02  9/14/2010    Overview Addendum 10/2/2017  5:32 PM by Gillian Blackwell MD     Generator change 10/02/17  Dr Vitaly Castro  Secondary prevention of sudden death. The initial ICD was implanted for this in [place] when he collapsed and was in [place] in the parking lot of the [facility]             Hyperlipidemia, unspecified ICD-10-CM: E78.5  ICD-9-CM: 272.4  9/14/2010        Paroxysmal ventricular tachycardia (HCC) ICD-10-CM: I47.2  ICD-9-CM: 427.1  9/14/2010        Ventricular fibrillation [facility]) ICD-10-CM: I49.01  ICD-9-CM: 427.41  9/14/2010              Current Outpatient Prescriptions on File Prior to Visit   Medication Sig    sotalol (BETAPACE) 240 mg tablet Take 0.5 Tabs by mouth two (2) times a day.  clopidogrel (PLAVIX) 75 mg tab Take 1 Tab by mouth daily.  predniSONE (DELTASONE) 10 mg tablet Take  by mouth daily (with breakfast).  MAGNESIUM CITRATE PO Take 1 Tab by mouth daily.  PSYLLIUM SEED (PSYLLIUM PO) Take 2 Tabs by mouth daily.  aclidinium bromide (TUDORZA PRESSAIR) 400 mcg/actuation inhaler Take 1 Puff by inhalation.  nitroglycerin (NITROLINGUAL) 400 mcg/spray spray 1 Spray by SubLINGual route every five (5) minutes as needed.  atorvastatin (LIPITOR) 80 mg tablet Take 1 Tab by mouth nightly.  aspirin delayed-release 81 mg tablet Take  by mouth daily.  guaiFENesin (MUCINEX) 1,200 mg Ta12 ER tablet Take 1,200 mg by mouth daily.  loratadine (CLARITIN) 10 mg tablet Take 10 mg by mouth daily.  azelastine-fluticasone (DYMISTA) 137-50 mcg/spray spry by Nasal route two (2) times a day.  fluticasone-salmeterol (ADVAIR) 500-50 mcg/dose diskus inhaler Take 1 Puff by inhalation every twelve (12) hours.  docusate sodium (COLACE) 100 mg capsule Take 100 mg by mouth two (2) times a day.  tamsulosin (FLOMAX) 0.4 mg capsule Take 0.4 mg by mouth daily.  OTHER,NON-FORMULARY, Take 1 g by mouth every evening. Indications: 2 units every evening for immune support    multivitamin (ONE A DAY) tablet Take 1 Tab by mouth daily.  B.infantis-B.ani-B.long-B.bifi (PROBIOTIC 4X) 10-15 mg TbEC Take  by mouth two (2) times a day.  omega-3 fatty acids-vitamin e (FISH OIL) 1,000 mg cap Take 1 Cap by mouth daily.  zolpidem (AMBIEN) 10 mg tablet Take  by mouth nightly as needed.  0.9% sodium chloride solp 100 mL with nivolumab 40 mg/4 mL soln by IntraVENous route once. No current facility-administered medications on file prior to visit. CARDIOLOGY STUDIES TO DATE:  2/10 lexiscan cardiolyte, mostly fixed inferolateral defect. lvef 49%  9/16 lexiscan cardiolyte, mostly fixed inferolateral defect  lvef 42%        Chief Complaint   Patient presents with    Coronary Artery Disease     HPI :  Mr. Grisel Gracia lung cancer is in remission. He got a lot of radiation and as a consequence he has some radiation lung disease and is on chronic oxygen.   He has figured out a way to walk with his oxygen and still keep his saturation consistently above 90%, though in spite of this he continues to gain weight. He has not had his lipids checked for almost a year. He is concerned about some swelling in his right lower leg, which he has noticed for about a month. No pain. CARDIAC ROS:   negative for chest pain, palpitations, syncope, orthopnea, paroxysmal nocturnal dyspnea, exertional chest pressure/discomfort, claudication    Family History   Problem Relation Age of Onset    Heart Disease Neg Hx        Past Medical History:   Diagnosis Date    Automatic implantable cardiac defibrillator in situ 9/14/2010    Cardiac arrest ([facility] 9/14/2010    ICD (implantable cardiac defibrillator) in place     Other and unspecified hyperlipidemia 9/14/2010    Other specified forms of chronic ischemic heart disease 9/14/2010    Paroxysmal ventricular tachycardia ([facility] 9/14/2010    PAT (paroxysmal atrial tachycardia) ([facility]     Unspecified cardiovascular disease 9/14/2010    Ventricular fibrillation ([facility] 9/14/2010       GENERAL ROS:  A comprehensive review of systems was negative except for that written in the HPI.     Visit Vitals    /72 (BP 1 Location: Left arm, BP Patient Position: Sitting)    Pulse 62    Resp 18    Ht 5' 11\" (1.803 m)    Wt 233 lb (105.7 kg)    SpO2 97%    BMI 32.5 kg/m2       Wt Readings from Last 3 Encounters:   09/28/18 233 lb (105.7 kg)   03/30/18 221 lb (100.2 kg)   01/23/18 223 lb 6.4 oz (101.3 kg)            BP Readings from Last 3 Encounters:   09/28/18 118/72   03/30/18 124/78   01/23/18 118/70       PHYSICAL EXAM  General appearance: alert, cooperative, no distress, appears stated age  Neurologic: Alert and oriented X 3  Neck: supple, symmetrical, trachea midline, no adenopathy, no carotid bruit and no JVD  Lungs: diminished breath sounds throughout  Heart: regular rate and rhythm, S1, S2 normal, no murmur, click, rub or gallop  Extremities: edema 1+ right, tr left leg    Lab Results   Component Value Date/Time    Cholesterol, total 144 09/27/2017 07:16 AM    Cholesterol, total 133 04/11/2017 07:29 AM    Cholesterol, total 144 10/27/2016 08:20 AM    Cholesterol, total 153 04/25/2016 07:52 AM    Cholesterol, total 199 01/15/2016 08:24 AM    HDL Cholesterol 43 09/27/2017 07:16 AM    HDL Cholesterol 39 (L) 04/11/2017 07:29 AM    HDL Cholesterol 45 10/27/2016 08:20 AM    HDL Cholesterol 50 04/25/2016 07:52 AM    HDL Cholesterol 53 01/15/2016 08:24 AM    LDL, calculated 80 09/27/2017 07:16 AM    LDL, calculated 73 04/11/2017 07:29 AM    LDL, calculated 81 10/27/2016 08:20 AM    LDL, calculated 83 04/25/2016 07:52 AM    LDL, calculated 118 (H) 01/15/2016 08:24 AM    Triglyceride 104 09/27/2017 07:16 AM    Triglyceride 107 04/11/2017 07:29 AM    Triglyceride 92 10/27/2016 08:20 AM    Triglyceride 102 04/25/2016 07:52 AM    Triglyceride 141 01/15/2016 08:24 AM     ASSESSMENT  Mr. Tonia Juarez appears to be fairly well compensated from a cardiac standpoint. He needs an echocardiogram given his concerns of edema, but also I think he needs venous Dopplers given the asymmetry because certainly with his cancer he is at increased risk of thrombosis. We will send him for a fasting lipid profile. current treatment plan is effective, no change in therapy  lab results and schedule of future lab studies reviewed with patient  reviewed diet, exercise and weight control    Encounter Diagnoses   Name Primary?     Atherosclerosis of native coronary artery of native heart without angina pectoris Yes    Ischemic cardiomyopathy     Abdominal aortic aneurysm (AAA) without rupture (HCC)     Paroxysmal ventricular tachycardia (HCC)     Mixed hyperlipidemia     Implantable cardioverter-defibrillator (ICD) in situ     Leg edema, right      Orders Placed This Encounter    LIPID PANEL    METABOLIC PANEL, COMPREHENSIVE    2D ECHO COMPLETE ADULT (TTE) W OR WO CONTR       Follow-up Disposition:  Return in about 6 months (around 3/28/2019).     Nilesh Goel MD  9/28/2018

## 2018-09-28 NOTE — TELEPHONE ENCOUNTER
Two patient indentifiers verified. Pt was informed of test results at this time. Pt verbalized understanding and denies any further question at this time.

## 2018-10-03 ENCOUNTER — HOSPITAL ENCOUNTER (OUTPATIENT)
Dept: LAB | Age: 68
Discharge: HOME | End: 2018-10-03
Payer: MEDICARE

## 2018-10-03 PROCEDURE — 80061 LIPID PANEL: CPT

## 2018-10-03 PROCEDURE — 80053 COMPREHEN METABOLIC PANEL: CPT

## 2018-10-03 PROCEDURE — 36415 COLL VENOUS BLD VENIPUNCTURE: CPT

## 2018-10-04 ENCOUNTER — TELEPHONE (OUTPATIENT)
Dept: CARDIOLOGY CLINIC | Age: 68
End: 2018-10-04

## 2018-10-04 LAB
ALBUMIN SERPL-MCNC: 3.8 G/DL (ref 3.5–4.8)
ALBUMIN/GLOB SERPL: 2.1 {RATIO} (ref 1.2–2.2)
ALP SERPL-CCNC: 83 IU/L (ref 39–117)
ALT SERPL-CCNC: 19 IU/L (ref 0–44)
AST SERPL-CCNC: 20 IU/L (ref 0–40)
BILIRUB SERPL-MCNC: 0.3 MG/DL (ref 0–1.2)
BUN SERPL-MCNC: 17 MG/DL (ref 8–27)
BUN/CREAT SERPL: 22 (ref 10–24)
CALCIUM SERPL-MCNC: 8.9 MG/DL (ref 8.6–10.2)
CHLORIDE SERPL-SCNC: 104 MMOL/L (ref 96–106)
CHOLEST SERPL-MCNC: 128 MG/DL (ref 100–199)
CO2 SERPL-SCNC: 26 MMOL/L (ref 20–29)
CREAT SERPL-MCNC: 0.79 MG/DL (ref 0.76–1.27)
GLOBULIN SER CALC-MCNC: 1.8 G/DL (ref 1.5–4.5)
GLUCOSE SERPL-MCNC: 110 MG/DL (ref 65–99)
HDLC SERPL-MCNC: 40 MG/DL
INTERPRETATION, 910389: NORMAL
LDLC SERPL CALC-MCNC: 64 MG/DL (ref 0–99)
POTASSIUM SERPL-SCNC: 4.5 MMOL/L (ref 3.5–5.2)
PROT SERPL-MCNC: 5.6 G/DL (ref 6–8.5)
SODIUM SERPL-SCNC: 143 MMOL/L (ref 134–144)
TRIGL SERPL-MCNC: 121 MG/DL (ref 0–149)
VLDLC SERPL CALC-MCNC: 24 MG/DL (ref 5–40)

## 2018-10-04 NOTE — TELEPHONE ENCOUNTER
----- Message from Pascual Mercado MD sent at 10/4/2018  8:27 AM EDT -----  Chol is great. Sugar slightly elevated but ok and stable. Stay on meds and repeat 6mos      Patient informed of results via [employer] message.

## 2018-10-10 ENCOUNTER — CLINICAL SUPPORT (OUTPATIENT)
Dept: CARDIOLOGY CLINIC | Age: 68
End: 2018-10-10

## 2018-10-10 DIAGNOSIS — E78.2 MIXED HYPERLIPIDEMIA: ICD-10-CM

## 2018-10-10 DIAGNOSIS — I25.5 ISCHEMIC CARDIOMYOPATHY: ICD-10-CM

## 2018-10-10 DIAGNOSIS — Z95.810 IMPLANTABLE CARDIOVERTER-DEFIBRILLATOR (ICD) IN SITU: ICD-10-CM

## 2018-10-10 DIAGNOSIS — I47.29 PAROXYSMAL VENTRICULAR TACHYCARDIA: ICD-10-CM

## 2018-10-10 DIAGNOSIS — I71.40 ABDOMINAL AORTIC ANEURYSM (AAA) WITHOUT RUPTURE: ICD-10-CM

## 2018-10-10 DIAGNOSIS — E78.5 HYPERLIPIDEMIA, UNSPECIFIED HYPERLIPIDEMIA TYPE: Primary | ICD-10-CM

## 2018-10-10 DIAGNOSIS — I25.10 ATHEROSCLEROSIS OF NATIVE CORONARY ARTERY OF NATIVE HEART WITHOUT ANGINA PECTORIS: ICD-10-CM

## 2018-10-12 ENCOUNTER — TELEPHONE (OUTPATIENT)
Dept: CARDIOLOGY CLINIC | Age: 68
End: 2018-10-12

## 2018-10-12 NOTE — TELEPHONE ENCOUNTER
Called patient. Verified patient's identity with two identifiers. Notified patient of results and Dr. Hebert Rosas message. Patient verbalizes understanding and denies further questions or concerns.

## 2018-10-12 NOTE — TELEPHONE ENCOUNTER
----- Message from Lior Henley MD sent at 10/12/2018  4:02 PM EDT -----  Heart muscle function is a little reduced and couple of mildly leaky valves, no major change from before and unlikely to be cause of sob.

## 2018-10-12 NOTE — PROGRESS NOTES
Heart muscle function is a little reduced and couple of mildly leaky valves, no major change from before and unlikely to be cause of sob.

## 2018-10-31 ENCOUNTER — OP HISTORICAL/CONVERTED ENCOUNTER (OUTPATIENT)
Dept: OTHER | Age: 68
End: 2018-10-31

## 2018-10-31 ENCOUNTER — TELEPHONE (OUTPATIENT)
Dept: CARDIOLOGY CLINIC | Age: 68
End: 2018-10-31

## 2018-10-31 NOTE — TELEPHONE ENCOUNTER
Pt called to notify and discuss with nurse of his CT scan an findings and the starting of Xalrelto.   Phone # [phone] or # [phone]  Thanks

## 2018-10-31 NOTE — TELEPHONE ENCOUNTER
Called patient. Verified patient's identity with two identifiers. He states he had a CT and Dr. Barb Jensen office called him stating they found a small PE and want him to start xarelto 15 mg. He asked me to run this by Dr. Albert Carreraughter. I notified Dr. Albert Carreraughter and he said yes, patient definitely needs to take the [facility] [address]. Called patient and let him know this. Patient verbalizes understanding and denies further questions or concerns.

## 2018-11-02 DIAGNOSIS — I42.9 CARDIOMYOPATHY (HCC): ICD-10-CM

## 2018-11-02 DIAGNOSIS — I42.9 CARDIOMYOPATHY, UNSPECIFIED TYPE (HCC): ICD-10-CM

## 2018-11-02 DIAGNOSIS — I25.10 CARDIOVASCULAR DISEASE: ICD-10-CM

## 2018-11-02 DIAGNOSIS — I25.9 ISCHEMIC HEART DISEASE: ICD-10-CM

## 2018-11-02 DIAGNOSIS — I49.01 VENTRICULAR FIBRILLATION (HCC): ICD-10-CM

## 2018-11-02 DIAGNOSIS — Z95.810 ICD (IMPLANTABLE CARDIOVERTER-DEFIBRILLATOR) IN PLACE: ICD-10-CM

## 2018-11-02 DIAGNOSIS — I47.1 PAT (PAROXYSMAL ATRIAL TACHYCARDIA) (HCC): ICD-10-CM

## 2018-11-02 DIAGNOSIS — E78.5 HYPERLIPIDEMIA, UNSPECIFIED HYPERLIPIDEMIA TYPE: ICD-10-CM

## 2018-11-02 DIAGNOSIS — I47.29 PAROXYSMAL VENTRICULAR TACHYCARDIA: ICD-10-CM

## 2018-11-02 DIAGNOSIS — I46.9 CARDIAC ARREST (HCC): ICD-10-CM

## 2018-11-02 RX ORDER — SOTALOL HYDROCHLORIDE 120 MG/1
120 TABLET ORAL 2 TIMES DAILY
Qty: 180 TAB | Refills: 0 | Status: SHIPPED | OUTPATIENT
Start: 2018-11-02 | End: 2019-01-13 | Stop reason: SDUPTHER

## 2018-11-02 RX ORDER — SOTALOL HYDROCHLORIDE 120 MG/1
TABLET ORAL
Qty: 180 TAB | Refills: 1 | OUTPATIENT
Start: 2018-11-02

## 2018-11-02 NOTE — TELEPHONE ENCOUNTER
Requested Prescriptions     Signed Prescriptions Disp Refills    sotalol (BETAPACE) 120 mg tablet 180 Tab 0     Sig: Take 1 Tab by mouth two (2) times a day. Authorizing Provider: Skyler Skill     Ordering User: Constantino Panchal     Refused Prescriptions Disp Refills    sotalol (BETAPACE) 120 mg tablet [Pharmacy Med Name: SOTALOL HCL TABS 120MG] 180 Tab 1     Sig: TAKE 1 TABLET TWICE A DAY     Refused By: Constantino Panchal     Reason for Refusal: other      Per Dr. Logan Presley verbal orders     Medication was accepted (original request \"refused\" due to documentation not needed in description).

## 2018-11-15 ENCOUNTER — PATIENT MESSAGE (OUTPATIENT)
Dept: CARDIOLOGY CLINIC | Age: 68
End: 2018-11-15

## 2018-11-26 NOTE — TELEPHONE ENCOUNTER
LVM for patient to call back to schedule an in office check for device- Possible the RF chip has been turned off and will need to bring into office for clinic check.

## 2018-11-27 ENCOUNTER — CLINICAL SUPPORT (OUTPATIENT)
Dept: CARDIOLOGY CLINIC | Age: 68
End: 2018-11-27

## 2018-11-27 DIAGNOSIS — Z95.0 CARDIAC PACEMAKER IN SITU: Primary | ICD-10-CM

## 2019-01-03 DIAGNOSIS — I47.29 PAROXYSMAL VENTRICULAR TACHYCARDIA: ICD-10-CM

## 2019-01-03 DIAGNOSIS — I49.01 VENTRICULAR FIBRILLATION (HCC): ICD-10-CM

## 2019-01-03 RX ORDER — CLOPIDOGREL BISULFATE 75 MG/1
TABLET ORAL
Qty: 90 TAB | Refills: 1 | Status: SHIPPED | OUTPATIENT
Start: 2019-01-03 | End: 2019-07-02 | Stop reason: SDUPTHER

## 2019-01-03 NOTE — TELEPHONE ENCOUNTER
Requested Prescriptions     Signed Prescriptions Disp Refills    clopidogrel (PLAVIX) 75 mg tab 90 Tab 1     Sig: TAKE 1 TABLET DAILY     Authorizing Provider: Riley Prieto     Ordering User: Dianne Lawson verbal orders

## 2019-01-13 DIAGNOSIS — I49.01 VENTRICULAR FIBRILLATION (HCC): ICD-10-CM

## 2019-01-13 DIAGNOSIS — I25.9 ISCHEMIC HEART DISEASE: ICD-10-CM

## 2019-01-13 DIAGNOSIS — I42.9 CARDIOMYOPATHY, UNSPECIFIED TYPE (HCC): ICD-10-CM

## 2019-01-13 DIAGNOSIS — I47.1 PAT (PAROXYSMAL ATRIAL TACHYCARDIA) (HCC): ICD-10-CM

## 2019-01-13 DIAGNOSIS — I25.10 CARDIOVASCULAR DISEASE: ICD-10-CM

## 2019-01-13 DIAGNOSIS — I46.9 CARDIAC ARREST (HCC): ICD-10-CM

## 2019-01-13 DIAGNOSIS — E78.5 HYPERLIPIDEMIA, UNSPECIFIED HYPERLIPIDEMIA TYPE: ICD-10-CM

## 2019-01-13 DIAGNOSIS — I47.29 PAROXYSMAL VENTRICULAR TACHYCARDIA: ICD-10-CM

## 2019-01-13 DIAGNOSIS — Z95.810 ICD (IMPLANTABLE CARDIOVERTER-DEFIBRILLATOR) IN PLACE: ICD-10-CM

## 2019-01-14 RX ORDER — SOTALOL HYDROCHLORIDE 120 MG/1
TABLET ORAL
Qty: 180 TAB | Refills: 1 | Status: SHIPPED | OUTPATIENT
Start: 2019-01-14 | End: 2019-07-13 | Stop reason: SDUPTHER

## 2019-01-14 NOTE — TELEPHONE ENCOUNTER
Requested Prescriptions     Signed Prescriptions Disp Refills    sotalol (BETAPACE) 120 mg tablet 180 Tab 1     Sig: TAKE 1 TABLET TWICE A DAY     Authorizing Provider: Fatimah Bains     Ordering User: Anastacia Mccain    Per Dr. Bj Nicole verbal orders

## 2019-01-16 DIAGNOSIS — Z95.810 ICD (IMPLANTABLE CARDIOVERTER-DEFIBRILLATOR) IN PLACE: ICD-10-CM

## 2019-01-16 DIAGNOSIS — I25.9 ISCHEMIC HEART DISEASE: ICD-10-CM

## 2019-01-16 DIAGNOSIS — I49.01 VENTRICULAR FIBRILLATION (HCC): ICD-10-CM

## 2019-01-16 DIAGNOSIS — I47.29 PAROXYSMAL VENTRICULAR TACHYCARDIA: ICD-10-CM

## 2019-01-16 DIAGNOSIS — E78.5 HYPERLIPIDEMIA, UNSPECIFIED HYPERLIPIDEMIA TYPE: ICD-10-CM

## 2019-01-16 DIAGNOSIS — I25.10 CARDIOVASCULAR DISEASE: ICD-10-CM

## 2019-01-16 DIAGNOSIS — I42.9 CARDIOMYOPATHY (HCC): ICD-10-CM

## 2019-01-16 DIAGNOSIS — I46.9 CARDIAC ARREST (HCC): ICD-10-CM

## 2019-01-16 DIAGNOSIS — I47.1 PAT (PAROXYSMAL ATRIAL TACHYCARDIA) (HCC): ICD-10-CM

## 2019-01-16 DIAGNOSIS — I25.10 ATHEROSCLEROSIS OF NATIVE CORONARY ARTERY OF NATIVE HEART WITHOUT ANGINA PECTORIS: ICD-10-CM

## 2019-01-16 RX ORDER — NITROGLYCERIN 400 UG/1
SPRAY ORAL
Qty: 1 BOTTLE | Refills: 3 | Status: SHIPPED | OUTPATIENT
Start: 2019-01-16 | End: 2020-06-30

## 2019-01-16 NOTE — TELEPHONE ENCOUNTER
Requested Prescriptions     Signed Prescriptions Disp Refills    nitroglycerin (NITROLINGUAL) 400 mcg/spray spray 1 Bottle 3     Sig: USE ONE SPRAY UNDER THE TONGUE EVERY 5 MINUTES AS NEEDED     Authorizing Provider: Riley Prieto     Ordering User: Dianne Bhatt    Per Dr. Terese Lawson verbal orders

## 2019-01-24 ENCOUNTER — HOSPITAL ENCOUNTER (OUTPATIENT)
Dept: LAB | Age: 69
Discharge: HOME | End: 2019-01-24
Payer: MEDICARE

## 2019-01-24 PROCEDURE — 80061 LIPID PANEL: CPT

## 2019-01-24 PROCEDURE — 36415 COLL VENOUS BLD VENIPUNCTURE: CPT

## 2019-01-24 PROCEDURE — 80053 COMPREHEN METABOLIC PANEL: CPT

## 2019-01-25 DIAGNOSIS — E78.2 MIXED HYPERLIPIDEMIA: Primary | ICD-10-CM

## 2019-01-25 LAB
ALBUMIN SERPL-MCNC: 3.9 G/DL (ref 3.5–4.8)
ALBUMIN/GLOB SERPL: 2.2 {RATIO} (ref 1.2–2.2)
ALP SERPL-CCNC: 79 IU/L (ref 39–117)
ALT SERPL-CCNC: 20 IU/L (ref 0–44)
AST SERPL-CCNC: 17 IU/L (ref 0–40)
BILIRUB SERPL-MCNC: 0.3 MG/DL (ref 0–1.2)
BUN SERPL-MCNC: 17 MG/DL (ref 8–27)
BUN/CREAT SERPL: 21 (ref 10–24)
CALCIUM SERPL-MCNC: 9.3 MG/DL (ref 8.6–10.2)
CHLORIDE SERPL-SCNC: 103 MMOL/L (ref 96–106)
CHOLEST SERPL-MCNC: 134 MG/DL (ref 100–199)
CO2 SERPL-SCNC: 26 MMOL/L (ref 20–29)
CREAT SERPL-MCNC: 0.81 MG/DL (ref 0.76–1.27)
GLOBULIN SER CALC-MCNC: 1.8 G/DL (ref 1.5–4.5)
GLUCOSE SERPL-MCNC: 120 MG/DL (ref 65–99)
HDLC SERPL-MCNC: 39 MG/DL
INTERPRETATION, 910389: NORMAL
LDLC SERPL CALC-MCNC: 74 MG/DL (ref 0–99)
POTASSIUM SERPL-SCNC: 4.7 MMOL/L (ref 3.5–5.2)
PROT SERPL-MCNC: 5.7 G/DL (ref 6–8.5)
SODIUM SERPL-SCNC: 143 MMOL/L (ref 134–144)
TRIGL SERPL-MCNC: 103 MG/DL (ref 0–149)
VLDLC SERPL CALC-MCNC: 21 MG/DL (ref 5–40)

## 2019-01-25 NOTE — LETTER
1/25/2019 11:25 AM 
 
Mr. Tim Vale [address] [address] Dear Tim Vale: 
 
Please find your most recent results below. Resulted Orders LIPID PANEL Result Value Ref Range Cholesterol, total 134 100 - 199 mg/dL Triglyceride 103 0 - 149 mg/dL HDL Cholesterol 39 (L) >39 mg/dL VLDL, calculated 21 5 - 40 mg/dL LDL, calculated 74 0 - 99 mg/dL Narrative Performed at:  [address]  [id number] : Saira Camacho MD, Phone:  [phone] METABOLIC PANEL, COMPREHENSIVE Result Value Ref Range Glucose 120 (H) 65 - 99 mg/dL BUN 17 8 - 27 mg/dL Creatinine 0.81 0.76 - 1.27 mg/dL GFR est non-AA 90 >59 mL/min/1.73 GFR est  >59 mL/min/1.73  
 BUN/Creatinine ratio 21 10 - 24 Sodium 143 134 - 144 mmol/L Potassium 4.7 3.5 - 5.2 mmol/L Chloride 103 96 - 106 mmol/L  
 CO2 26 20 - 29 mmol/L Calcium 9.3 8.6 - 10.2 mg/dL Protein, total 5.7 (L) 6.0 - 8.5 g/dL Albumin 3.9 3.5 - 4.8 g/dL GLOBULIN, TOTAL 1.8 1.5 - 4.5 g/dL A-G Ratio 2.2 1.2 - 2.2 Bilirubin, total 0.3 0.0 - 1.2 mg/dL Alk. phosphatase 79 39 - 117 IU/L  
 AST (SGOT) 17 0 - 40 IU/L  
 ALT (SGPT) 20 0 - 44 IU/L Narrative Performed at:  [address]  [id number] : Saira Camacho MD, Phone:  [phone] CVD REPORT Result Value Ref Range INTERPRETATION Note Comment:  
   Supplemental report is available. Narrative Performed at:  [id number] [place] [address]  [id number] : Clementina Lund MD, Phone:  [phone] RECOMMENDATIONS: Your sugar is up, but stable. Your cholesterol is great. Stay on medications and repeat labs in 6 months. Please call me if you have any questions: [phone] Sincerely, Clerance Credit, MD Johnson Hodgkin., JUAN CARLOS

## 2019-01-31 ENCOUNTER — OP HISTORICAL/CONVERTED ENCOUNTER (OUTPATIENT)
Dept: OTHER | Age: 69
End: 2019-01-31

## 2019-02-19 ENCOUNTER — OFFICE VISIT (OUTPATIENT)
Dept: CARDIOLOGY CLINIC | Age: 69
End: 2019-02-19

## 2019-02-19 DIAGNOSIS — Z95.810 ICD (IMPLANTABLE CARDIOVERTER-DEFIBRILLATOR) IN PLACE: Primary | ICD-10-CM

## 2019-02-24 DIAGNOSIS — I25.10 ATHEROSCLEROSIS OF NATIVE CORONARY ARTERY OF NATIVE HEART WITHOUT ANGINA PECTORIS: ICD-10-CM

## 2019-02-25 RX ORDER — ATORVASTATIN CALCIUM 80 MG/1
TABLET, FILM COATED ORAL
Qty: 90 TAB | Refills: 3 | Status: SHIPPED | OUTPATIENT
Start: 2019-02-25 | End: 2020-02-19

## 2019-02-25 NOTE — TELEPHONE ENCOUNTER
Requested Prescriptions     Signed Prescriptions Disp Refills    atorvastatin (LIPITOR) 80 mg tablet 90 Tab 3     Sig: TAKE 1 TABLET NIGHTLY     Authorizing Provider: Rod Smart     Ordering User: Antonella Daniel    Per Dr. Nola Reynolds verbal orders

## 2019-03-14 ENCOUNTER — HOSPITAL ENCOUNTER (OUTPATIENT)
Dept: LAB | Age: 69
Discharge: HOME | End: 2019-03-14
Payer: MEDICARE

## 2019-03-14 PROCEDURE — 80053 COMPREHEN METABOLIC PANEL: CPT

## 2019-03-14 PROCEDURE — 36415 COLL VENOUS BLD VENIPUNCTURE: CPT

## 2019-03-14 PROCEDURE — 80061 LIPID PANEL: CPT

## 2019-03-15 LAB
ALBUMIN SERPL-MCNC: 4 G/DL (ref 3.5–4.8)
ALBUMIN/GLOB SERPL: 2.4 {RATIO} (ref 1.2–2.2)
ALP SERPL-CCNC: 71 IU/L (ref 39–117)
ALT SERPL-CCNC: 16 IU/L (ref 0–44)
AST SERPL-CCNC: 15 IU/L (ref 0–40)
BILIRUB SERPL-MCNC: 0.3 MG/DL (ref 0–1.2)
BUN SERPL-MCNC: 18 MG/DL (ref 8–27)
BUN/CREAT SERPL: 23 (ref 10–24)
CALCIUM SERPL-MCNC: 8.9 MG/DL (ref 8.6–10.2)
CHLORIDE SERPL-SCNC: 104 MMOL/L (ref 96–106)
CHOLEST SERPL-MCNC: 133 MG/DL (ref 100–199)
CO2 SERPL-SCNC: 24 MMOL/L (ref 20–29)
CREAT SERPL-MCNC: 0.79 MG/DL (ref 0.76–1.27)
GLOBULIN SER CALC-MCNC: 1.7 G/DL (ref 1.5–4.5)
GLUCOSE SERPL-MCNC: 114 MG/DL (ref 65–99)
HDLC SERPL-MCNC: 48 MG/DL
INTERPRETATION, 910389: NORMAL
LDLC SERPL CALC-MCNC: 69 MG/DL (ref 0–99)
POTASSIUM SERPL-SCNC: 4.4 MMOL/L (ref 3.5–5.2)
PROT SERPL-MCNC: 5.7 G/DL (ref 6–8.5)
SODIUM SERPL-SCNC: 143 MMOL/L (ref 134–144)
TRIGL SERPL-MCNC: 78 MG/DL (ref 0–149)
VLDLC SERPL CALC-MCNC: 16 MG/DL (ref 5–40)

## 2019-03-18 DIAGNOSIS — E78.2 MIXED HYPERLIPIDEMIA: Primary | ICD-10-CM

## 2019-03-18 NOTE — LETTER
3/18/2019 3:06 PM 
 
Mr. Edward Spear [address] [address] [address] Dear Edward Spear: 
 
Please find your most recent results below. Resulted Orders LIPID PANEL Result Value Ref Range Cholesterol, total 133 100 - 199 mg/dL Triglyceride 78 0 - 149 mg/dL HDL Cholesterol 48 >39 mg/dL VLDL, calculated 16 5 - 40 mg/dL LDL, calculated 69 0 - 99 mg/dL Narrative Performed at:  [address]  [id number] : Jorge Haynes MD, Phone:  [phone] METABOLIC PANEL, COMPREHENSIVE Result Value Ref Range Glucose 114 (H) 65 - 99 mg/dL BUN 18 8 - 27 mg/dL Creatinine 0.79 0.76 - 1.27 mg/dL GFR est non-AA 90 >59 mL/min/1.73 GFR est  >59 mL/min/1.73  
 BUN/Creatinine ratio 23 10 - 24 Sodium 143 134 - 144 mmol/L Potassium 4.4 3.5 - 5.2 mmol/L Chloride 104 96 - 106 mmol/L  
 CO2 24 20 - 29 mmol/L Calcium 8.9 8.6 - 10.2 mg/dL Protein, total 5.7 (L) 6.0 - 8.5 g/dL Albumin 4.0 3.5 - 4.8 g/dL GLOBULIN, TOTAL 1.7 1.5 - 4.5 g/dL A-G Ratio 2.4 (H) 1.2 - 2.2 Bilirubin, total 0.3 0.0 - 1.2 mg/dL Alk. phosphatase 71 39 - 117 IU/L  
 AST (SGOT) 15 0 - 40 IU/L  
 ALT (SGPT) 16 0 - 44 IU/L Narrative Performed at:  [address]  [id number] : Jorge Haynes MD, Phone:  [phone] CVD REPORT Result Value Ref Range INTERPRETATION Note Comment:  
   Supplemental report is available. Narrative Performed at:  [address] [address] [place], [place]  [id number] : Rosita Rodriguez MD, Phone:  [phone] RECOMMENDATIONS: Labs look great. Stay on medications and we will repeat labs in 6 months Please call me if you have any questions: [phone]. Sincerely, MD Alpa Parker, RN

## 2019-03-21 ENCOUNTER — OFFICE VISIT (OUTPATIENT)
Dept: CARDIOLOGY CLINIC | Age: 69
End: 2019-03-21

## 2019-03-21 ENCOUNTER — CLINICAL SUPPORT (OUTPATIENT)
Dept: CARDIOLOGY CLINIC | Age: 69
End: 2019-03-21

## 2019-03-21 VITALS
BODY MASS INDEX: 32.48 KG/M2 | SYSTOLIC BLOOD PRESSURE: 122 MMHG | RESPIRATION RATE: 16 BRPM | HEART RATE: 60 BPM | DIASTOLIC BLOOD PRESSURE: 62 MMHG | OXYGEN SATURATION: 97 % | HEIGHT: 71 IN | WEIGHT: 232 LBS

## 2019-03-21 VITALS
WEIGHT: 232 LBS | BODY MASS INDEX: 32.48 KG/M2 | SYSTOLIC BLOOD PRESSURE: 122 MMHG | HEIGHT: 71 IN | OXYGEN SATURATION: 97 % | DIASTOLIC BLOOD PRESSURE: 62 MMHG | RESPIRATION RATE: 14 BRPM | HEART RATE: 60 BPM

## 2019-03-21 DIAGNOSIS — E78.2 MIXED HYPERLIPIDEMIA: ICD-10-CM

## 2019-03-21 DIAGNOSIS — Z95.810 IMPLANTABLE CARDIOVERTER-DEFIBRILLATOR (ICD) IN SITU: ICD-10-CM

## 2019-03-21 DIAGNOSIS — I25.5 ISCHEMIC CARDIOMYOPATHY: ICD-10-CM

## 2019-03-21 DIAGNOSIS — C34.12 MALIGNANT NEOPLASM OF UPPER LOBE OF LEFT LUNG (HCC): ICD-10-CM

## 2019-03-21 DIAGNOSIS — I71.40 ABDOMINAL AORTIC ANEURYSM (AAA) WITHOUT RUPTURE: ICD-10-CM

## 2019-03-21 DIAGNOSIS — Z95.1 HX OF CABG: ICD-10-CM

## 2019-03-21 DIAGNOSIS — I47.1 PAT (PAROXYSMAL ATRIAL TACHYCARDIA) (HCC): ICD-10-CM

## 2019-03-21 DIAGNOSIS — Z95.0 CARDIAC PACEMAKER IN SITU: Primary | ICD-10-CM

## 2019-03-21 DIAGNOSIS — I25.10 ATHEROSCLEROSIS OF NATIVE CORONARY ARTERY OF NATIVE HEART WITHOUT ANGINA PECTORIS: Primary | ICD-10-CM

## 2019-03-21 DIAGNOSIS — Z95.810 ICD (IMPLANTABLE CARDIOVERTER-DEFIBRILLATOR) IN PLACE: Primary | ICD-10-CM

## 2019-03-21 DIAGNOSIS — Z79.899 ENCOUNTER FOR MONITORING SOTALOL THERAPY: ICD-10-CM

## 2019-03-21 DIAGNOSIS — I49.3 PVC (PREMATURE VENTRICULAR CONTRACTION): ICD-10-CM

## 2019-03-21 DIAGNOSIS — Z51.81 ENCOUNTER FOR MONITORING SOTALOL THERAPY: ICD-10-CM

## 2019-03-21 DIAGNOSIS — I25.10 ATHEROSCLEROSIS OF NATIVE CORONARY ARTERY OF NATIVE HEART WITHOUT ANGINA PECTORIS: ICD-10-CM

## 2019-03-21 DIAGNOSIS — I25.9 ISCHEMIC HEART DISEASE: ICD-10-CM

## 2019-03-21 NOTE — PROGRESS NOTES
1. Have you been to the ER, urgent care clinic since your last visit? Hospitalized since your last visit? No 
 
2. Have you seen or consulted any other health care providers outside of the [address] [place] [address] since your last visit? Include any pap smears or colon screening. No 
 
3) Do you have an Advance Directive on file? no 
 
Patient is accompanied by self I have received verbal consent from Ary Wheeler to discuss any/all medical information while they are present in the room.

## 2019-03-21 NOTE — PROGRESS NOTES
HISTORY OF PRESENT ILLNESS Simona Mccarty is a 68 y.o. male SUMMARY:  
Problem List  Date Reviewed: 3/20/2019 Codes Class Noted Abdominal aortic aneurysm (AAA) ([facility] ICD-10-CM: I71.4 ICD-9-CM: 441.4  3/30/2018 Overview Signed 3/30/2018  9:58 AM by Jinny Gorman MD  
  3/18  4.0 cm on ct scan 
  
  
   
 Presence of -recalled ICD lead, subsequent encounter ICD-10-CM: T82.198D ICD-9-CM: V58.89, 996.04  10/2/2017 Overview Signed 10/2/2017  5:33 PM by Yazmin Calderon MD  
  RIATA ICD lead Lung cancer, upper lobe ([facility] ICD-10-CM: C34.10 ICD-9-CM: 162.3  9/27/2015 Overview Signed 9/27/2015  5:49 PM by Yazmin Calderon MD  
   3 cm left upper lobe lung cancer stage IB 9/2015 PAT (paroxysmal atrial tachycardia) (HCC) ICD-10-CM: I47.1 ICD-9-CM: 427.0  Unknown Encounter for long-term (current) use of high-risk medication ICD-10-CM: Z79.899 ICD-9-CM: V58.69  7/27/2012 Overview Signed 7/27/2012  9:07 AM by Yazmin Calderon MD  
  sotalol Coronary atherosclerosis of native coronary artery ICD-10-CM: I25.10 ICD-9-CM: 414.01  4/26/2012 Overview Addendum 4/5/2017  7:00 AM by Jinny Gorman MD  
  2/10 lexiscan cardiolyte, mostly fixed inferolateral defect. lvef 49% 9/16 lexiscan cardiolyte, mostly fixed inferolateral defect  lvef 42% Cardiomyopathy ([facility] ICD-10-CM: I42.9 ICD-9-CM: 425.4  4/26/2012 Unspecified cardiovascular disease ICD-10-CM: I25.10 ICD-9-CM: 429.2  9/14/2010 Overview Signed 10/13/2010 12:52 PM by Hollie Lockhart MD  
  Mr. Asher Workman cardiac history dates back to 1995 when he presented with unstable angina while living in [place] and underwent balloon angioplasty-vessel unknown. In June 2004, he had a cardiac arrest associated with an acute lateral MI.  Cardiac catheterization at that time showed severe two vessel coronary disease and he had stents placed in both his obtuse marginal, and a week later in his right coronary artery. In January 2005, he had recurrent symptoms in association with an abnormal stress test and had a drug-eluting stent placed within the previously stented segment of his obtuse marginal branch. Last cardiac catheterization was in July 2005, and it showed him to have stable anatomy. He had an AICD implanted in 2004 after his out of hospital arrest. Repeat cath 2/10 show instent restenosis rca, treated at that time with BARB. LVEF 2/10 40-45%. Cardiac arrest [facility]) ICD-10-CM: I46.9 ICD-9-CM: 427.5  9/14/2010 Overview Signed 10/2/2017  5:32 PM by Pascual iSnger MD  
  Secondary prevention of sudden death. The initial ICD was implanted for this in [place] when he collapsed and was in VT in the parking lot of the [facility] Other specified forms of chronic ischemic heart disease ICD-10-CM: I25.89 ICD-9-CM: 414.8  9/14/2010 Implantable cardioverter-defibrillator (ICD) in situ ICD-10-CM: Z95.810 ICD-9-CM: V45.02  9/14/2010 Overview Addendum 10/2/2017  5:32 PM by Pascual Singer MD  
  Generator change 10/02/17 Dr Charley Allison Secondary prevention of sudden death. The initial ICD was implanted for this in [place] when he collapsed and was in [place] in the parking lot of the [facility] Hyperlipidemia, unspecified ICD-10-CM: E78.5 ICD-9-CM: 272.4  9/14/2010 Paroxysmal ventricular tachycardia (HCC) ICD-10-CM: I47.2 ICD-9-CM: 427.1  9/14/2010 Ventricular fibrillation ([address] ICD-10-CM: I49.01 
ICD-9-CM: 427.41  9/14/2010 Current Outpatient Medications on File Prior to Visit Medication Sig  Bifidobacterium Infantis (ALIGN) 4 mg cap Take  by mouth.  atorvastatin (LIPITOR) 80 mg tablet TAKE 1 TABLET NIGHTLY  nitroglycerin (NITROLINGUAL) 400 mcg/spray spray USE ONE SPRAY UNDER THE TONGUE EVERY 5 MINUTES AS NEEDED  
 sotalol (BETAPACE) 120 mg tablet TAKE 1 TABLET TWICE A DAY  
  clopidogrel (PLAVIX) 75 mg tab TAKE 1 TABLET DAILY  predniSONE (DELTASONE) 10 mg tablet Take  by mouth daily (with breakfast).  MAGNESIUM CITRATE PO Take 1 Tab by mouth daily.  PSYLLIUM SEED (PSYLLIUM PO) Take 2 Tabs by mouth daily.  aclidinium bromide (TUDORZA PRESSAIR) 400 mcg/actuation inhaler Take 1 Puff by inhalation.  aspirin delayed-release 81 mg tablet Take  by mouth daily.  0.9% sodium chloride solp 100 mL with nivolumab 40 mg/4 mL soln by IntraVENous route once.  guaiFENesin (MUCINEX) 1,200 mg Ta12 ER tablet Take 1,200 mg by mouth daily.  loratadine (CLARITIN) 10 mg tablet Take 10 mg by mouth daily.  azelastine-fluticasone (DYMISTA) 137-50 mcg/spray spry by Nasal route two (2) times a day.  fluticasone-salmeterol (ADVAIR) 500-50 mcg/dose diskus inhaler Take 1 Puff by inhalation every twelve (12) hours.  docusate sodium (COLACE) 100 mg capsule Take 100 mg by mouth two (2) times a day.  tamsulosin (FLOMAX) 0.4 mg capsule Take 0.4 mg by mouth daily.  OTHER,NON-FORMULARY, Take 1 g by mouth every evening. Indications: 2 units every evening for immune support  multivitamin (ONE A DAY) tablet Take 1 Tab by mouth daily.  B.infantis-B.ani-B.long-B.bifi (PROBIOTIC 4X) 10-15 mg TbEC Take  by mouth two (2) times a day.  omega-3 fatty acids-vitamin e (FISH OIL) 1,000 mg cap Take 1 Cap by mouth daily.  zolpidem (AMBIEN) 10 mg tablet Take  by mouth nightly as needed. No current facility-administered medications on file prior to visit. CARDIOLOGY STUDIES TO DATE: 
2/10 lexiscan cardiolyte, mostly fixed inferolateral defect. lvef 49% 9/16 lexiscan cardiolyte, mostly fixed inferolateral defect  lvef 42% 10/18 echo lvef 40-45%, lae, mild mr and tr without pul htn Chief Complaint Patient presents with  Coronary Artery Disease HPI : 
Mr. Emre Sung is doing pretty well.   He is on chronic oxygen now, which really helps with his breathing and he is also doing pulmonary rehab at [facility], which has helped with his strength. He has developed diabetes. His most recent lipid profile looked outstanding and we went over that. No problems with his medications. Thus far, he has had no evidence of recurrence of his lung cancer. CARDIAC ROS:  
negative for chest pain, palpitations, syncope, orthopnea, paroxysmal nocturnal dyspnea, exertional chest pressure/discomfort, claudication, lower extremity edema Family History Problem Relation Age of Onset  Heart Disease Neg Hx Past Medical History:  
Diagnosis Date  Automatic implantable cardiac defibrillator in situ 9/14/2010  Cardiac arrest ([facility] 9/14/2010  ICD (implantable cardiac defibrillator) in place  Other and unspecified hyperlipidemia 9/14/2010  Other specified forms of chronic ischemic heart disease 9/14/2010  Paroxysmal ventricular tachycardia ([facility] 9/14/2010  PAT (paroxysmal atrial tachycardia) (HCC)  Unspecified cardiovascular disease 9/14/2010  Ventricular fibrillation ([facility] 9/14/2010 GENERAL ROS: 
A comprehensive review of systems was negative except for that written in the HPI. Visit Vitals /62 (BP 1 Location: Left arm, BP Patient Position: Sitting) Pulse 60 Resp 16 Ht 5' 11\" (1.803 m) Wt 232 lb (105.2 kg) SpO2 97% BMI 32.36 kg/m² Wt Readings from Last 3 Encounters:  
03/21/19 232 lb (105.2 kg) 03/21/19 232 lb (105.2 kg) 09/28/18 233 lb (105.7 kg) BP Readings from Last 3 Encounters:  
03/21/19 122/62  
03/21/19 122/62  
09/28/18 118/72 PHYSICAL EXAM 
General appearance: alert, cooperative, no distress, appears stated age Neurologic: Alert and oriented X 3 Neck: supple, symmetrical, trachea midline, no adenopathy, no carotid bruit and no JVD Lungs: clear to auscultation bilaterally Heart: regular rate and rhythm, S1, S2 normal, no murmur, click, rub or gallop Extremities: extremities normal, atraumatic, no cyanosis or edema Lab Results Component Value Date/Time Cholesterol, total 133 03/14/2019 08:56 AM  
 Cholesterol, total 134 01/24/2019 08:05 AM  
 Cholesterol, total 128 10/03/2018 07:41 AM  
 Cholesterol, total 144 09/27/2017 07:16 AM  
 Cholesterol, total 133 04/11/2017 07:29 AM  
 HDL Cholesterol 48 03/14/2019 08:56 AM  
 HDL Cholesterol 39 (L) 01/24/2019 08:05 AM  
 HDL Cholesterol 40 10/03/2018 07:41 AM  
 HDL Cholesterol 43 09/27/2017 07:16 AM  
 HDL Cholesterol 39 (L) 04/11/2017 07:29 AM  
 LDL, calculated 69 03/14/2019 08:56 AM  
 LDL, calculated 74 01/24/2019 08:05 AM  
 LDL, calculated 64 10/03/2018 07:41 AM  
 LDL, calculated 80 09/27/2017 07:16 AM  
 LDL, calculated 73 04/11/2017 07:29 AM  
 Triglyceride 78 03/14/2019 08:56 AM  
 Triglyceride 103 01/24/2019 08:05 AM  
 Triglyceride 121 10/03/2018 07:41 AM  
 Triglyceride 104 09/27/2017 07:16 AM  
 Triglyceride 107 04/11/2017 07:29 AM  
 
ASSESSMENT Mr. Milana Boo is stable, asymptomatic and well compensated on a good medical regimen and needs no cardiac testing at this time. We gave him a lab slip for follow-up blood work. current treatment plan is effective, no change in therapy 
lab results and schedule of future lab studies reviewed with patient 
reviewed diet, exercise and weight control Encounter Diagnoses Name Primary?  Atherosclerosis of native coronary artery of native heart without angina pectoris Yes  Abdominal aortic aneurysm (AAA) without rupture ([address]  Ischemic cardiomyopathy  Mixed hyperlipidemia  Implantable cardioverter-defibrillator (ICD) in situ Orders Placed This Encounter  Bifidobacterium Infantis (ALIGN) 4 mg cap Delicia Yates MD3/21/2019

## 2019-03-21 NOTE — PROGRESS NOTES
Cardiac Electrophysiology Office Note     Subjective: Sally Calixto is a 68 y.o. man with hx of VT on ICD   He had ICD replaced 10/2017 (old MI, ischemic cardiomyopathy and history of sudden cardiac death. He has chronic systolic CHF with LVEF 68% on recent stress test.)  He is patient of Dr Edward Jeramie test 10/2016 read by Dr Sharon Lutz, mostly scars and LVEF 42%  He used steroid for COPD   He thinks radiation injured his lung and is on oxygen all the times  Occasional palpitation  He goes to [facility] lung rehab 3 times a week    stress test 2/2010: mild rev ischemia in inferolateral wall mixed with mainly fixed defect, LVEF 49%  A self terminating episode of VT (in VF zone) in June, 2012 seen on AICD  He has been on sotalol and the last ICD check in January did not show ventricular tachycardia but rather he had several episodes of nonsustained atrial tachycardia and the longest episode was about 10 seconds. He was completely unaware of the atrial tachycardia episode. ICD [facility] lead was examined under fluoroscopy. There was no evidence of lead breakdown. There is no evidence of electrical noises so far on the lead. This ICD lead is on recall.     ICD replaced 10/2/2017                        Record from Dr Keila Luna: 3 cm left upper lobe lung cancer stage IB  ICD Riata lead with  KRISTEN 3/3/2010        Problem List  Date Reviewed: 3/20/2019          Codes Class Noted    Abdominal aortic aneurysm (AAA) ([facility] ICD-10-CM: I71.4  ICD-9-CM: 441.4  3/30/2018    Overview Signed 3/30/2018  9:58 AM by Jacinta Wade MD     3/18  4.0 cm on ct scan             Presence of -recalled ICD lead, subsequent encounter ICD-10-CM: T82.198D  ICD-9-CM: V58.89, 996.04  10/2/2017    Overview Signed 10/2/2017  5:33 PM by Afua Sheridan MD     RIAAME ICD lead             Lung cancer, [facility]) ICD-10-CM: C34.10  ICD-9-CM: 162.3  9/27/2015    Overview Signed 9/27/2015  5:49 PM by Afua Sheridan MD 3 cm left upper lobe lung cancer stage IB 9/2015             PAT (paroxysmal atrial tachycardia) (HCC) ICD-10-CM: I47.1  ICD-9-CM: 427.0  Unknown        Encounter for long-term (current) use of high-risk medication ICD-10-CM: Z79.899  ICD-9-CM: V58.69  7/27/2012    Overview Signed 7/27/2012  9:07 AM by Joby Aly MD     sotalol             Coronary atherosclerosis of native coronary artery ICD-10-CM: I25.10  ICD-9-CM: 414.01  4/26/2012    Overview Addendum 4/5/2017  7:00 AM by Indira Ledezma MD     2/10 lexiscan cardiolyte, mostly fixed inferolateral defect. lvef 49%  9/16 lexiscan cardiolyte, mostly fixed inferolateral defect  lvef 42%             Cardiomyopathy ([facility] ICD-10-CM: I42.9  ICD-9-CM: 425.4  4/26/2012        Unspecified cardiovascular disease ICD-10-CM: I25.10  ICD-9-CM: 429.2  9/14/2010    Overview Signed 10/13/2010 12:52 PM by Evelia Doe MD     Mr. Lori Adams cardiac history dates back to 1995 when he presented with unstable angina while living in [place] and underwent balloon angioplasty-vessel unknown. In June 2004, he had a cardiac arrest associated with an acute lateral MI. Cardiac catheterization at that time showed severe two vessel coronary disease and he had stents placed in both his obtuse marginal, and a week later in his right coronary artery. In January 2005, he had recurrent symptoms in association with an abnormal stress test and had a drug-eluting stent placed within the previously stented segment of his obtuse marginal branch. Last cardiac catheterization was in July 2005, and it showed him to have stable anatomy. He had an AICD implanted in 2004 after his out of hospital arrest. Repeat cath 2/10 show instent restenosis rca, treated at that time with BARB. LVEF 2/10 40-45%. Cardiac arrest [facility]) ICD-10-CM: I46.9  ICD-9-CM: 427.5  9/14/2010    Overview Signed 10/2/2017  5:32 PM by Joby Aly MD     Secondary prevention of sudden death.   The initial ICD was implanted for this in [place] when he collapsed and was in [place] in the parking lot of [employer]             Other specified forms of chronic ischemic heart disease ICD-10-CM: I25.89  ICD-9-CM: 414.8  9/14/2010        Implantable cardioverter-defibrillator (ICD) in situ ICD-10-CM: Z95.810  ICD-9-CM: V45.02  9/14/2010    Overview Addendum 10/2/2017  5:32 PM by Jose Enrique Chaves MD     Generator change 10/02/17  Dr King Goode  Secondary prevention of sudden death. The initial ICD was implanted for this in [place] when he collapsed and was in [place] in the parking lot of the [employer]             Hyperlipidemia, unspecified ICD-10-CM: E78.5  ICD-9-CM: 272.4  9/14/2010        Paroxysmal ventricular tachycardia (HCC) ICD-10-CM: I47.2  ICD-9-CM: 427.1  9/14/2010        Ventricular fibrillation ([address] ICD-10-CM: I49.01  ICD-9-CM: 427.41  9/14/2010                Current Outpatient Medications   Medication Sig Dispense Refill    atorvastatin (LIPITOR) 80 mg tablet TAKE 1 TABLET NIGHTLY 90 Tab 3    nitroglycerin (NITROLINGUAL) 400 mcg/spray spray USE ONE SPRAY UNDER THE TONGUE EVERY 5 MINUTES AS NEEDED 1 Bottle 3    sotalol (BETAPACE) 120 mg tablet TAKE 1 TABLET TWICE A  Tab 1    clopidogrel (PLAVIX) 75 mg tab TAKE 1 TABLET DAILY 90 Tab 1    predniSONE (DELTASONE) 10 mg tablet Take  by mouth daily (with breakfast).  MAGNESIUM CITRATE PO Take 1 Tab by mouth daily.  PSYLLIUM SEED (PSYLLIUM PO) Take 2 Tabs by mouth daily.  aclidinium bromide (TUDORZA PRESSAIR) 400 mcg/actuation inhaler Take 1 Puff by inhalation.  aspirin delayed-release 81 mg tablet Take  by mouth daily.  0.9% sodium chloride solp 100 mL with nivolumab 40 mg/4 mL soln by IntraVENous route once.  guaiFENesin (MUCINEX) 1,200 mg Ta12 ER tablet Take 1,200 mg by mouth daily.  loratadine (CLARITIN) 10 mg tablet Take 10 mg by mouth daily.       azelastine-fluticasone (DYMISTA) 137-50 mcg/spray spry by Nasal route two (2) times a day.  fluticasone-salmeterol (ADVAIR) 500-50 mcg/dose diskus inhaler Take 1 Puff by inhalation every twelve (12) hours.  docusate sodium (COLACE) 100 mg capsule Take 100 mg by mouth two (2) times a day.  tamsulosin (FLOMAX) 0.4 mg capsule Take 0.4 mg by mouth daily.  OTHER,NON-FORMULARY, Take 1 g by mouth every evening. Indications: 2 units every evening for immune support      multivitamin (ONE A DAY) tablet Take 1 Tab by mouth daily.  B.infantis-B.ani-B.long-B.bifi (PROBIOTIC 4X) 10-15 mg TbEC Take  by mouth two (2) times a day.  omega-3 fatty acids-vitamin e (FISH OIL) 1,000 mg cap Take 1 Cap by mouth daily. 90 Cap 3    zolpidem (AMBIEN) 10 mg tablet Take  by mouth nightly as needed. No Known Allergies  Past Medical History:   Diagnosis Date    Automatic implantable cardiac defibrillator in situ 2010    Cardiac arrest ([address] 2010    ICD (implantable cardiac defibrillator) in place     Other and unspecified hyperlipidemia 2010    Other specified forms of chronic ischemic heart disease 2010    Paroxysmal ventricular tachycardia ([address] 2010    PAT (paroxysmal atrial tachycardia) ([address]     Unspecified cardiovascular disease 2010    Ventricular fibrillation ([address] 2010     Past Surgical History:   Procedure Laterality Date    HX CHOLECYSTECTOMY          HX HEART CATHETERIZATION      NASAL SURG PROC UNLISTED      polyps and deviated septum    NM EPHYS EVAL PACG CVDFB LDS INITIAL IMPLAN/REPLACE  10/2/2017         NM RMVL IMPLTBL DFB PLSE GEN W/RPLCMT PLSE GEN 2 LD  10/2/2017         XR FLUOROSCOPY UNDER 60 MINUTES  10/3/2012            Social History     Tobacco Use    Smoking status: Former Smoker     Last attempt to quit: 2004     Years since quittin.6    Smokeless tobacco: Never Used   Substance Use Topics    Alcohol use:  Yes     Alcohol/week: 0.0 oz     Comment: occasional        Review of Systems: Constitutional: Negative for fever, chills, weight loss, malaise/fatigue. HEENT: Negative for nosebleeds, vision changes. Respiratory: Negative for cough, and wheezing. Cardiovascular: Negative for palpitations, orthopnea, claudication, leg swelling, syncope, and PND. Gastrointestinal: Negative for nausea, vomiting, diarrhea, melena. Genitourinary: Negative for hematuria. Musculoskeletal: Negative for myalgias, arthralgia. Skin: Negative for rash. Heme: Does not bleed or bruise easily. Neurological: Negative for speech change and focal weakness     Objective:     Visit Vitals  /62 (BP 1 Location: Left arm, BP Patient Position: Sitting)   Pulse 60   Resp 14   Ht 5' 11\" (1.803 m)   Wt 232 lb (105.2 kg)   SpO2 97%   BMI 32.36 kg/m²        Physical Exam:   Constitutional: well-developed and well-nourished. No distress. Head: Normocephalic and atraumatic. Eyes: Pupils are equal, round  Neck: supple. No JVD present. Cardiovascular: Normal rate, regular rhythm and normal heart sounds. Exam reveals no gallop and no friction rub. No murmur heard. Pulmonary/Chest: Effort normal and breath sounds normal. No wheezes. Abdominal: Soft, obese  Musculoskeletal: no edema. Neurological: alert,oriented. Skin: Skin is warm and dry and ICD site is healed  Psychiatric: normal mood and affect. Behavior is normal. Judgment and thought content normal.      ECG NSR and normal QTc and PVCs    Assessment/Plan:       ICD-10-CM ICD-9-CM    1. ICD (implantable cardioverter-defibrillator) in place Z95.810 V45.02    2. Encounter for monitoring sotalol therapy Z51.81 V58.83 AMB POC EKG ROUTINE W/ 12 LEADS, INTER & REP    Z79.899 V58.69    3. Atherosclerosis of native coronary artery of native heart without angina pectoris I25.10 414.01    4. Ischemic heart disease I25.9 414.9    5. Ischemic cardiomyopathy I25.5 414.8    6. Hx of CABG Z95.1 V45.81    7.  Malignant neoplasm of upper lobe of left lung ([address] C34.12 162.3    8. PAT (paroxysmal atrial tachycardia) (HCC) I47.1 427.0    9. 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  COPD is dx by his pulmonologist  patient understands and would like to continue to monitor in office as usual.    He does not want ICD Riata lead extracted and replaced   Battery 6-7 years left     Follow-up and Dispositions    · Return in about 1 year (around 3/21/2020). Megan Rodriguez M.D.   Electrophysiology/Cardiology  [facility]  [address] [place] [address], [address] [place] [address]  [phone]

## 2019-04-23 ENCOUNTER — OP HISTORICAL/CONVERTED ENCOUNTER (OUTPATIENT)
Dept: OTHER | Age: 69
End: 2019-04-23

## 2019-04-25 ENCOUNTER — OP HISTORICAL/CONVERTED ENCOUNTER (OUTPATIENT)
Dept: OTHER | Age: 69
End: 2019-04-25

## 2019-07-02 DIAGNOSIS — I49.01 VENTRICULAR FIBRILLATION (HCC): ICD-10-CM

## 2019-07-02 DIAGNOSIS — I47.29 PAROXYSMAL VENTRICULAR TACHYCARDIA: ICD-10-CM

## 2019-07-02 RX ORDER — CLOPIDOGREL BISULFATE 75 MG/1
TABLET ORAL
Qty: 90 TAB | Refills: 1 | Status: SHIPPED | OUTPATIENT
Start: 2019-07-02 | End: 2019-12-30

## 2019-07-02 NOTE — TELEPHONE ENCOUNTER
Requested Prescriptions     Signed Prescriptions Disp Refills    clopidogrel (PLAVIX) 75 mg tab 90 Tab 1     Sig: TAKE 1 TABLET DAILY     Authorizing Provider: Evangelina Naqvi     Ordering User: Billy Stephens    Per Dr. Sayra Monsalve verbal orders

## 2019-07-13 DIAGNOSIS — I47.29 PAROXYSMAL VENTRICULAR TACHYCARDIA: ICD-10-CM

## 2019-07-13 DIAGNOSIS — I42.9 CARDIOMYOPATHY, UNSPECIFIED TYPE (HCC): ICD-10-CM

## 2019-07-13 DIAGNOSIS — I47.1 PAT (PAROXYSMAL ATRIAL TACHYCARDIA) (HCC): ICD-10-CM

## 2019-07-13 DIAGNOSIS — I25.10 CARDIOVASCULAR DISEASE: ICD-10-CM

## 2019-07-13 DIAGNOSIS — I49.01 VENTRICULAR FIBRILLATION (HCC): ICD-10-CM

## 2019-07-13 DIAGNOSIS — I25.9 ISCHEMIC HEART DISEASE: ICD-10-CM

## 2019-07-13 DIAGNOSIS — E78.5 HYPERLIPIDEMIA, UNSPECIFIED HYPERLIPIDEMIA TYPE: ICD-10-CM

## 2019-07-13 DIAGNOSIS — Z95.810 ICD (IMPLANTABLE CARDIOVERTER-DEFIBRILLATOR) IN PLACE: ICD-10-CM

## 2019-07-13 DIAGNOSIS — I46.9 CARDIAC ARREST (HCC): ICD-10-CM

## 2019-07-15 RX ORDER — SOTALOL HYDROCHLORIDE 120 MG/1
TABLET ORAL
Qty: 180 TAB | Refills: 1 | Status: SHIPPED | OUTPATIENT
Start: 2019-07-15 | End: 2020-01-13

## 2019-07-25 ENCOUNTER — HOSPITAL ENCOUNTER (OUTPATIENT)
Dept: LAB | Age: 69
Discharge: HOME | End: 2019-07-25
Payer: MEDICARE

## 2019-07-25 PROCEDURE — 80053 COMPREHEN METABOLIC PANEL: CPT

## 2019-07-25 PROCEDURE — 36415 COLL VENOUS BLD VENIPUNCTURE: CPT

## 2019-07-25 PROCEDURE — 80061 LIPID PANEL: CPT

## 2019-07-26 ENCOUNTER — TELEPHONE (OUTPATIENT)
Dept: CARDIOLOGY CLINIC | Age: 69
End: 2019-07-26

## 2019-07-26 DIAGNOSIS — I47.1 PAT (PAROXYSMAL ATRIAL TACHYCARDIA) (HCC): Primary | ICD-10-CM

## 2019-07-26 DIAGNOSIS — I25.9 ISCHEMIC HEART DISEASE: ICD-10-CM

## 2019-07-26 DIAGNOSIS — I25.10 CARDIOVASCULAR DISEASE: ICD-10-CM

## 2019-07-26 DIAGNOSIS — E78.5 HYPERLIPIDEMIA, UNSPECIFIED HYPERLIPIDEMIA TYPE: ICD-10-CM

## 2019-07-26 DIAGNOSIS — I47.29 PAROXYSMAL VENTRICULAR TACHYCARDIA: ICD-10-CM

## 2019-07-26 DIAGNOSIS — I42.9 CARDIOMYOPATHY, UNSPECIFIED TYPE (HCC): ICD-10-CM

## 2019-07-26 DIAGNOSIS — I46.9 CARDIAC ARREST (HCC): ICD-10-CM

## 2019-07-26 LAB
ALBUMIN SERPL-MCNC: 4 G/DL (ref 3.5–4.8)
ALBUMIN/GLOB SERPL: 2.5 {RATIO} (ref 1.2–2.2)
ALP SERPL-CCNC: 65 IU/L (ref 39–117)
ALT SERPL-CCNC: 16 IU/L (ref 0–44)
AST SERPL-CCNC: 19 IU/L (ref 0–40)
BILIRUB SERPL-MCNC: 0.3 MG/DL (ref 0–1.2)
BUN SERPL-MCNC: 21 MG/DL (ref 8–27)
BUN/CREAT SERPL: 23 (ref 10–24)
CALCIUM SERPL-MCNC: 9.2 MG/DL (ref 8.6–10.2)
CHLORIDE SERPL-SCNC: 105 MMOL/L (ref 96–106)
CHOLEST SERPL-MCNC: 131 MG/DL (ref 100–199)
CO2 SERPL-SCNC: 25 MMOL/L (ref 20–29)
CREAT SERPL-MCNC: 0.9 MG/DL (ref 0.76–1.27)
GLOBULIN SER CALC-MCNC: 1.6 G/DL (ref 1.5–4.5)
GLUCOSE SERPL-MCNC: 95 MG/DL (ref 65–99)
HDLC SERPL-MCNC: 43 MG/DL
INTERPRETATION, 910389: NORMAL
LDLC SERPL CALC-MCNC: 70 MG/DL (ref 0–99)
POTASSIUM SERPL-SCNC: 4.6 MMOL/L (ref 3.5–5.2)
PROT SERPL-MCNC: 5.6 G/DL (ref 6–8.5)
SODIUM SERPL-SCNC: 143 MMOL/L (ref 134–144)
TRIGL SERPL-MCNC: 88 MG/DL (ref 0–149)
VLDLC SERPL CALC-MCNC: 18 MG/DL (ref 5–40)

## 2019-07-26 NOTE — TELEPHONE ENCOUNTER
----- Message from Catarina Alaniz MD sent at 7/26/2019  8:08 AM EDT -----  Cholesterol is great.  Sugar normal. Stay on meds and repeat 6mos

## 2019-07-26 NOTE — TELEPHONE ENCOUNTER
----- Message from David Quispe MD sent at 7/26/2019  8:08 AM EDT -----  Cholesterol is great. Sugar normal. Stay on meds and repeat 6mos    Left message for call back    2 pt identifiers used  Pt. Informed of results and labs to be done in 6 months.  He verbalized his understanding

## 2019-07-29 ENCOUNTER — OP HISTORICAL/CONVERTED ENCOUNTER (OUTPATIENT)
Dept: OTHER | Age: 69
End: 2019-07-29

## 2019-09-30 ENCOUNTER — OFFICE VISIT (OUTPATIENT)
Dept: CARDIOLOGY CLINIC | Age: 69
End: 2019-09-30

## 2019-09-30 VITALS
BODY MASS INDEX: 28.19 KG/M2 | RESPIRATION RATE: 16 BRPM | HEART RATE: 48 BPM | DIASTOLIC BLOOD PRESSURE: 50 MMHG | OXYGEN SATURATION: 98 % | HEIGHT: 71 IN | SYSTOLIC BLOOD PRESSURE: 110 MMHG | WEIGHT: 201.4 LBS

## 2019-09-30 DIAGNOSIS — E78.5 HYPERLIPIDEMIA, UNSPECIFIED HYPERLIPIDEMIA TYPE: Primary | ICD-10-CM

## 2019-09-30 RX ORDER — LANOLIN ALCOHOL/MO/W.PET/CERES
500 CREAM (GRAM) TOPICAL 2 TIMES DAILY
COMMUNITY

## 2019-09-30 RX ORDER — UREA 10 %
800 LOTION (ML) TOPICAL DAILY
COMMUNITY

## 2019-09-30 NOTE — PROGRESS NOTES
HISTORY OF PRESENT ILLNESS  Genesis Arrington is a 68 y.o. male     SUMMARY:   Problem List  Date Reviewed: 9/30/2019          Codes Class Noted    Abdominal aortic aneurysm (AAA) ([facility] ICD-10-CM: I71.4  ICD-9-CM: 441.4  3/30/2018    Overview Signed 3/30/2018  9:58 AM by Mitra Pelaez MD     3/18  4.0 cm on ct scan             Presence of -recalled ICD lead, subsequent encounter ICD-10-CM: T82.198D  ICD-9-CM: V58.89, 996.04  10/2/2017    Overview Signed 10/2/2017  5:33 PM by Christopher Kern MD     [id number] ICD lead             Lung cancer, upper lobe [facility]) ICD-10-CM: C34.10  ICD-9-CM: 162.3  9/27/2015    Overview Signed 9/27/2015  5:49 PM by Christopher Kern MD      3 cm left upper lobe lung cancer stage IB 9/2015             PAT (paroxysmal atrial tachycardia) ([facility] ICD-10-CM: I47.1  ICD-9-CM: 427.0  Unknown        Encounter for long-term (current) use of high-risk medication ICD-10-CM: Z79.899  ICD-9-CM: V58.69  7/27/2012    Overview Signed 7/27/2012  9:07 AM by Christopher Kern MD     sotalol             Coronary atherosclerosis of native coronary artery ICD-10-CM: I25.10  ICD-9-CM: 414.01  4/26/2012    Overview Addendum 4/5/2017  7:00 AM by Mitra Pelaez MD     2/10 lexiscan cardiolyte, mostly fixed inferolateral defect. lvef 49%  9/16 lexiscan cardiolyte, mostly fixed inferolateral defect  lvef 42%             Cardiomyopathy ([facility] ICD-10-CM: I42.9  ICD-9-CM: 425.4  4/26/2012        Unspecified cardiovascular disease ICD-10-CM: I25.10  ICD-9-CM: 429.2  9/14/2010    Overview Signed 10/13/2010 12:52 PM by Deangelo Zee MD     Mr. Galina Lott cardiac history dates back to 1995 when he presented with unstable angina while living in [place] and underwent balloon angioplasty-vessel unknown. In June 2004, he had a cardiac arrest associated with an acute lateral MI.  Cardiac catheterization at that time showed severe two vessel coronary disease and he had stents placed in both his obtuse marginal, and a week later in his right coronary artery. In January 2005, he had recurrent symptoms in association with an abnormal stress test and had a drug-eluting stent placed within the previously stented segment of his obtuse marginal branch. Last cardiac catheterization was in July 2005, and it showed him to have stable anatomy. He had an AICD implanted in 2004 after his out of hospital arrest. Repeat cath 2/10 show instent restenosis rca, treated at that time with BARB. LVEF 2/10 40-45%. Cardiac arrest [facility]) ICD-10-CM: I46.9  ICD-9-CM: 427.5  9/14/2010    Overview Signed 10/2/2017  5:32 PM by Koko Couch MD     Secondary prevention of sudden death. The initial ICD was implanted for this in [place] when he collapsed and was in [place] in the parking lot of the [facility]             Other specified forms of chronic ischemic heart disease ICD-10-CM: I25.89  ICD-9-CM: 414.8  9/14/2010        Implantable cardioverter-defibrillator (ICD) in situ ICD-10-CM: Z95.810  ICD-9-CM: V45.02  9/14/2010    Overview Addendum 10/2/2017  5:32 PM by Koko Couch MD     Generator change 10/02/17  Dr Luan Otoole  Secondary prevention of sudden death. The initial ICD was implanted for this in [place] when he collapsed and was in [place] in the parking lot of the [facility]             Hyperlipidemia, unspecified ICD-10-CM: E78.5  ICD-9-CM: 272.4  9/14/2010        Paroxysmal ventricular tachycardia (HCC) ICD-10-CM: I47.2  ICD-9-CM: 427.1  9/14/2010        Ventricular fibrillation ([address] ICD-10-CM: I49.01  ICD-9-CM: 427.41  9/14/2010              Current Outpatient Medications on File Prior to Visit   Medication Sig    cyanocobalamin (VITAMIN B12) 500 mcg tablet Take 500 mcg by mouth daily.  folic acid 548 mcg tablet Take 800 mcg by mouth daily.  cholecalciferol, vitamin D3, (VITAMIN D3 PO) Take 800 Units by mouth daily.  Oxygen 3- 6 liters continuously per nasal cannula.     sotalol (BETAPACE) 120 mg tablet TAKE 1 TABLET TWICE A DAY    clopidogrel (PLAVIX) 75 mg tab TAKE 1 TABLET DAILY    Bifidobacterium Infantis (ALIGN) 4 mg cap Take 1 Tab by mouth daily.  atorvastatin (LIPITOR) 80 mg tablet TAKE 1 TABLET NIGHTLY    nitroglycerin (NITROLINGUAL) 400 mcg/spray spray USE ONE SPRAY UNDER THE TONGUE EVERY 5 MINUTES AS NEEDED    predniSONE (DELTASONE) 10 mg tablet Take  by mouth daily (with breakfast).  MAGNESIUM CITRATE PO Take 1 Tab by mouth daily.  PSYLLIUM SEED (PSYLLIUM PO) Take 3 Tabs by mouth two (2) times a day.  aclidinium bromide (TUDORZA PRESSAIR) 400 mcg/actuation inhaler Take 1 Puff by inhalation two (2) times a day.  aspirin delayed-release 81 mg tablet Take  by mouth daily.  guaiFENesin (MUCINEX) 1,200 mg Ta12 ER tablet Take 1,200 mg by mouth two (2) times a day.  loratadine (CLARITIN) 10 mg tablet Take 10 mg by mouth daily.  azelastine-fluticasone (DYMISTA) 137-50 mcg/spray spry by Nasal route two (2) times a day.  fluticasone-salmeterol (ADVAIR) 500-50 mcg/dose diskus inhaler Take 1 Puff by inhalation every twelve (12) hours.  docusate sodium (COLACE) 100 mg capsule Take 100 mg by mouth daily.  tamsulosin (FLOMAX) 0.4 mg capsule Take 0.4 mg by mouth daily.  multivitamin (ONE A DAY) tablet Take 1 Tab by mouth daily.  zolpidem (AMBIEN) 10 mg tablet Take  by mouth nightly as needed. No current facility-administered medications on file prior to visit. CARDIOLOGY STUDIES TO DATE:  2/10 lexiscan cardiolyte, mostly fixed inferolateral defect. lvef 49%  9/16 lexiscan cardiolyte, mostly fixed inferolateral defect  lvef 42%  10/18 echo lvef 40-45%, lae, mild mr and tr without pul htn    Chief Complaint   Patient presents with    Follow-up     6 months. HPI :  Mr. Khushboo Booth is doing fine from a cardiac perspective. His recorded heart rate is a little low, but it is because of PVCs when I examined him.   He does get short of breath when he is off his oxygen, but as long as he keeps plugged in, he does just fine. He and his wife are buying a new house so that they can move her mother in with them and his 55year-old son has just been diagnosed with stage 4 colon cancer. Recent lipid profile looked outstanding. CARDIAC ROS:   negative for chest pain, palpitations, syncope, orthopnea, paroxysmal nocturnal dyspnea, exertional chest pressure/discomfort, claudication, lower extremity edema    Family History   Problem Relation Age of Onset    Heart Disease Neg Hx        Past Medical History:   Diagnosis Date    Automatic implantable cardiac defibrillator in situ 9/14/2010    Cardiac arrest ([address] 9/14/2010    ICD (implantable cardiac defibrillator) in place     Other and unspecified hyperlipidemia 9/14/2010    Other specified forms of chronic ischemic heart disease 9/14/2010    Paroxysmal ventricular tachycardia ([address] 9/14/2010    PAT (paroxysmal atrial tachycardia) ([address]     Unspecified cardiovascular disease 9/14/2010    Ventricular fibrillation ([address] 9/14/2010       GENERAL ROS:  A comprehensive review of systems was negative except for that written in the HPI.     Visit Vitals  /50 (BP 1 Location: Right arm, BP Patient Position: Sitting)   Pulse (!) 48 Comment: irregular   Resp 16   Ht 5' 11\" (1.803 m)   Wt 201 lb 6.4 oz (91.4 kg)   SpO2 98%   BMI 28.09 kg/m²       Wt Readings from Last 3 Encounters:   09/30/19 201 lb 6.4 oz (91.4 kg)   03/21/19 232 lb (105.2 kg)   03/21/19 232 lb (105.2 kg)            BP Readings from Last 3 Encounters:   09/30/19 110/50   03/21/19 122/62   03/21/19 122/62       PHYSICAL EXAM  General appearance: alert, cooperative, no distress, appears stated age  Neurologic: Alert and oriented X 3  Neck: supple, symmetrical, trachea midline, no adenopathy, no carotid bruit and no JVD  Lungs: diminished breath sounds L base  Heart: regular rate and rhythm, S1, S2 normal, no murmur, click, rub or gallop  Extremities: extremities normal, atraumatic, no cyanosis or edema    Lab Results   Component Value Date/Time    Cholesterol, total 131 07/25/2019 09:09 AM    Cholesterol, total 133 03/14/2019 08:56 AM    Cholesterol, total 134 01/24/2019 08:05 AM    Cholesterol, total 128 10/03/2018 07:41 AM    Cholesterol, total 144 09/27/2017 07:16 AM    HDL Cholesterol 43 07/25/2019 09:09 AM    HDL Cholesterol 48 03/14/2019 08:56 AM    HDL Cholesterol 39 (L) 01/24/2019 08:05 AM    HDL Cholesterol 40 10/03/2018 07:41 AM    HDL Cholesterol 43 09/27/2017 07:16 AM    LDL, calculated 70 07/25/2019 09:09 AM    LDL, calculated 69 03/14/2019 08:56 AM    LDL, calculated 74 01/24/2019 08:05 AM    LDL, calculated 64 10/03/2018 07:41 AM    LDL, calculated 80 09/27/2017 07:16 AM    Triglyceride 88 07/25/2019 09:09 AM    Triglyceride 78 03/14/2019 08:56 AM    Triglyceride 103 01/24/2019 08:05 AM    Triglyceride 121 10/03/2018 07:41 AM    Triglyceride 104 09/27/2017 07:16 AM     ASSESSMENT  Mr. Jodi Musa is stable and well compensated from a cardiac perspective on a good medical regimen and needs no cardiac testing at this time. current treatment plan is effective, no change in therapy  lab results and schedule of future lab studies reviewed with patient  reviewed diet, exercise and weight control    No diagnosis found. Orders Placed This Encounter    cyanocobalamin (VITAMIN B12) 500 mcg tablet    folic acid 861 mcg tablet    cholecalciferol, vitamin D3, (VITAMIN D3 PO)    Oxygen       Follow-up and Dispositions    · Return in about 6 months (around 3/30/2020).          [address], MD  9/30/2019

## 2019-10-22 ENCOUNTER — OP HISTORICAL/CONVERTED ENCOUNTER (OUTPATIENT)
Dept: OTHER | Age: 69
End: 2019-10-22

## 2019-10-28 ENCOUNTER — OFFICE VISIT (OUTPATIENT)
Dept: CARDIOLOGY CLINIC | Age: 69
End: 2019-10-28

## 2019-10-28 DIAGNOSIS — Z95.810 AUTOMATIC IMPLANTABLE CARDIAC DEFIBRILLATOR IN SITU: Primary | ICD-10-CM

## 2019-10-29 NOTE — PROGRESS NOTES
29-Oct-2019 Chief Complaint   Patient presents with    Cardiomyopathy    Irregular Heart Beat     VT    Follow-up     3 month follow up     Verified patient with two types of identifiers. Verified medications with the patient. Verified pharmacy with patient. 30-Oct-2019

## 2019-12-29 DIAGNOSIS — I49.01 VENTRICULAR FIBRILLATION (HCC): ICD-10-CM

## 2019-12-29 DIAGNOSIS — I47.29 PAROXYSMAL VENTRICULAR TACHYCARDIA: ICD-10-CM

## 2019-12-30 RX ORDER — CLOPIDOGREL BISULFATE 75 MG/1
TABLET ORAL
Qty: 90 TAB | Refills: 4 | Status: SHIPPED | OUTPATIENT
Start: 2019-12-30 | End: 2021-03-24

## 2020-01-11 DIAGNOSIS — E78.5 HYPERLIPIDEMIA, UNSPECIFIED HYPERLIPIDEMIA TYPE: ICD-10-CM

## 2020-01-11 DIAGNOSIS — I47.1 PAT (PAROXYSMAL ATRIAL TACHYCARDIA) (HCC): ICD-10-CM

## 2020-01-11 DIAGNOSIS — I46.9 CARDIAC ARREST (HCC): ICD-10-CM

## 2020-01-11 DIAGNOSIS — I25.10 CARDIOVASCULAR DISEASE: ICD-10-CM

## 2020-01-11 DIAGNOSIS — I49.01 VENTRICULAR FIBRILLATION (HCC): ICD-10-CM

## 2020-01-11 DIAGNOSIS — Z95.810 ICD (IMPLANTABLE CARDIOVERTER-DEFIBRILLATOR) IN PLACE: ICD-10-CM

## 2020-01-11 DIAGNOSIS — I25.9 ISCHEMIC HEART DISEASE: ICD-10-CM

## 2020-01-11 DIAGNOSIS — I42.9 CARDIOMYOPATHY, UNSPECIFIED TYPE (HCC): ICD-10-CM

## 2020-01-11 DIAGNOSIS — I47.29 PAROXYSMAL VENTRICULAR TACHYCARDIA: ICD-10-CM

## 2020-01-13 RX ORDER — SOTALOL HYDROCHLORIDE 120 MG/1
TABLET ORAL
Qty: 180 TAB | Refills: 1 | Status: SHIPPED | OUTPATIENT
Start: 2020-01-13 | End: 2020-07-15

## 2020-01-13 NOTE — TELEPHONE ENCOUNTER
Requested Prescriptions     Signed Prescriptions Disp Refills    sotalol (BETAPACE) 120 mg tablet 180 Tab 1     Sig: TAKE 1 TABLET TWICE A DAY     Authorizing Provider: Kennieth Spatz     Ordering User: Moriah Shirley    Per Dr. Richard Fox verbal orders

## 2020-01-14 ENCOUNTER — OP HISTORICAL/CONVERTED ENCOUNTER (OUTPATIENT)
Dept: OTHER | Age: 70
End: 2020-01-14

## 2020-01-21 ENCOUNTER — HOSPITAL ENCOUNTER (OUTPATIENT)
Dept: LAB | Age: 70
Discharge: HOME | End: 2020-01-21
Payer: MEDICARE

## 2020-01-21 PROCEDURE — 80061 LIPID PANEL: CPT

## 2020-01-21 PROCEDURE — 36415 COLL VENOUS BLD VENIPUNCTURE: CPT

## 2020-01-21 PROCEDURE — 80053 COMPREHEN METABOLIC PANEL: CPT

## 2020-01-22 DIAGNOSIS — E78.5 HYPERLIPIDEMIA, UNSPECIFIED HYPERLIPIDEMIA TYPE: Primary | ICD-10-CM

## 2020-01-22 LAB
ALBUMIN SERPL-MCNC: 4.1 G/DL (ref 3.7–4.7)
ALBUMIN/GLOB SERPL: 2.4 {RATIO} (ref 1.2–2.2)
ALP SERPL-CCNC: 74 IU/L (ref 39–117)
ALT SERPL-CCNC: 18 IU/L (ref 0–44)
AST SERPL-CCNC: 17 IU/L (ref 0–40)
BILIRUB SERPL-MCNC: 0.2 MG/DL (ref 0–1.2)
BUN SERPL-MCNC: 15 MG/DL (ref 8–27)
BUN/CREAT SERPL: 21 (ref 10–24)
CALCIUM SERPL-MCNC: 9.4 MG/DL (ref 8.6–10.2)
CHLORIDE SERPL-SCNC: 101 MMOL/L (ref 96–106)
CHOLEST SERPL-MCNC: 147 MG/DL (ref 100–199)
CO2 SERPL-SCNC: 26 MMOL/L (ref 20–29)
CREAT SERPL-MCNC: 0.73 MG/DL (ref 0.76–1.27)
GLOBULIN SER CALC-MCNC: 1.7 G/DL (ref 1.5–4.5)
GLUCOSE SERPL-MCNC: 90 MG/DL (ref 65–99)
HDLC SERPL-MCNC: 52 MG/DL
INTERPRETATION, 910389: NORMAL
LDLC SERPL CALC-MCNC: 69 MG/DL (ref 0–99)
POTASSIUM SERPL-SCNC: 4.4 MMOL/L (ref 3.5–5.2)
PROT SERPL-MCNC: 5.8 G/DL (ref 6–8.5)
SODIUM SERPL-SCNC: 141 MMOL/L (ref 134–144)
TRIGL SERPL-MCNC: 132 MG/DL (ref 0–149)
VLDLC SERPL CALC-MCNC: 26 MG/DL (ref 5–40)

## 2020-01-29 ENCOUNTER — OFFICE VISIT (OUTPATIENT)
Dept: CARDIOLOGY CLINIC | Age: 70
End: 2020-01-29

## 2020-01-29 DIAGNOSIS — Z95.810 AUTOMATIC IMPLANTABLE CARDIAC DEFIBRILLATOR IN SITU: Primary | ICD-10-CM

## 2020-01-30 ENCOUNTER — OP HISTORICAL/CONVERTED ENCOUNTER (OUTPATIENT)
Dept: OTHER | Age: 70
End: 2020-01-30

## 2020-02-19 DIAGNOSIS — I25.10 ATHEROSCLEROSIS OF NATIVE CORONARY ARTERY OF NATIVE HEART WITHOUT ANGINA PECTORIS: ICD-10-CM

## 2020-02-19 RX ORDER — ATORVASTATIN CALCIUM 80 MG/1
TABLET, FILM COATED ORAL
Qty: 90 TAB | Refills: 3 | Status: SHIPPED | OUTPATIENT
Start: 2020-02-19 | End: 2021-02-16

## 2020-03-30 ENCOUNTER — VIRTUAL VISIT (OUTPATIENT)
Dept: CARDIOLOGY CLINIC | Age: 70
End: 2020-03-30

## 2020-03-30 ENCOUNTER — DOCUMENTATION ONLY (OUTPATIENT)
Dept: CARDIOLOGY CLINIC | Age: 70
End: 2020-03-30

## 2020-03-30 DIAGNOSIS — I25.10 CARDIOVASCULAR DISEASE: ICD-10-CM

## 2020-03-30 DIAGNOSIS — I47.29 PAROXYSMAL VENTRICULAR TACHYCARDIA: ICD-10-CM

## 2020-03-30 DIAGNOSIS — I25.5 ISCHEMIC CARDIOMYOPATHY: Primary | ICD-10-CM

## 2020-03-30 DIAGNOSIS — E78.2 MIXED HYPERLIPIDEMIA: ICD-10-CM

## 2020-03-30 NOTE — PROGRESS NOTES
[facility] Cardiology Telemedicine Encounter                                                         Pursuant to the emergency declaration under the [facility], [facility] waiver authority and the [employer] Act, this Virtual  Visit was conducted, with patient's consent, to reduce the patient's risk of exposure to COVID-19 and provide continuity of care for an established patient. Services were provided through a video synchronous discussion virtually to substitute for in-person clinic visit. Subjective/HPI:   Malathi Godfrey is a 65 y.o. male who was seen by synchronous (real-time) audio-video technology on 3/30/2020. He is actually doing quite well. He is comfortable wearing his oxygen and even though pulmonary rehab closed because of the virus he is walking daily walk for about 2-1/2 miles. His blood pressure this am was 121/77 with a heart rate of 72 and a pulse ox of 99% on his oxygen. He did something to his back a couple weeks ago but that is getting better. Has occasional palpitations though this is nothing new. His son who was diagnosed with stage IV colon cancer last fall had some surgeries and now has a colostomy but is doing okay. His lipid profile in January looked great.     PCP Provider  Daxa Ca MD  Past Medical History:   Diagnosis Date    Automatic implantable cardiac defibrillator in situ 9/14/2010    Cardiac arrest ([address] 9/14/2010    ICD (implantable cardiac defibrillator) in place     Other and unspecified hyperlipidemia 9/14/2010    Other specified forms of chronic ischemic heart disease 9/14/2010    Paroxysmal ventricular tachycardia ([address] 9/14/2010    PAT (paroxysmal atrial tachycardia) ([address]     Unspecified cardiovascular disease 9/14/2010    Ventricular fibrillation ([address] 9/14/2010      Past Surgical History:   Procedure Laterality Date    HX CHOLECYSTECTOMY      6/07  HX HEART CATHETERIZATION      NASAL SURG PROC UNLISTED      polyps and deviated septum    IN EPHYS EVAL PACG CVDFB LDS INITIAL IMPLAN/REPLACE  10/2/2017         IN RMVL IMPLTBL DFB PLSE GEN W/RPLCMT PLSE GEN 2 LD  10/2/2017         XR FLUOROSCOPY UNDER 60 MINUTES  10/3/2012          No Known Allergies   Family History   Problem Relation Age of Onset    Heart Disease Neg Hx       Current Outpatient Medications   Medication Sig    atorvastatin (LIPITOR) 80 mg tablet TAKE 1 TABLET NIGHTLY    sotalol (BETAPACE) 120 mg tablet TAKE 1 TABLET TWICE A DAY    clopidogrel (PLAVIX) 75 mg tab TAKE 1 TABLET DAILY    cyanocobalamin (VITAMIN B12) 500 mcg tablet Take 500 mcg by mouth daily.  folic acid 788 mcg tablet Take 800 mcg by mouth daily.  cholecalciferol, vitamin D3, (VITAMIN D3 PO) Take 800 Units by mouth daily.  Oxygen 3- 6 liters continuously per nasal cannula.  Bifidobacterium Infantis (ALIGN) 4 mg cap Take 1 Tab by mouth daily.  nitroglycerin (NITROLINGUAL) 400 mcg/spray spray USE ONE SPRAY UNDER THE TONGUE EVERY 5 MINUTES AS NEEDED    predniSONE (DELTASONE) 10 mg tablet Take  by mouth daily (with breakfast).  MAGNESIUM CITRATE PO Take 1 Tab by mouth daily.  PSYLLIUM SEED (PSYLLIUM PO) Take 3 Tabs by mouth two (2) times a day.  aclidinium bromide (TUDORZA PRESSAIR) 400 mcg/actuation inhaler Take 1 Puff by inhalation two (2) times a day.  aspirin delayed-release 81 mg tablet Take  by mouth daily.  guaiFENesin (MUCINEX) 1,200 mg Ta12 ER tablet Take 1,200 mg by mouth two (2) times a day.  loratadine (CLARITIN) 10 mg tablet Take 10 mg by mouth daily.  azelastine-fluticasone (DYMISTA) 137-50 mcg/spray spry by Nasal route two (2) times a day.  fluticasone-salmeterol (ADVAIR) 500-50 mcg/dose diskus inhaler Take 1 Puff by inhalation every twelve (12) hours.  docusate sodium (COLACE) 100 mg capsule Take 100 mg by mouth daily.     tamsulosin (FLOMAX) 0.4 mg capsule Take 0.4 mg by mouth daily.  multivitamin (ONE A DAY) tablet Take 1 Tab by mouth daily.  zolpidem (AMBIEN) 10 mg tablet Take  by mouth nightly as needed. No current facility-administered medications for this visit. There were no vitals filed for this visit. Social History     Socioeconomic History    Marital status:      Spouse name: Not on file    Number of children: Not on file    Years of education: Not on file    Highest education level: Not on file   Occupational History    Not on file   Social Needs    Financial resource strain: Not on file    Food insecurity     Worry: Not on file     Inability: Not on file    Transportation needs     Medical: Not on file     Non-medical: Not on file   Tobacco Use    Smoking status: Former Smoker     Last attempt to quit: 7/12/2004     Years since quitting: 15.7    Smokeless tobacco: Never Used   Substance and Sexual Activity    Alcohol use:  Yes     Alcohol/week: 0.0 standard drinks     Comment: occasional    Drug use: No    Sexual activity: Not on file   Lifestyle    Physical activity     Days per week: Not on file     Minutes per session: Not on file    Stress: Not on file   Relationships    Social connections     Talks on phone: Not on file     Gets together: Not on file     Attends Gnosticist service: Not on file     Active member of club or organization: Not on file     Attends meetings of clubs or organizations: Not on file     Relationship status: Not on file    Intimate partner violence     Fear of current or ex partner: Not on file     Emotionally abused: Not on file     Physically abused: Not on file     Forced sexual activity: Not on file   Other Topics Concern    Not on file   Social History Narrative    Not on file       Review of Symptoms  11 systems reviewed, negative other than as stated in the HPI    Physical Exam:    Due to this being a TeleHealth evaluation, many elements of the physical examination are unable to be assessed. General: Well developed, in no acute distress, cooperative and alert. Oxygen in place  HEENT: Pupils equal/round. No marked JVD visible on video. Respiratory: No audible wheezing, no signs of respiratory distress, lips non cyanotic  Extremities:  No edema  Neuro: A&Ox3, speech clear, no facial droop, answering questions appropriately  Skin: Skin color is normal. No rashes or lesions. Non diaphoretic on visible skin during exam      Cardiology Labs:  Lab Results   Component Value Date/Time    Cholesterol, total 147 01/21/2020 09:15 AM    HDL Cholesterol 52 01/21/2020 09:15 AM    LDL, calculated 69 01/21/2020 09:15 AM    Triglyceride 132 01/21/2020 09:15 AM       Lab Results   Component Value Date/Time    Sodium 141 01/21/2020 09:15 AM    Potassium 4.4 01/21/2020 09:15 AM    Chloride 101 01/21/2020 09:15 AM    CO2 26 01/21/2020 09:15 AM    Anion gap 7 03/01/2010 09:55 AM    Glucose 90 01/21/2020 09:15 AM    BUN 15 01/21/2020 09:15 AM    Creatinine 0.73 (L) 01/21/2020 09:15 AM    BUN/Creatinine ratio 21 01/21/2020 09:15 AM    GFR est  01/21/2020 09:15 AM    GFR est non-AA 93 01/21/2020 09:15 AM    Calcium 9.4 01/21/2020 09:15 AM    Bilirubin, total 0.2 01/21/2020 09:15 AM    AST (SGOT) 17 01/21/2020 09:15 AM    Alk. phosphatase 74 01/21/2020 09:15 AM    Protein, total 5.8 (L) 01/21/2020 09:15 AM    Albumin 4.1 01/21/2020 09:15 AM    A-G Ratio 2.4 (H) 01/21/2020 09:15 AM    ALT (SGPT) 18 01/21/2020 09:15 AM         Assessment:     Diagnoses and all orders for this visit:    1. Ischemic cardiomyopathy    2. Cardiovascular disease    3. Mixed hyperlipidemia    4. Paroxysmal ventricular tachycardia ([facility]        ICD-10-CM ICD-9-CM    1. Ischemic cardiomyopathy I25.5 414.8    2. Cardiovascular disease I25.10 429.2    3. Mixed hyperlipidemia E78.2 272.2    4. Paroxysmal ventricular tachycardia (HCC) I47.2 427.1      No orders of the defined types were placed in this encounter.        Plan:   Mr. Erika Carlton is stable and well compensated at this point on a reasonable medical regimen. He does not need any refills. He has a lab slip for blood work to be done in July. We discussed the expected course, resolution and complications of the diagnosis(es) in detail. Medication risks, benefits, costs, interactions, and alternatives were discussed as indicated. I advised him to contact the office if his condition worsens, changes or fails to improve as anticipated. He expressed understanding with the diagnosis(es) and plan    Daria Yanez MD    Greater than 20 minutes was spent in direct video patient care, planning and chart review. This visit was conducted using [employer] telemedicine services.

## 2020-04-15 ENCOUNTER — DOCUMENTATION ONLY (OUTPATIENT)
Dept: CARDIOLOGY CLINIC | Age: 70
End: 2020-04-15

## 2020-04-21 ENCOUNTER — OFFICE VISIT (OUTPATIENT)
Dept: CARDIOLOGY CLINIC | Age: 70
End: 2020-04-21

## 2020-04-21 ENCOUNTER — OP HISTORICAL/CONVERTED ENCOUNTER (OUTPATIENT)
Dept: OTHER | Age: 70
End: 2020-04-21

## 2020-04-21 DIAGNOSIS — Z95.810 AUTOMATIC IMPLANTABLE CARDIAC DEFIBRILLATOR IN SITU: Primary | ICD-10-CM

## 2020-04-22 ENCOUNTER — DOCUMENTATION ONLY (OUTPATIENT)
Dept: CARDIOLOGY CLINIC | Age: 70
End: 2020-04-22

## 2020-04-22 NOTE — PROGRESS NOTES
Scheduled [employer] ICD with no interim  discharge. COVID tele visit is scheduled for 4-22-20. Appropriate atrial and ventricular sensing and capture thresholds. Since 1-29-20 there were 291 AMS episodes noted. Presenting egms are all an atrial tach (1:1 conduction); the longest episode lasted 12 seconds. No programming changes are needed.   Continue follow-up on [email or web address].

## 2020-04-23 ENCOUNTER — VIRTUAL VISIT (OUTPATIENT)
Dept: CARDIOLOGY CLINIC | Age: 70
End: 2020-04-23

## 2020-04-23 ENCOUNTER — TELEPHONE (OUTPATIENT)
Dept: CARDIOLOGY CLINIC | Age: 70
End: 2020-04-23

## 2020-04-23 VITALS
HEART RATE: 72 BPM | HEIGHT: 71 IN | BODY MASS INDEX: 25.73 KG/M2 | DIASTOLIC BLOOD PRESSURE: 64 MMHG | SYSTOLIC BLOOD PRESSURE: 119 MMHG | WEIGHT: 183.8 LBS

## 2020-04-23 DIAGNOSIS — I25.5 ISCHEMIC CARDIOMYOPATHY: ICD-10-CM

## 2020-04-23 DIAGNOSIS — I49.01 VENTRICULAR FIBRILLATION (HCC): ICD-10-CM

## 2020-04-23 DIAGNOSIS — I25.10 ATHEROSCLEROSIS OF NATIVE CORONARY ARTERY OF NATIVE HEART WITHOUT ANGINA PECTORIS: ICD-10-CM

## 2020-04-23 DIAGNOSIS — Z95.1 HX OF CABG: ICD-10-CM

## 2020-04-23 DIAGNOSIS — I47.1 PAT (PAROXYSMAL ATRIAL TACHYCARDIA) (HCC): ICD-10-CM

## 2020-04-23 DIAGNOSIS — I46.9 CARDIAC ARREST (HCC): ICD-10-CM

## 2020-04-23 DIAGNOSIS — Z95.810 AUTOMATIC IMPLANTABLE CARDIAC DEFIBRILLATOR IN SITU: Primary | ICD-10-CM

## 2020-04-23 DIAGNOSIS — I47.29 PAROXYSMAL VENTRICULAR TACHYCARDIA: ICD-10-CM

## 2020-04-23 NOTE — TELEPHONE ENCOUNTER
Left a voice message on 4/23/2020 requesting a return call to [place] patient for her VV with Dr. Vanessa Pritchard  at 2:00pm.

## 2020-04-23 NOTE — PROGRESS NOTES
Cardiac Electrophysiology VIRTUAL VISIT Note     Pursuant to the emergency declaration under the [facility], [facility] waiver authority and the [employer] Act, this Virtual  Visit was conducted, with patient's consent, to reduce the patient's risk of exposure to COVID-19 and provide continuity of care for an established patient. Services were provided through a video synchronous discussion virtually to substitute for in-person clinic visit. Subjective: Trena Chau is a 65 y.o. man who is seen virtually via synchronous video for follow up of [employer] dual chamber ICD (gen change 10/02/2017, leads 07/02/2004). Last remote check on 04/22/2020 showed proper lead & generator function. Adequate generator longevity. Since 01/29/2020, there were 291 AMS episodes noted. Presenting egms are all an atrial tach (1:1 conduction); the longest episode lasted 12 seconds. No programming changes are needed. Ischemic cardiomyopathy with last LVEF 40-45% via echo in 2018. NYHA II. Occasional palpitations, not new. Chronic stable SOB. He continues sotalol. Last ECG on 03/21/2019 showed sinus rhythm 80 bpm with frequent PVCs, QTc 410 ms. Previously in pulmonary rehab 3 times per week, but currently closed due to COVID 19. Exercises at home by walking about 2.5 miles. Occasionally SpO2 will decrease to <89% with walking, but recovers quickly. On continuous supplemental oxygen 3-4 LPM.    States BP has been well controlled. Denies bleeding issues on DAPT. Previous:  Echo (10/10/2018): LVEF 40-45%, mild to mod LVH, hypokinesis of basal-mid inferoseptal, basal-mid inferior, basal inferolateral, & basal anterolateral wall(s). RV size upper limits of normal.  LA dilated. Mild MR. Mild TR. Mild MI.   Aortic root exhibited upper limit of normal size (3.8 cm), mild dilatation of ascending aorta (4 cm).    Lexiscan cardiolite stress (10/06/2016): Inferior wall with small to mod size fixed defect. Lateral wall with small to reversible size, moderate intensity, partially reversible defect. LVEF 42%. Hx self terminating episode VT (in VF zone) on ICD in 2012. [employer] Ri[employer] ICD lead was examined under fluoroscopy. There was no evidence of lead breakdown. There is no evidence of electrical noises so far on the lead. This ICD lead is on recall. Last PCI in 2005. Out of hospital cardiac arrest at time of acute lateral MI in 2004. Known severe COPD, followed by pulmonary. 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 Primary cardiologist is Dr. Cally Orr. Gets disability from [place] due to [email or web address] exposure in [facility].        Problem List  Date Reviewed: 3/30/2020          Codes Class Noted    Abdominal aortic aneurysm (AAA) ([facility] ICD-10-CM: I71.4  ICD-9-CM: 441.4  3/30/2018    Overview Signed 3/30/2018  9:58 AM by Ric Pyle MD     3/18  4.0 cm on ct scan             Presence of -recalled ICD lead, subsequent encounter ICD-10-CM: T82.198D  ICD-9-CM: V58.89, 996.04  10/2/2017    Overview Signed 10/2/2017  5:33 PM by Loretta Clifford MD     [address] [facility] ICD lead             Lung cancer, upper [facility]) ICD-10-CM: C34.10  ICD-9-CM: 162.3  9/27/2015    Overview Signed 9/27/2015  5:49 PM by Loretta Clifford MD      3 cm left upper lobe lung cancer stage IB 9/2015             PAT (paroxysmal atrial tachycardia) ([facility] ICD-10-CM: I47.1  ICD-9-CM: 427.0  Unknown        Encounter for long-term (current) use of high-risk medication ICD-10-CM: Z79.899  ICD-9-CM: V58.69  7/27/2012    Overview Signed 7/27/2012  9:07 AM by Loretta Clifford MD     sotalol             Coronary atherosclerosis of native coronary artery ICD-10-CM: I25.10  ICD-9-CM: 414.01  4/26/2012    Overview Addendum 4/5/2017  7:00 AM by Ric Pyle MD     2/10 lexiscan cardiolyte, mostly fixed inferolateral defect.  lvef 49%  9/16 lexiscan cardiolyte, mostly fixed inferolateral defect  lvef 42%             Cardiomyopathy [facility]) ICD-10-CM: I42.9  ICD-9-CM: 425.4  4/26/2012        Cardiovascular disease ICD-10-CM: I25.10  ICD-9-CM: 429.2  9/14/2010    Overview Signed 10/13/2010 12:52 PM by Reji Bah MD     Mr. Bill Torres cardiac history dates back to 1995 when he presented with unstable angina while living in [place] and underwent balloon angioplasty-vessel unknown. In June 2004, he had a cardiac arrest associated with an acute lateral MI. Cardiac catheterization at that time showed severe two vessel coronary disease and he had stents placed in both his obtuse marginal, and a week later in his right coronary artery. In January 2005, he had recurrent symptoms in association with an abnormal stress test and had a drug-eluting stent placed within the previously stented segment of his obtuse marginal branch. Last cardiac catheterization was in July 2005, and it showed him to have stable anatomy. He had an AICD implanted in 2004 after his out of hospital arrest. Repeat cath 2/10 show instent restenosis rca, treated at that time with BARB. LVEF 2/10 40-45%. Cardiac arrest [facility]) ICD-10-CM: I46.9  ICD-9-CM: 427.5  9/14/2010    Overview Signed 10/2/2017  5:32 PM by Janel Wells MD     Secondary prevention of sudden death. The initial ICD was implanted for this in [place] when he collapsed and was in [place] in the parking lot of the [facility]             Other specified forms of chronic ischemic heart disease ICD-10-CM: I25.89  ICD-9-CM: 414.8  9/14/2010        Implantable cardioverter-defibrillator (ICD) in situ ICD-10-CM: Z95.810  ICD-9-CM: V45.02  9/14/2010    Overview Addendum 10/2/2017  5:32 PM by Janel Wells MD     Generator change 10/02/17  Dr Vita Arellano  Secondary prevention of sudden death.   The initial ICD was implanted for this in [place] when he collapsed and was in [place] in the parking lot of the [facility] Hyperlipidemia, unspecified ICD-10-CM: E78.5  ICD-9-CM: 272.4  9/14/2010        Paroxysmal ventricular tachycardia (HCC) ICD-10-CM: I47.2  ICD-9-CM: 427.1  9/14/2010        Ventricular fibrillation (HCC) ICD-10-CM: I49.01  ICD-9-CM: 427.41  9/14/2010                Current Outpatient Medications   Medication Sig Dispense Refill    atorvastatin (LIPITOR) 80 mg tablet TAKE 1 TABLET NIGHTLY 90 Tab 3    sotalol (BETAPACE) 120 mg tablet TAKE 1 TABLET TWICE A  Tab 1    clopidogrel (PLAVIX) 75 mg tab TAKE 1 TABLET DAILY 90 Tab 4    cyanocobalamin (VITAMIN B12) 500 mcg tablet Take 500 mcg by mouth daily.  folic acid 862 mcg tablet Take 800 mcg by mouth daily.  cholecalciferol, vitamin D3, (VITAMIN D3 PO) Take 800 Units by mouth daily.  Oxygen 3- 6 liters continuously per nasal cannula.  Bifidobacterium Infantis (ALIGN) 4 mg cap Take 1 Tab by mouth daily.  nitroglycerin (NITROLINGUAL) 400 mcg/spray spray USE ONE SPRAY UNDER THE TONGUE EVERY 5 MINUTES AS NEEDED 1 Bottle 3    predniSONE (DELTASONE) 10 mg tablet Take  by mouth daily (with breakfast).  MAGNESIUM CITRATE PO Take 1 Tab by mouth daily.  PSYLLIUM SEED (PSYLLIUM PO) Take 3 Tabs by mouth two (2) times a day.  aclidinium bromide (TUDORZA PRESSAIR) 400 mcg/actuation inhaler Take 1 Puff by inhalation two (2) times a day.  aspirin delayed-release 81 mg tablet Take  by mouth daily.  guaiFENesin (MUCINEX) 1,200 mg Ta12 ER tablet Take 1,200 mg by mouth two (2) times a day.  loratadine (CLARITIN) 10 mg tablet Take 10 mg by mouth daily.  azelastine-fluticasone (DYMISTA) 137-50 mcg/spray spry by Nasal route two (2) times a day.  fluticasone-salmeterol (ADVAIR) 500-50 mcg/dose diskus inhaler Take 1 Puff by inhalation every twelve (12) hours.  docusate sodium (COLACE) 100 mg capsule Take 100 mg by mouth daily.  tamsulosin (FLOMAX) 0.4 mg capsule Take 0.4 mg by mouth daily.       multivitamin (ONE A DAY) tablet Take 1 Tab by mouth daily.  zolpidem (AMBIEN) 10 mg tablet Take  by mouth nightly as needed. No Known Allergies  Past Medical History:   Diagnosis Date    Automatic implantable cardiac defibrillator in situ 9/14/2010    Cardiac arrest ([facility] 9/14/2010    ICD (implantable cardiac defibrillator) in place     Other and unspecified hyperlipidemia 9/14/2010    Other specified forms of chronic ischemic heart disease 9/14/2010    Paroxysmal ventricular tachycardia ([facility] 9/14/2010    PAT (paroxysmal atrial tachycardia) ([facility]     Unspecified cardiovascular disease 9/14/2010    Ventricular fibrillation ([facility] 9/14/2010     Past Surgical History:   Procedure Laterality Date    HX CHOLECYSTECTOMY      6/07    HX HEART CATHETERIZATION      NASAL SURG PROC UNLISTED      polyps and deviated septum    ID EPHYS EVAL PACG CVDFB LDS INITIAL IMPLAN/REPLACE  10/2/2017         ID RMVL IMPLTBL DFB PLSE GEN W/RPLCMT PLSE GEN 2 LD  10/2/2017         XR FLUOROSCOPY UNDER 60 MINUTES  10/3/2012            Social History     Tobacco Use    Smoking status: Former Smoker     Last attempt to quit: 7/12/2004     Years since quitting: 15.7    Smokeless tobacco: Never Used   Substance Use Topics    Alcohol use: Yes     Alcohol/week: 0.0 standard drinks     Comment: occasional        Review of Systems:   Constitutional: Negative for fever, chills, weight loss, malaise/fatigue. HEENT: Negative for nosebleeds, vision changes. Respiratory: Negative for cough, and wheezing. + chronic stable SOB, chronic supplemental oxygen  Cardiovascular: + occasional palpitations, no orthopnea, claudication, leg swelling, syncope, and PND. Gastrointestinal: Negative for nausea, vomiting, diarrhea, melena. Genitourinary: Negative for hematuria. Musculoskeletal: Negative for myalgias, arthralgia. Skin: Negative for rash. Heme: Does not bleed or bruise easily.    Neurological: Negative for speech change and focal weakness     Objective:     Due to this being a TeleHealth evaluation, many elements of the physical examination are unable to be assessed. General: Well developed, in no acute distress, cooperative and alert  HEENT: Pupils equal/round. No marked JVD visible on video. Wearing nasal cannula   Respiratory: No audible wheezing, no signs of respiratory distress, lips non cyanotic  Extremities:  No edema visible. Neuro: A&Ox3, speech clear, no facial droop, answering questions appropriately  Skin: Non diaphoretic on visible skin during exam      Assessment/Plan:       ICD-10-CM ICD-9-CM    1. Automatic implantable cardiac defibrillator in situ Z95.810 V45.02    2. Paroxysmal ventricular tachycardia (HCC) I47.2 427.1    3. Ischemic cardiomyopathy I25.5 414.8    4. Atherosclerosis of native coronary artery of native heart without angina pectoris I25.10 414.01    5. PAT (paroxysmal atrial tachycardia) (HCC) I47.1 427.0    6. Cardiac arrest ([facility]) I46.9 427.5    7. Ventricular fibrillation (HCC) I49.01 427.41    8. Hx of CABG Z95.1 V45.81            Last remote check of [employer] dual chamber ICD on 04/22/2020 showed proper lead & generator function. Adequate generator longevity. Since 01/29/2020, there were 291 AMS episodes noted. Presenting egms are all an atrial tach (1:1 conduction); the longest episode lasted 12 seconds. No programming changes are needed. He does not want Riata ICD lead extracted & replaced. Tolerating sotalol, occasional palpitations with PAT. Last ECG in 2019 showed normal QTc. Plan to repeat at next in-clinic visit. Chronic supplemental oxygen, able to exert himself. Due to COVID 19 restriction he is not doing pulmonary rehab now but he can walk outside      Remote ICD checks q 3 months. EP clinic follow up in 1 year. Follow up with Dr. Naya Menjivar as previously scheduled.      Future Appointments   Date Time Provider Cyrus Morales   4/23/2020  2:00 PM MD Bhavin De La Paz [id number] [facility]   7/29/2020  3:15 PM REMOTE1, [address]   9/28/2020  1:00 PM Camila Limon  [address]   11/2/2020 11:15 AM REMOTE1, [address]   2/3/2021  8:15 AM REMOTE1, [address]   5/6/2021 11:15 AM PACEMAKER3, [address]   5/6/2021 11:40 AM Roberta Gibbs  [address]       We discussed the expected course, resolution and complications of the diagnosis(es) in detail. Medication risks, benefits, costs, interactions, and alternatives were discussed as indicated. I advised him to contact the office if his condition worsens, changes or fails to improve as anticipated. He expressed understanding with the diagnosis(es) and plan. Patient was made aware and verbalized understanding that an appointment will be scheduled for them for a virtual visit and/or office visit within the above time frame. Patient understanding his/her responsibility to call and change time/date if he/she so chooses. Sher Nettles M.D.   Electrophysiology/Cardiology  [facility] [place]  [address], [address], [address]  [facility], [address]                             [address] [place] [address]  [phone]

## 2020-05-05 ENCOUNTER — OP HISTORICAL/CONVERTED ENCOUNTER (OUTPATIENT)
Dept: OTHER | Age: 70
End: 2020-05-05

## 2020-06-30 DIAGNOSIS — I49.01 VENTRICULAR FIBRILLATION (HCC): ICD-10-CM

## 2020-06-30 DIAGNOSIS — I47.29 PAROXYSMAL VENTRICULAR TACHYCARDIA: ICD-10-CM

## 2020-06-30 DIAGNOSIS — I47.1 PAT (PAROXYSMAL ATRIAL TACHYCARDIA) (HCC): ICD-10-CM

## 2020-06-30 DIAGNOSIS — E78.5 HYPERLIPIDEMIA, UNSPECIFIED HYPERLIPIDEMIA TYPE: ICD-10-CM

## 2020-06-30 DIAGNOSIS — Z95.810 ICD (IMPLANTABLE CARDIOVERTER-DEFIBRILLATOR) IN PLACE: ICD-10-CM

## 2020-06-30 DIAGNOSIS — I25.10 CARDIOVASCULAR DISEASE: ICD-10-CM

## 2020-06-30 DIAGNOSIS — I42.9 CARDIOMYOPATHY (HCC): ICD-10-CM

## 2020-06-30 DIAGNOSIS — I25.9 ISCHEMIC HEART DISEASE: ICD-10-CM

## 2020-06-30 DIAGNOSIS — I46.9 CARDIAC ARREST (HCC): ICD-10-CM

## 2020-06-30 DIAGNOSIS — I25.10 ATHEROSCLEROSIS OF NATIVE CORONARY ARTERY OF NATIVE HEART WITHOUT ANGINA PECTORIS: ICD-10-CM

## 2020-06-30 RX ORDER — NITROGLYCERIN 400 UG/1
SPRAY ORAL
Qty: 4.9 G | Refills: 11 | Status: SHIPPED | OUTPATIENT
Start: 2020-06-30

## 2020-06-30 NOTE — TELEPHONE ENCOUNTER
Requested Prescriptions     Signed Prescriptions Disp Refills    nitroglycerin (NITROLINGUAL) 400 mcg/spray spray 4.9 g 11     Sig: SPRAY ONE SPRAY UNDER THE TONGUE EVERY 5 MINUTES AS NEEDED     Authorizing Provider: Claudio Ho     Ordering User: Renita Devi    Per Dr. Simona Duggan verbal orders

## 2020-07-11 DIAGNOSIS — I46.9 CARDIAC ARREST (HCC): ICD-10-CM

## 2020-07-11 DIAGNOSIS — Z95.810 ICD (IMPLANTABLE CARDIOVERTER-DEFIBRILLATOR) IN PLACE: ICD-10-CM

## 2020-07-11 DIAGNOSIS — I47.29 PAROXYSMAL VENTRICULAR TACHYCARDIA: ICD-10-CM

## 2020-07-11 DIAGNOSIS — I42.9 CARDIOMYOPATHY, UNSPECIFIED TYPE (HCC): ICD-10-CM

## 2020-07-11 DIAGNOSIS — E78.5 HYPERLIPIDEMIA, UNSPECIFIED HYPERLIPIDEMIA TYPE: ICD-10-CM

## 2020-07-11 DIAGNOSIS — I49.01 VENTRICULAR FIBRILLATION (HCC): ICD-10-CM

## 2020-07-11 DIAGNOSIS — I25.9 ISCHEMIC HEART DISEASE: ICD-10-CM

## 2020-07-11 DIAGNOSIS — I25.10 CARDIOVASCULAR DISEASE: ICD-10-CM

## 2020-07-11 DIAGNOSIS — I47.1 PAT (PAROXYSMAL ATRIAL TACHYCARDIA) (HCC): ICD-10-CM

## 2020-07-15 RX ORDER — SOTALOL HYDROCHLORIDE 120 MG/1
TABLET ORAL
Qty: 180 TAB | Refills: 3 | Status: SHIPPED | OUTPATIENT
Start: 2020-07-15 | End: 2021-07-08

## 2020-07-22 DIAGNOSIS — E78.5 HYPERLIPIDEMIA, UNSPECIFIED HYPERLIPIDEMIA TYPE: ICD-10-CM

## 2020-07-24 ENCOUNTER — OP HISTORICAL/CONVERTED ENCOUNTER (OUTPATIENT)
Dept: OTHER | Age: 70
End: 2020-07-24

## 2020-07-29 ENCOUNTER — OFFICE VISIT (OUTPATIENT)
Dept: CARDIOLOGY CLINIC | Age: 70
End: 2020-07-29

## 2020-07-29 DIAGNOSIS — Z95.810 AUTOMATIC IMPLANTABLE CARDIAC DEFIBRILLATOR IN SITU: Primary | ICD-10-CM

## 2020-08-11 ENCOUNTER — HOSPITAL ENCOUNTER (OUTPATIENT)
Dept: LAB | Age: 70
Discharge: HOME | End: 2020-08-11
Payer: MEDICARE

## 2020-08-11 PROCEDURE — 80061 LIPID PANEL: CPT

## 2020-08-11 PROCEDURE — 36415 COLL VENOUS BLD VENIPUNCTURE: CPT

## 2020-08-11 PROCEDURE — 80053 COMPREHEN METABOLIC PANEL: CPT

## 2020-08-12 ENCOUNTER — TELEPHONE (OUTPATIENT)
Dept: CARDIOLOGY CLINIC | Age: 70
End: 2020-08-12

## 2020-08-12 DIAGNOSIS — E78.5 HYPERLIPIDEMIA, UNSPECIFIED HYPERLIPIDEMIA TYPE: Primary | ICD-10-CM

## 2020-08-12 LAB
ALBUMIN SERPL-MCNC: 4.1 G/DL (ref 3.7–4.7)
ALBUMIN/GLOB SERPL: 2.3 {RATIO} (ref 1.2–2.2)
ALP SERPL-CCNC: 81 IU/L (ref 39–117)
ALT SERPL-CCNC: 16 IU/L (ref 0–44)
AST SERPL-CCNC: 16 IU/L (ref 0–40)
BILIRUB SERPL-MCNC: 0.3 MG/DL (ref 0–1.2)
BUN SERPL-MCNC: 15 MG/DL (ref 8–27)
BUN/CREAT SERPL: 18 (ref 10–24)
CALCIUM SERPL-MCNC: 9.1 MG/DL (ref 8.6–10.2)
CHLORIDE SERPL-SCNC: 104 MMOL/L (ref 96–106)
CHOLEST SERPL-MCNC: 145 MG/DL (ref 100–199)
CO2 SERPL-SCNC: 25 MMOL/L (ref 20–29)
CREAT SERPL-MCNC: 0.85 MG/DL (ref 0.76–1.27)
GLOBULIN SER CALC-MCNC: 1.8 G/DL (ref 1.5–4.5)
GLUCOSE SERPL-MCNC: 101 MG/DL (ref 65–99)
HDLC SERPL-MCNC: 47 MG/DL
INTERPRETATION, 910389: NORMAL
LDLC SERPL CALC-MCNC: 79 MG/DL (ref 0–99)
POTASSIUM SERPL-SCNC: 4.4 MMOL/L (ref 3.5–5.2)
PROT SERPL-MCNC: 5.9 G/DL (ref 6–8.5)
SODIUM SERPL-SCNC: 141 MMOL/L (ref 134–144)
TRIGL SERPL-MCNC: 95 MG/DL (ref 0–149)
VLDLC SERPL CALC-MCNC: 19 MG/DL (ref 5–40)

## 2020-08-12 NOTE — TELEPHONE ENCOUNTER
----- Message from Xiomy Roldan MD sent at 8/12/2020  8:05 AM EDT -----  Looks great.  Stay on meds and repeat 6mos

## 2020-11-02 ENCOUNTER — OFFICE VISIT (OUTPATIENT)
Dept: CARDIOLOGY CLINIC | Age: 70
End: 2020-11-02
Payer: MEDICARE

## 2020-11-02 DIAGNOSIS — Z95.810 AUTOMATIC IMPLANTABLE CARDIAC DEFIBRILLATOR IN SITU: Primary | ICD-10-CM

## 2020-11-02 PROCEDURE — 93296 REM INTERROG EVL PM/IDS: CPT | Performed by: INTERNAL MEDICINE

## 2020-11-02 PROCEDURE — 93295 DEV INTERROG REMOTE 1/2/MLT: CPT | Performed by: INTERNAL MEDICINE

## 2020-11-17 ENCOUNTER — HOSPITAL ENCOUNTER (OUTPATIENT)
Dept: LAB | Age: 70
Discharge: HOME | End: 2020-11-17
Payer: MEDICARE

## 2020-11-17 PROCEDURE — 80061 LIPID PANEL: CPT

## 2020-11-17 PROCEDURE — 80053 COMPREHEN METABOLIC PANEL: CPT

## 2020-11-17 PROCEDURE — 36415 COLL VENOUS BLD VENIPUNCTURE: CPT

## 2020-11-18 ENCOUNTER — TELEPHONE (OUTPATIENT)
Dept: CARDIOLOGY CLINIC | Age: 70
End: 2020-11-18

## 2020-11-18 DIAGNOSIS — E78.5 HYPERLIPIDEMIA, UNSPECIFIED HYPERLIPIDEMIA TYPE: Primary | ICD-10-CM

## 2020-11-18 LAB
ALBUMIN SERPL-MCNC: 4.2 G/DL (ref 3.7–4.7)
ALBUMIN/GLOB SERPL: 3 {RATIO} (ref 1.2–2.2)
ALP SERPL-CCNC: 78 IU/L (ref 39–117)
ALT SERPL-CCNC: 16 IU/L (ref 0–44)
AST SERPL-CCNC: 16 IU/L (ref 0–40)
BILIRUB SERPL-MCNC: 0.3 MG/DL (ref 0–1.2)
BUN SERPL-MCNC: 20 MG/DL (ref 8–27)
BUN/CREAT SERPL: 22 (ref 10–24)
CALCIUM SERPL-MCNC: 9 MG/DL (ref 8.6–10.2)
CHLORIDE SERPL-SCNC: 102 MMOL/L (ref 96–106)
CHOLEST SERPL-MCNC: 132 MG/DL (ref 100–199)
CO2 SERPL-SCNC: 24 MMOL/L (ref 20–29)
CREAT SERPL-MCNC: 0.92 MG/DL (ref 0.76–1.27)
GLOBULIN SER CALC-MCNC: 1.4 G/DL (ref 1.5–4.5)
GLUCOSE SERPL-MCNC: 93 MG/DL (ref 65–99)
HDLC SERPL-MCNC: 45 MG/DL
INTERPRETATION, 910389: NORMAL
LDLC SERPL CALC-MCNC: 70 MG/DL (ref 0–99)
POTASSIUM SERPL-SCNC: 4.5 MMOL/L (ref 3.5–5.2)
PROT SERPL-MCNC: 5.6 G/DL (ref 6–8.5)
SODIUM SERPL-SCNC: 140 MMOL/L (ref 134–144)
TRIGL SERPL-MCNC: 91 MG/DL (ref 0–149)
VLDLC SERPL CALC-MCNC: 17 MG/DL (ref 5–40)

## 2020-11-18 NOTE — TELEPHONE ENCOUNTER
----- Message from Queen Jose MD sent at 11/18/2020  8:49 AM EST -----  Chol is great.  Stay on meds and repeat 6mo

## 2020-12-04 ENCOUNTER — OFFICE VISIT (OUTPATIENT)
Dept: CARDIOLOGY CLINIC | Age: 70
End: 2020-12-04
Payer: MEDICARE

## 2020-12-04 VITALS
HEART RATE: 67 BPM | OXYGEN SATURATION: 97 % | DIASTOLIC BLOOD PRESSURE: 68 MMHG | HEIGHT: 71 IN | BODY MASS INDEX: 25.76 KG/M2 | SYSTOLIC BLOOD PRESSURE: 116 MMHG | RESPIRATION RATE: 16 BRPM | WEIGHT: 184 LBS

## 2020-12-04 DIAGNOSIS — I25.5 ISCHEMIC CARDIOMYOPATHY: ICD-10-CM

## 2020-12-04 DIAGNOSIS — Z95.810 AUTOMATIC IMPLANTABLE CARDIAC DEFIBRILLATOR IN SITU: ICD-10-CM

## 2020-12-04 DIAGNOSIS — I25.10 ATHEROSCLEROSIS OF NATIVE CORONARY ARTERY OF NATIVE HEART WITHOUT ANGINA PECTORIS: Primary | ICD-10-CM

## 2020-12-04 DIAGNOSIS — E78.5 HYPERLIPIDEMIA, UNSPECIFIED HYPERLIPIDEMIA TYPE: ICD-10-CM

## 2020-12-04 PROCEDURE — G0463 HOSPITAL OUTPT CLINIC VISIT: HCPCS | Performed by: SPECIALIST

## 2020-12-04 PROCEDURE — 3017F COLORECTAL CA SCREEN DOC REV: CPT | Performed by: SPECIALIST

## 2020-12-04 PROCEDURE — G8432 DEP SCR NOT DOC, RNG: HCPCS | Performed by: SPECIALIST

## 2020-12-04 PROCEDURE — G8427 DOCREV CUR MEDS BY ELIG CLIN: HCPCS | Performed by: SPECIALIST

## 2020-12-04 PROCEDURE — 1101F PT FALLS ASSESS-DOCD LE1/YR: CPT | Performed by: SPECIALIST

## 2020-12-04 PROCEDURE — G8536 NO DOC ELDER MAL SCRN: HCPCS | Performed by: SPECIALIST

## 2020-12-04 PROCEDURE — G8419 CALC BMI OUT NRM PARAM NOF/U: HCPCS | Performed by: SPECIALIST

## 2020-12-04 PROCEDURE — 99213 OFFICE O/P EST LOW 20 MIN: CPT | Performed by: SPECIALIST

## 2020-12-04 NOTE — PROGRESS NOTES
HISTORY OF PRESENT ILLNESS  Soraya Rios is a 65 y.o. male     SUMMARY:   Problem List  Date Reviewed: 12/4/2020          Codes Class Noted    Abdominal aortic aneurysm (AAA) ([facility] ICD-10-CM: I71.4  ICD-9-CM: 441.4  3/30/2018    Overview Signed 3/30/2018  9:58 AM by Troy Bruner MD     3/18  4.0 cm on ct scan             Presence of -recalled ICD lead, subsequent encounter ICD-10-CM: T82.198D  ICD-9-CM: V58.89, 996.04  10/2/2017    Overview Signed 10/2/2017  5:33 PM by Miracle Santillan MD     [address] ICD lead             Lung cancer, upper lobe [facility]) ICD-10-CM: C34.10  ICD-9-CM: 162.3  9/27/2015    Overview Signed 9/27/2015  5:49 PM by Miracle Santillan MD      3 cm left upper lobe lung cancer stage IB 9/2015             PAT (paroxysmal atrial tachycardia) ([facility] ICD-10-CM: I47.1  ICD-9-CM: 427.0  Unknown        Encounter for long-term (current) use of high-risk medication ICD-10-CM: Z79.899  ICD-9-CM: V58.69  7/27/2012    Overview Signed 7/27/2012  9:07 AM by Miracle Santillan MD     sotalol             Coronary atherosclerosis of native coronary artery ICD-10-CM: I25.10  ICD-9-CM: 414.01  4/26/2012    Overview Addendum 4/5/2017  7:00 AM by Troy Bruner MD     2/10 lexiscan cardiolyte, mostly fixed inferolateral defect. lvef 49%  9/16 lexiscan cardiolyte, mostly fixed inferolateral defect  lvef 42%             Cardiomyopathy [facility]) ICD-10-CM: I42.9  ICD-9-CM: 425.4  4/26/2012        Cardiovascular disease ICD-10-CM: I25.10  ICD-9-CM: 429.2  9/14/2010    Overview Signed 10/13/2010 12:52 PM by Preet Stokes MD     Mr. Juan Montiel cardiac history dates back to 1995 when he presented with unstable angina while living in [place] and underwent balloon angioplasty-vessel unknown. In June 2004, he had a cardiac arrest associated with an acute lateral MI.  Cardiac catheterization at that time showed severe two vessel coronary disease and he had stents placed in both his obtuse marginal, and a week later in his right coronary artery. In January 2005, he had recurrent symptoms in association with an abnormal stress test and had a drug-eluting stent placed within the previously stented segment of his obtuse marginal branch. Last cardiac catheterization was in July 2005, and it showed him to have stable anatomy. He had an AICD implanted in 2004 after his out of hospital arrest. Repeat cath 2/10 show instent restenosis rca, treated at that time with BARB. LVEF 2/10 40-45%. Cardiac arrest [facility]) ICD-10-CM: I46.9  ICD-9-CM: 427.5  9/14/2010    Overview Signed 10/2/2017  5:32 PM by Lorraine Mays MD     Secondary prevention of sudden death. The initial ICD was implanted for this in [place] when [place] and was in [place] in the parking lot of the [facility]             Other specified forms of chronic ischemic heart disease ICD-10-CM: I25.89  ICD-9-CM: 414.8  9/14/2010        Implantable cardioverter-defibrillator (ICD) in situ ICD-10-CM: Z95.810  ICD-9-CM: V45.02  9/14/2010    Overview Addendum 10/2/2017  5:32 PM by Lorraine Mays MD     Generator change 10/02/17  Dr Abbey Rodriguez  Secondary prevention of sudden death.   The initial ICD was implanted for this in [place] when he collapsed and was [place] in the parking lot of the [facility]             Hyperlipidemia, unspecified ICD-10-CM: E78.5  ICD-9-CM: 272.4  9/14/2010        Paroxysmal ventricular tachycardia (HCC) ICD-10-CM: I47.2  ICD-9-CM: 427.1  9/14/2010        Ventricular fibrillation [facility]) ICD-10-CM: I49.01  ICD-9-CM: 427.41  9/14/2010              Current Outpatient Medications on File Prior to Visit   Medication Sig    sotalol  mg tablet TAKE 1 TABLET TWICE A DAY    nitroglycerin (NITROLINGUAL) 400 mcg/spray spray SPRAY ONE SPRAY UNDER THE TONGUE EVERY 5 MINUTES AS NEEDED    atorvastatin (LIPITOR) 80 mg tablet TAKE 1 TABLET NIGHTLY    clopidogrel (PLAVIX) 75 mg tab TAKE 1 TABLET DAILY (Patient taking differently: daily.)    cyanocobalamin (VITAMIN B12) 500 mcg tablet Take 500 mcg by mouth daily.  folic acid 629 mcg tablet Take 800 mcg by mouth daily.  cholecalciferol, vitamin D3, (VITAMIN D3 PO) Take 800 Units by mouth daily.  Oxygen 3- 6 liters continuously per nasal cannula.  Bifidobacterium Infantis (ALIGN) 4 mg cap Take 1 Tab by mouth daily.  predniSONE (DELTASONE) 10 mg tablet Take  by mouth daily (with breakfast).  MAGNESIUM CITRATE PO Take 1 Tab by mouth daily.  PSYLLIUM SEED (PSYLLIUM PO) Take 3 Tabs by mouth two (2) times a day.  aclidinium bromide (TUDORZA PRESSAIR) 400 mcg/actuation inhaler Take 1 Puff by inhalation two (2) times a day.  aspirin delayed-release 81 mg tablet Take  by mouth daily.  guaiFENesin (MUCINEX) 1,200 mg Ta12 ER tablet Take 1,200 mg by mouth two (2) times a day.  loratadine (CLARITIN) 10 mg tablet Take 10 mg by mouth daily.  azelastine-fluticasone (DYMISTA) 137-50 mcg/spray spry by Nasal route two (2) times a day.  fluticasone-salmeterol (ADVAIR) 500-50 mcg/dose diskus inhaler Take 1 Puff by inhalation every twelve (12) hours.  docusate sodium (COLACE) 100 mg capsule Take 100 mg by mouth daily.  tamsulosin (FLOMAX) 0.4 mg capsule Take 0.4 mg by mouth two (2) times a day.  multivitamin (ONE A DAY) tablet Take 1 Tab by mouth daily.  zolpidem (AMBIEN) 10 mg tablet Take  by mouth nightly as needed. No current facility-administered medications on file prior to visit. CARDIOLOGY STUDIES TO DATE:  2/10 lexiscan cardiolyte, mostly fixed inferolateral defect. lvef 49%  9/16 lexiscan cardiolyte, mostly fixed inferolateral defect  lvef 42%  10/18 echo lvef 40-45%, lae, mild mr and tr without pul htn    Chief Complaint   Patient presents with    Follow-up     HPI :  He is doing really well. He is now back in pulmonary rehab plus walking about 3-1/2 miles a day 3 days a week. He is quite comfortable as long as he is wearing his oxygen.   Recent lipid profile looked outstanding. Blood pressure is well controlled. He is going to have another CT scan after the first of the year. CARDIAC ROS:   negative for chest pain, palpitations, syncope, orthopnea, paroxysmal nocturnal dyspnea, exertional chest pressure/discomfort, claudication, lower extremity edema    Family History   Problem Relation Age of Onset    Heart Disease Neg Hx        Past Medical History:   Diagnosis Date    Automatic implantable cardiac defibrillator in situ 9/14/2010    Cardiac arrest ([address] 9/14/2010    ICD (implantable cardiac defibrillator) in place     Other and unspecified hyperlipidemia 9/14/2010    Other specified forms of chronic ischemic heart disease 9/14/2010    Paroxysmal ventricular tachycardia ([address] 9/14/2010    PAT ([facility]oxysmal atrial tachycardia) [facility]     Unspecified cardiovascular disease 9/14/2010    Ventricular fibrillation ([facility] 9/14/2010       GENERAL ROS:  A comprehensive review of systems was negative except for that written in the HPI.     Visit Vitals  /68 (BP 1 Location: Left arm, BP Patient Position: Sitting)   Pulse 67   Resp 16   Ht 5' 11\" (1.803 m)   Wt 184 lb (83.5 kg)   SpO2 97% Comment: 3 liter o2   BMI 25.66 kg/m²       Wt Readings from Last 3 Encounters:   12/04/20 184 lb (83.5 kg)   04/23/20 183 lb 12.8 oz (83.4 kg)   09/30/19 201 lb 6.4 oz (91.4 kg)            BP Readings from Last 3 Encounters:   12/04/20 116/68   04/23/20 119/64   09/30/19 110/50       PHYSICAL EXAM  General appearance: alert, cooperative, no distress, appears stated age  Neurologic: Alert and oriented X 3  Neck: supple, symmetrical, trachea midline, no adenopathy, no carotid bruit and no JVD  Lungs: clear to auscultation bilaterally, diminished breath sounds L base  Heart: regular rate and rhythm, S1, S2 normal, no murmur, click, rub or gallop  Extremities: extremities normal, atraumatic, no cyanosis or edema    Lab Results   Component Value Date/Time    Cholesterol, total 132 11/17/2020 09:12 AM    Cholesterol, total 145 08/11/2020 08:49 AM    Cholesterol, total 147 01/21/2020 09:15 AM    Cholesterol, total 131 07/25/2019 09:09 AM    Cholesterol, total 133 03/14/2019 08:56 AM    HDL Cholesterol 45 11/17/2020 09:12 AM    HDL Cholesterol 47 08/11/2020 08:49 AM    HDL Cholesterol 52 01/21/2020 09:15 AM    HDL Cholesterol 43 07/25/2019 09:09 AM    HDL Cholesterol 48 03/14/2019 08:56 AM    LDL, calculated 79 08/11/2020 08:49 AM    LDL, calculated 69 01/21/2020 09:15 AM    LDL, calculated 70 07/25/2019 09:09 AM    LDL, calculated 69 03/14/2019 08:56 AM    LDL, calculated 74 01/24/2019 08:05 AM    LDL Chol Calc ([facility]) 70 11/17/2020 09:12 AM    Triglyceride 91 11/17/2020 09:12 AM    Triglyceride 95 08/11/2020 08:49 AM    Triglyceride 132 01/21/2020 09:15 AM    Triglyceride 88 07/25/2019 09:09 AM    Triglyceride 78 03/14/2019 08:56 AM     ASSESSMENT :      He is stable and asymptomatic, well compensated on a good medical regimen and needs no cardiac testing at this time. current treatment plan is effective, no change in therapy  lab results and schedule of future lab studies reviewed with patient  reviewed diet, exercise and weight control    Encounter Diagnoses   Name Primary?  Atherosclerosis of native coronary artery of native heart without angina pectoris Yes    Ischemic cardiomyopathy     Automatic implantable cardiac defibrillator in situ     Hyperlipidemia, unspecified hyperlipidemia type      No orders of the defined types were placed in this encounter. Follow-up and Dispositions    · Return in about 6 months (around 6/4/2021). Carolina Tam MD  12/4/2020  Please note that this dictation was completed with [employer], the computer voice recognition software. Quite often unanticipated grammatical, syntax, homophones, and other interpretive errors are inadvertently transcribed by the computer software. Please disregard these errors.   Please excuse any errors that have escaped final proofreading. Thank you.

## 2021-02-11 LAB
ALBUMIN SERPL-MCNC: 4 G/DL (ref 3.7–4.7)
ALBUMIN/GLOB SERPL: 2.2 {RATIO} (ref 1.2–2.2)
ALP SERPL-CCNC: 111 IU/L (ref 39–117)
ALT SERPL-CCNC: 21 IU/L (ref 0–44)
AST SERPL-CCNC: 19 IU/L (ref 0–40)
BILIRUB SERPL-MCNC: 0.3 MG/DL (ref 0–1.2)
BUN SERPL-MCNC: 17 MG/DL (ref 8–27)
BUN/CREAT SERPL: 19 (ref 10–24)
CALCIUM SERPL-MCNC: 9.5 MG/DL (ref 8.6–10.2)
CHLORIDE SERPL-SCNC: 102 MMOL/L (ref 96–106)
CHOLEST SERPL-MCNC: 133 MG/DL (ref 100–199)
CO2 SERPL-SCNC: 25 MMOL/L (ref 20–29)
CREAT SERPL-MCNC: 0.89 MG/DL (ref 0.76–1.27)
GLOBULIN SER CALC-MCNC: 1.8 G/DL (ref 1.5–4.5)
GLUCOSE SERPL-MCNC: 115 MG/DL (ref 65–99)
HDLC SERPL-MCNC: 49 MG/DL
INTERPRETATION, 910389: NORMAL
LDLC SERPL CALC-MCNC: 66 MG/DL (ref 0–99)
POTASSIUM SERPL-SCNC: 4.6 MMOL/L (ref 3.5–5.2)
PROT SERPL-MCNC: 5.8 G/DL (ref 6–8.5)
SODIUM SERPL-SCNC: 141 MMOL/L (ref 134–144)
TRIGL SERPL-MCNC: 97 MG/DL (ref 0–149)
VLDLC SERPL CALC-MCNC: 18 MG/DL (ref 5–40)

## 2021-02-13 DIAGNOSIS — I25.10 ATHEROSCLEROSIS OF NATIVE CORONARY ARTERY OF NATIVE HEART WITHOUT ANGINA PECTORIS: ICD-10-CM

## 2021-02-15 ENCOUNTER — OFFICE VISIT (OUTPATIENT)
Dept: CARDIOLOGY CLINIC | Age: 71
End: 2021-02-15
Payer: MEDICARE

## 2021-02-15 DIAGNOSIS — Z95.810 AUTOMATIC IMPLANTABLE CARDIAC DEFIBRILLATOR IN SITU: Primary | ICD-10-CM

## 2021-02-15 PROCEDURE — 93295 DEV INTERROG REMOTE 1/2/MLT: CPT | Performed by: INTERNAL MEDICINE

## 2021-02-15 PROCEDURE — 93296 REM INTERROG EVL PM/IDS: CPT | Performed by: INTERNAL MEDICINE

## 2021-02-16 RX ORDER — ATORVASTATIN CALCIUM 80 MG/1
TABLET, FILM COATED ORAL
Qty: 90 TAB | Refills: 1 | Status: SHIPPED | OUTPATIENT
Start: 2021-02-16 | End: 2021-08-12

## 2021-02-16 NOTE — TELEPHONE ENCOUNTER
Requested Prescriptions     Signed Prescriptions Disp Refills    atorvastatin (LIPITOR) 80 mg tablet 90 Tab 1     Sig: TAKE 1 TABLET NIGHTLY     Authorizing Provider: [facility]     Ordering User: Parker Flores     Verbal order per Dr. Nash Minium.     Future Appointments   Date Time Provider Cyrus Morales   5/6/2021 11:15 AM Alayna Villegas BS AMB   5/6/2021 11:40 AM MD HOLDEN Jurado BS AMB   6/4/2021  3:00 PM MD HOLDEN Villanueva BS AMB

## 2021-03-24 DIAGNOSIS — I47.29 PAROXYSMAL VENTRICULAR TACHYCARDIA: ICD-10-CM

## 2021-03-24 DIAGNOSIS — I49.01 VENTRICULAR FIBRILLATION (HCC): ICD-10-CM

## 2021-03-24 RX ORDER — CLOPIDOGREL BISULFATE 75 MG/1
TABLET ORAL
Qty: 90 TAB | Refills: 3 | Status: SHIPPED | OUTPATIENT
Start: 2021-03-24 | End: 2022-01-18 | Stop reason: ALTCHOICE

## 2021-03-24 NOTE — TELEPHONE ENCOUNTER
Requested Prescriptions     Signed Prescriptions Disp Refills    clopidogreL (PLAVIX) 75 mg tab 90 Tab 3     Sig: TAKE 1 TABLET DAILY     Authorizing Provider: Lexie Harvey     Ordering User: Billie Diaz    Per Dr. Alyssa Russo verbal orders

## 2021-05-14 ENCOUNTER — TELEPHONE (OUTPATIENT)
Dept: CARDIOLOGY CLINIC | Age: 71
End: 2021-05-14

## 2021-05-14 DIAGNOSIS — E78.5 HYPERLIPIDEMIA, UNSPECIFIED HYPERLIPIDEMIA TYPE: Primary | ICD-10-CM

## 2021-05-14 LAB
ALBUMIN SERPL-MCNC: 4 G/DL (ref 3.7–4.7)
ALBUMIN/GLOB SERPL: 3.1 {RATIO} (ref 1.2–2.2)
ALP SERPL-CCNC: 70 IU/L (ref 39–117)
ALT SERPL-CCNC: 25 IU/L (ref 0–44)
AST SERPL-CCNC: 22 IU/L (ref 0–40)
BILIRUB SERPL-MCNC: 0.3 MG/DL (ref 0–1.2)
BUN SERPL-MCNC: 19 MG/DL (ref 8–27)
BUN/CREAT SERPL: 19 (ref 10–24)
CALCIUM SERPL-MCNC: 8.7 MG/DL (ref 8.6–10.2)
CHLORIDE SERPL-SCNC: 104 MMOL/L (ref 96–106)
CHOLEST SERPL-MCNC: 129 MG/DL (ref 100–199)
CO2 SERPL-SCNC: 26 MMOL/L (ref 20–29)
CREAT SERPL-MCNC: 0.98 MG/DL (ref 0.76–1.27)
GLOBULIN SER CALC-MCNC: 1.3 G/DL (ref 1.5–4.5)
GLUCOSE SERPL-MCNC: 118 MG/DL (ref 65–99)
HDLC SERPL-MCNC: 55 MG/DL
IMP & REVIEW OF LAB RESULTS: NORMAL
LDLC SERPL CALC-MCNC: 56 MG/DL (ref 0–99)
POTASSIUM SERPL-SCNC: 5 MMOL/L (ref 3.5–5.2)
PROT SERPL-MCNC: 5.3 G/DL (ref 6–8.5)
SODIUM SERPL-SCNC: 139 MMOL/L (ref 134–144)
TRIGL SERPL-MCNC: 99 MG/DL (ref 0–149)
VLDLC SERPL CALC-MCNC: 18 MG/DL (ref 5–40)

## 2021-05-18 ENCOUNTER — OFFICE VISIT (OUTPATIENT)
Dept: CARDIOLOGY CLINIC | Age: 71
End: 2021-05-18
Payer: MEDICARE

## 2021-05-18 ENCOUNTER — CLINICAL SUPPORT (OUTPATIENT)
Dept: CARDIOLOGY CLINIC | Age: 71
End: 2021-05-18
Payer: MEDICARE

## 2021-05-18 VITALS
BODY MASS INDEX: 24.92 KG/M2 | DIASTOLIC BLOOD PRESSURE: 64 MMHG | SYSTOLIC BLOOD PRESSURE: 118 MMHG | RESPIRATION RATE: 16 BRPM | HEIGHT: 71 IN | WEIGHT: 178 LBS | HEART RATE: 60 BPM

## 2021-05-18 DIAGNOSIS — I47.29 PAROXYSMAL VENTRICULAR TACHYCARDIA: ICD-10-CM

## 2021-05-18 DIAGNOSIS — Z95.810 AUTOMATIC IMPLANTABLE CARDIAC DEFIBRILLATOR IN SITU: Primary | ICD-10-CM

## 2021-05-18 DIAGNOSIS — I25.5 ISCHEMIC CARDIOMYOPATHY: ICD-10-CM

## 2021-05-18 DIAGNOSIS — Z79.899 ENCOUNTER FOR MONITORING SOTALOL THERAPY: ICD-10-CM

## 2021-05-18 DIAGNOSIS — C90.00 MULTIPLE MYELOMA, REMISSION STATUS UNSPECIFIED (HCC): ICD-10-CM

## 2021-05-18 DIAGNOSIS — Z51.81 ENCOUNTER FOR MONITORING SOTALOL THERAPY: ICD-10-CM

## 2021-05-18 DIAGNOSIS — I47.1 PAT (PAROXYSMAL ATRIAL TACHYCARDIA) (HCC): ICD-10-CM

## 2021-05-18 DIAGNOSIS — C34.10 MALIGNANT NEOPLASM OF UPPER LOBE OF LUNG, UNSPECIFIED LATERALITY (HCC): ICD-10-CM

## 2021-05-18 DIAGNOSIS — I50.1 HEART FAILURE, LEFT, WITH LVEF 41-49% (HCC): ICD-10-CM

## 2021-05-18 PROCEDURE — 1101F PT FALLS ASSESS-DOCD LE1/YR: CPT | Performed by: INTERNAL MEDICINE

## 2021-05-18 PROCEDURE — 99214 OFFICE O/P EST MOD 30 MIN: CPT | Performed by: INTERNAL MEDICINE

## 2021-05-18 PROCEDURE — G0463 HOSPITAL OUTPT CLINIC VISIT: HCPCS | Performed by: INTERNAL MEDICINE

## 2021-05-18 PROCEDURE — 93283 PRGRMG EVAL IMPLANTABLE DFB: CPT | Performed by: INTERNAL MEDICINE

## 2021-05-18 PROCEDURE — G8420 CALC BMI NORM PARAMETERS: HCPCS | Performed by: INTERNAL MEDICINE

## 2021-05-18 PROCEDURE — G8432 DEP SCR NOT DOC, RNG: HCPCS | Performed by: INTERNAL MEDICINE

## 2021-05-18 PROCEDURE — G8536 NO DOC ELDER MAL SCRN: HCPCS | Performed by: INTERNAL MEDICINE

## 2021-05-18 PROCEDURE — 3017F COLORECTAL CA SCREEN DOC REV: CPT | Performed by: INTERNAL MEDICINE

## 2021-05-18 PROCEDURE — 93005 ELECTROCARDIOGRAM TRACING: CPT | Performed by: INTERNAL MEDICINE

## 2021-05-18 PROCEDURE — G8427 DOCREV CUR MEDS BY ELIG CLIN: HCPCS | Performed by: INTERNAL MEDICINE

## 2021-05-18 RX ORDER — MULTIVITAMIN
TABLET ORAL
COMMUNITY
Start: 2021-05-09 | End: 2021-05-18

## 2021-05-18 RX ORDER — SULFAMETHOXAZOLE AND TRIMETHOPRIM 800; 160 MG/1; MG/1
TABLET ORAL
COMMUNITY
Start: 2021-03-25 | End: 2021-05-18

## 2021-05-18 RX ORDER — OXYCODONE HYDROCHLORIDE 5 MG/1
CAPSULE ORAL
COMMUNITY
Start: 2021-03-16 | End: 2021-05-18

## 2021-05-18 RX ORDER — DEXAMETHASONE 4 MG/1
TABLET ORAL
COMMUNITY
Start: 2021-03-25 | End: 2021-05-18

## 2021-05-18 RX ORDER — ACYCLOVIR 400 MG/1
TABLET ORAL
COMMUNITY
Start: 2021-03-25 | End: 2021-05-18

## 2021-05-18 NOTE — PROGRESS NOTES
Cardiac Electrophysiology Office VISIT Note        Subjective: Ming Thompson is a 66 y.o. man who is seen for follow up of [employer] dual chamber ICD (gen change 10/02/2017, leads 07/02/2004). Ischemic cardiomyopathy with last LVEF 40-45% via echo in 2018. NYHA II. He is on sotalol. On continuous supplemental oxygen 3-4 LPM.    He sees Dr Mary Wise for lung cancer and multiple myeloma  Tolerate chemo and no Nausea vomitting or diarrhea    Previous:  Echo (10/10/2018): LVEF 40-45%, mild to mod LVH, hypokinesis of basal-mid inferoseptal, basal-mid inferior, basal inferolateral, & basal anterolateral wall(s). RV size upper limits of normal.  LA dilated. Mild MR. Mild TR. Mild IL. Aortic root exhibited upper limit of normal size (3.8 cm), mild dilatation of ascending aorta (4 cm). Lexiscan cardiolite stress (10/06/2016): Inferior wall with small to mod size fixed defect. Lateral wall with small to reversible size, moderate intensity, partially reversible defect. LVEF 42%. Hx self terminating episode VT (in VF zone) on ICD in 2012. [employer] ICD lead was examined under fluoroscopy. There was no evidence of lead breakdown. There is no evidence of electrical noises so far on the lead. This ICD lead is on recall. Last PCI in 2005. Out of hospital cardiac arrest at time of acute lateral MI in 2004. Known severe COPD, followed by pulmonary. 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 Primary cardiologist is Dr. Citlaly Trent.     Gets disability from [place] due to [address] exposure in [facility].        Problem List  Date Reviewed: 5/18/2021          Codes Class Noted    Abdominal aortic aneurysm (AAA) [facility]) ICD-10-CM: I71.4  ICD-9-CM: 441.4  3/30/2018    Overview Signed 3/30/2018  9:58 AM by Orestes Murillo MD     3/18  4.0 cm on ct scan             Presence of -recalled ICD lead, subsequent encounter ICD-10-CM: T82.198D  ICD-9-CM: V58.89, 996.04  10/2/2017 Overview Signed 10/2/2017  5:33 PM by Jarrod Arevalo MD     RIATA ICD lead             Lung cancer, upper lobe [facility]) ICD-10-CM: C34.10  ICD-9-CM: 162.3  9/27/2015    Overview Signed 9/27/2015  5:49 PM by Jarrod Arevalo MD      3 cm left upper lobe lung cancer stage IB 9/2015             PAT (paroxysmal atrial tachycardia) ([facility] [address] ICD-10-CM: I47.1  ICD-9-CM: 427.0  Unknown        Encounter for long-term (current) use of high-risk medication ICD-10-CM: Z79.899  ICD-9-CM: V58.69  7/27/2012    Overview Signed 7/27/2012  9:07 AM by Jarrod Arevalo MD     sotalol             Coronary atherosclerosis of native coronary artery ICD-10-CM: I25.10  ICD-9-CM: 414.01  4/26/2012    Overview Addendum 4/5/2017  7:00 AM by Bushra Machado MD     2/10 lexiscan cardiolyte, mostly fixed inferolateral defect. lvef 49%  9/16 lexiscan cardiolyte, mostly fixed inferolateral defect  lvef 42%             Cardiomyopathy [facility]) ICD-10-CM: I42.9  ICD-9-CM: 425.4  4/26/2012        Cardiovascular disease ICD-10-CM: I25.10  ICD-9-CM: 429.2  9/14/2010    Overview Signed 10/13/2010 12:52 PM by Berhane Marrero MD     Mr. Kathy Valentin cardiac history dates back to 1995 when he presented with unstable angina while living in [place] and underwent balloon angioplasty-vessel unknown. In June 2004, he had a cardiac arrest associated with an acute lateral MI. Cardiac catheterization at that time showed severe two vessel coronary disease and he had stents placed in both his obtuse marginal, and a week later in his right coronary artery. In January 2005, he had recurrent symptoms in association with an abnormal stress test and had a drug-eluting stent placed within the previously stented segment of his obtuse marginal branch. Last cardiac catheterization was in July 2005, and it showed him to have stable anatomy. He had an AICD implanted in 2004 after his out of hospital arrest. Repeat cath 2/10 show instent restenosis rca, treated at that time with BARB.  LVEF 2/10 40-45%. Cardiac arrest [facility]) ICD-10-CM: I46.9  ICD-9-CM: 427.5  9/14/2010    Overview Signed 10/2/2017  5:32 PM by Justina Ayala MD     Secondary prevention of sudden death. The initial ICD was implanted for this in [place] when he collapsed and was in [place] in the parking lot of the [facility]             Other specified forms of chronic ischemic heart disease ICD-10-CM: I25.89  ICD-9-CM: 414.8  9/14/2010        Implantable cardioverter-defibrillator (ICD) in situ ICD-10-CM: Z95.810  ICD-9-CM: V45.02  9/14/2010    Overview Addendum 10/2/2017  5:32 PM by Justina Ayala MD     Generator change 10/02/17  Dr Levonne Kussmaul  Secondary prevention of sudden death. The initial ICD was implanted for this in [place] when he collapsed and was in [place] in the parking lot of the [facility]             Hyperlipidemia, unspecified ICD-10-CM: E78.5  ICD-9-CM: 272.4  9/14/2010        Paroxysmal ventricular tachycardia (HCC) ICD-10-CM: I47.2  ICD-9-CM: 427.1  9/14/2010        Ventricular fibrillation ([facility] [address] ICD-10-CM: I49.01  ICD-9-CM: 427.41  9/14/2010                Current Outpatient Medications   Medication Sig Dispense Refill    clopidogreL (PLAVIX) 75 mg tab TAKE 1 TABLET DAILY 90 Tab 3    atorvastatin (LIPITOR) 80 mg tablet TAKE 1 TABLET NIGHTLY 90 Tab 1    sotalol  mg tablet TAKE 1 TABLET TWICE A  Tab 3    nitroglycerin (NITROLINGUAL) 400 mcg/spray spray SPRAY ONE SPRAY UNDER THE TONGUE EVERY 5 MINUTES AS NEEDED 4.9 g 11    cyanocobalamin (VITAMIN B12) 500 mcg tablet Take 500 mcg by mouth daily.  folic acid 530 mcg tablet Take 800 mcg by mouth daily.  cholecalciferol, vitamin D3, (VITAMIN D3 PO) Take 800 Units by mouth daily.  Oxygen 3- 6 liters continuously per nasal cannula.  Bifidobacterium Infantis (ALIGN) 4 mg cap Take 1 Tab by mouth daily.  predniSONE (DELTASONE) 10 mg tablet Take  by mouth daily (with breakfast).  MAGNESIUM CITRATE PO Take 1 Tab by mouth daily.  PSYLLIUM SEED (PSYLLIUM PO) Take 3 Tabs by mouth two (2) times a day.  aclidinium bromide (TUDORZA PRESSAIR) 400 mcg/actuation inhaler Take 1 Puff by inhalation two (2) times a day.  aspirin delayed-release 81 mg tablet Take  by mouth daily.  guaiFENesin (MUCINEX) 1,200 mg Ta12 ER tablet Take 1,200 mg by mouth two (2) times a day.  loratadine (CLARITIN) 10 mg tablet Take 10 mg by mouth daily.  azelastine-fluticasone (DYMISTA) 137-50 mcg/spray spry by Nasal route two (2) times a day.  fluticasone-salmeterol (ADVAIR) 500-50 mcg/dose diskus inhaler Take 1 Puff by inhalation every twelve (12) hours.  docusate sodium (COLACE) 100 mg capsule Take 100 mg by mouth daily.  tamsulosin (FLOMAX) 0.4 mg capsule Take 0.4 mg by mouth two (2) times a day.  multivitamin (ONE A DAY) tablet Take 1 Tab by mouth daily.  zolpidem (AMBIEN) 10 mg tablet Take  by mouth nightly as needed.       dexAMETHasone (DECADRON) 4 mg tablet       trimethoprim-sulfamethoxazole (BACTRIM DS, SEPTRA DS) 160-800 mg per tablet       acyclovir (ZOVIRAX) 400 mg tablet       calcium-cholecalciferol, D3, (CALTRATE 600+D) tablet TAKE 1 TABLET BY MOUTH DAILY      oxyCODONE (OXYIR) 5 mg capsule TAKE 1 CAPSULE BY MOUTH EVERY 6 HOURS AS NEEDED FOR PAIN       No Known Allergies  Past Medical History:   Diagnosis Date    Automatic implantable cardiac defibrillator in situ 9/14/2010    Cardiac arrest ([facility] [address] 9/14/2010    ICD (implantable cardiac defibrillator) in place     Other and unspecified hyperlipidemia 9/14/2010    Other specified forms of chronic ischemic heart disease 9/14/2010    Paroxysmal ventricular tachycardia ([address] 9/14/2010    PAT (paroxysmal atrial tachycardia) ([facility] [address]     Unspecified cardiovascular disease 9/14/2010    Ventricular fibrillation ([address] 9/14/2010     Past Surgical History:   Procedure Laterality Date    HX CHOLECYSTECTOMY      6/07    HX HEART CATHETERIZATION      IL EPHYS EVAL PACG CVDFB LDS INITIAL IMPLAN/REPLACE  10/2/2017         [place] NASAL SURG PROC UNLISTED      polyps and deviated septum    [place] RMVL IMPLTBL DFB PLSE GEN W/RPLCMT PLSE GEN 2 LD  10/2/2017         XR FLUOROSCOPY UNDER 60 MINUTES  10/3/2012            Social History     Tobacco Use    Smoking status: Former Smoker     Quit date: 2004     Years since quittin.8    Smokeless tobacco: Never Used   Substance Use Topics    Alcohol use: Yes     Alcohol/week: 0.0 standard drinks     Comment: occasional        Review of Systems:   Constitutional: Negative for fever, chills, weight loss, malaise/fatigue. HEENT: Negative for nosebleeds, vision changes. Respiratory: Negative for cough, and wheezing. + chronic stable SOB, chronic supplemental oxygen  Cardiovascular: + occasional palpitations, no orthopnea, claudication, leg swelling, syncope, and PND. Gastrointestinal: Negative for nausea, vomiting, diarrhea, melena. Genitourinary: Negative for hematuria. Musculoskeletal: Negative for myalgias, arthralgia. Skin: Negative for rash. Heme: Does not bleed or bruise easily. Neurological: Negative for speech change and focal weakness     Objective:     Visit Vitals  /64 (BP 1 Location: Left upper arm, BP Patient Position: Sitting)   Pulse 60   Resp 16   Ht 5' 11\" (1.803 m)   Wt 178 lb (80.7 kg)   BMI 24.83 kg/m²       Constitutional: well-developed and well-nourished. No distress. Head: Normocephalic and atraumatic. Eyes: Pupils are equal, round   Neck: Neck supple. No JVD present. Cardiovascular: Normal rate, regular rhythm and normal heart sounds. Exam reveals no gallop and no friction rub. No murmur heard. Pulmonary/Chest: Effort normal and breath sounds normal. No wheezes. Abdominal: Soft, distended  Musculoskeletal: no edema and no tenderness. Neurological: alert,oriented. Skin: healed ICD pocket   Psychiatric: normal mood and affect.  Behavior is normal. Judgment and thought content normal.     ECG atrial pacing PAC  ventricular sensing  -  Nonspecific T-abnormality  QTc normal.     Assessment/Plan:       ICD-10-CM ICD-9-CM    1. Automatic implantable cardiac defibrillator in situ  Z95.810 V45.02    2. Encounter for monitoring sotalol therapy  Z51.81 V58.83 AMB POC EKG ROUTINE W/ 12 LEADS, INTER & REP    Z79.899 V58.69    3. PAT (paroxysmal atrial tachycardia) ([facility])  I47.1 427.0    4. Ischemic cardiomyopathy  I25.5 414.8    5. Malignant neoplasm of upper lobe of lung, unspecified laterality ([facility])  C34.10 162.3    6. Multiple myeloma, remission status unspecified ([facility])  C90.00 203.00    7. Paroxysmal ventricular tachycardia ([facility])  I47.2 427.1    8. Heart failure, left, with LVEF 41-49% ([facility])  I50.1 428. 1            Today check of [employer] dual chamber ICD on 04/22/2020 showed proper lead & generator function. Adequate generator longevity 5 years   He has atrial tach 592 episodes despite sotalol but no VT or VF  Need 2 D echo for LVEF again   If LVEF is low he cannot use sotalol  Amiodarone is not good for him given lung cancer  He does not want Ri[employer] ICD lead extracted & replaced. No sign of problem with fracture so far  Chronic supplemental oxygen, able to exert himself. Did not have COVID infection      Remote ICD checks q 3 months. EP clinic follow up in 1 year. Follow up with Dr. Citlaly Trent as previously scheduled. Future Appointments   Date Time Provider Cyrus Morales   5/28/2021  9:40 AM MD HOLDEN Francisco III BS AMB   8/18/2021  9:45 AM REMOTE1, KARUNA HATCH BS AMB   11/22/2021  4:45 PM REMOTE1, KARUNA HATCH BS AMB   2/28/2022 10:00 AM REMOTE1, KARUNA HATCH BS AMB   5/31/2022  9:00 AM PACEMAKER3, KARUNA HATCH BS AMB   5/31/2022  9:20 AM MD Susana Mcpherson M.D.   Electrophysiology/Cardiology  [facility] [place]  [address] [facility], [address] [place] [address]  [phone]                                        [phone]

## 2021-05-28 ENCOUNTER — OFFICE VISIT (OUTPATIENT)
Dept: CARDIOLOGY CLINIC | Age: 71
End: 2021-05-28
Payer: MEDICARE

## 2021-05-28 ENCOUNTER — ANCILLARY PROCEDURE (OUTPATIENT)
Dept: CARDIOLOGY CLINIC | Age: 71
End: 2021-05-28
Payer: MEDICARE

## 2021-05-28 VITALS
WEIGHT: 179 LBS | BODY MASS INDEX: 25.06 KG/M2 | DIASTOLIC BLOOD PRESSURE: 80 MMHG | SYSTOLIC BLOOD PRESSURE: 120 MMHG | HEIGHT: 71 IN | HEART RATE: 60 BPM | RESPIRATION RATE: 14 BRPM | OXYGEN SATURATION: 100 %

## 2021-05-28 VITALS — BODY MASS INDEX: 25.06 KG/M2 | HEIGHT: 71 IN | WEIGHT: 179 LBS

## 2021-05-28 DIAGNOSIS — C90.00 MULTIPLE MYELOMA, REMISSION STATUS UNSPECIFIED (HCC): ICD-10-CM

## 2021-05-28 DIAGNOSIS — E78.2 MIXED HYPERLIPIDEMIA: ICD-10-CM

## 2021-05-28 DIAGNOSIS — I25.10 ATHEROSCLEROSIS OF NATIVE CORONARY ARTERY OF NATIVE HEART WITHOUT ANGINA PECTORIS: Primary | ICD-10-CM

## 2021-05-28 DIAGNOSIS — I25.5 ISCHEMIC CARDIOMYOPATHY: ICD-10-CM

## 2021-05-28 DIAGNOSIS — I47.29 PAROXYSMAL VENTRICULAR TACHYCARDIA: ICD-10-CM

## 2021-05-28 DIAGNOSIS — I47.1 PAT (PAROXYSMAL ATRIAL TACHYCARDIA) (HCC): ICD-10-CM

## 2021-05-28 DIAGNOSIS — Z79.899 ENCOUNTER FOR MONITORING SOTALOL THERAPY: ICD-10-CM

## 2021-05-28 DIAGNOSIS — I50.1 HEART FAILURE, LEFT, WITH LVEF 41-49% (HCC): ICD-10-CM

## 2021-05-28 DIAGNOSIS — C34.10 MALIGNANT NEOPLASM OF UPPER LOBE OF LUNG, UNSPECIFIED LATERALITY (HCC): ICD-10-CM

## 2021-05-28 DIAGNOSIS — Z51.81 ENCOUNTER FOR MONITORING SOTALOL THERAPY: ICD-10-CM

## 2021-05-28 DIAGNOSIS — Z95.810 AUTOMATIC IMPLANTABLE CARDIAC DEFIBRILLATOR IN SITU: ICD-10-CM

## 2021-05-28 LAB
ECHO AO ASC DIAM: 3.54 CM
ECHO AO ROOT DIAM: 3.84 CM
ECHO AV AREA PEAK VELOCITY: 2.34 CM2
ECHO AV AREA VTI: 2.66 CM2
ECHO AV AREA/BSA PEAK VELOCITY: 1.2 CM2/M2
ECHO AV AREA/BSA VTI: 1.3 CM2/M2
ECHO AV MEAN GRADIENT: 2.47 MMHG
ECHO AV PEAK GRADIENT: 4.77 MMHG
ECHO AV PEAK VELOCITY: 109.22 CM/S
ECHO AV VTI: 21.93 CM
ECHO IVC PROX: 1.75 CM
ECHO LA AREA 4C: 24.58 CM2
ECHO LA MAJOR AXIS: 5.46 CM
ECHO LA MINOR AXIS: 2.72 CM
ECHO LA VOL 2C: 86.03 ML (ref 18–58)
ECHO LA VOL 4C: 86.25 ML (ref 18–58)
ECHO LA VOL BP: 93.76 ML (ref 18–58)
ECHO LA VOL/BSA BIPLANE: 46.65 ML/M2 (ref 16–28)
ECHO LA VOLUME INDEX A2C: 42.8 ML/M2 (ref 16–28)
ECHO LA VOLUME INDEX A4C: 42.91 ML/M2 (ref 16–28)
ECHO LV E' LATERAL VELOCITY: 5.83 CM/S
ECHO LV E' SEPTAL VELOCITY: 3.29 CM/S
ECHO LV EDV A2C: 108.6 ML
ECHO LV EDV A4C: 140.67 ML
ECHO LV EDV BP: 125.37 ML (ref 67–155)
ECHO LV EDV INDEX A4C: 70 ML/M2
ECHO LV EDV INDEX BP: 62.4 ML/M2
ECHO LV EDV NDEX A2C: 54 ML/M2
ECHO LV EJECTION FRACTION A2C: 54 PERCENT
ECHO LV EJECTION FRACTION A4C: 36 PERCENT
ECHO LV EJECTION FRACTION BIPLANE: 44.5 PERCENT (ref 55–100)
ECHO LV ESV A2C: 50.01 ML
ECHO LV ESV A4C: 90.1 ML
ECHO LV ESV BP: 69.57 ML (ref 22–58)
ECHO LV ESV INDEX A2C: 24.9 ML/M2
ECHO LV ESV INDEX A4C: 44.8 ML/M2
ECHO LV ESV INDEX BP: 34.6 ML/M2
ECHO LV INTERNAL DIMENSION DIASTOLIC: 5.46 CM (ref 4.2–5.9)
ECHO LV INTERNAL DIMENSION SYSTOLIC: 4.15 CM
ECHO LV IVSD: 1.34 CM (ref 0.6–1)
ECHO LV MASS 2D: 280.1 G (ref 88–224)
ECHO LV MASS INDEX 2D: 139.3 G/M2 (ref 49–115)
ECHO LV POSTERIOR WALL DIASTOLIC: 1.13 CM (ref 0.6–1)
ECHO LVOT DIAM: 2.07 CM
ECHO LVOT PEAK GRADIENT: 2.32 MMHG
ECHO LVOT PEAK VELOCITY: 76.22 CM/S
ECHO LVOT SV: 58.2 ML
ECHO LVOT VTI: 17.35 CM
ECHO MV A VELOCITY: 85.03 CM/S
ECHO MV E DECELERATION TIME (DT): 293.95 MS
ECHO MV E VELOCITY: 53.29 CM/S
ECHO MV E/A RATIO: 0.63
ECHO MV E/E' LATERAL: 9.14
ECHO MV E/E' RATIO (AVERAGED): 12.67
ECHO MV E/E' SEPTAL: 16.2
ECHO PV MAX VELOCITY: 70.51 CM/S
ECHO PV PEAK INSTANTANEOUS GRADIENT SYSTOLIC: 1.99 MMHG
ECHO PV REGURGITANT MAX VELOCITY: 167.18 CM/S
ECHO TV REGURGITANT MAX VELOCITY: 292.64 CM/S
ECHO TV REGURGITANT PEAK GRADIENT: 34.26 MMHG
LA VOL DISK BP: 89.98 ML (ref 18–58)

## 2021-05-28 PROCEDURE — 1101F PT FALLS ASSESS-DOCD LE1/YR: CPT | Performed by: SPECIALIST

## 2021-05-28 PROCEDURE — G8536 NO DOC ELDER MAL SCRN: HCPCS | Performed by: SPECIALIST

## 2021-05-28 PROCEDURE — G8420 CALC BMI NORM PARAMETERS: HCPCS | Performed by: SPECIALIST

## 2021-05-28 PROCEDURE — 99213 OFFICE O/P EST LOW 20 MIN: CPT | Performed by: SPECIALIST

## 2021-05-28 PROCEDURE — G8510 SCR DEP NEG, NO PLAN REQD: HCPCS | Performed by: SPECIALIST

## 2021-05-28 PROCEDURE — 93306 TTE W/DOPPLER COMPLETE: CPT | Performed by: SPECIALIST

## 2021-05-28 PROCEDURE — G0463 HOSPITAL OUTPT CLINIC VISIT: HCPCS | Performed by: SPECIALIST

## 2021-05-28 PROCEDURE — G8427 DOCREV CUR MEDS BY ELIG CLIN: HCPCS | Performed by: SPECIALIST

## 2021-05-28 PROCEDURE — 3017F COLORECTAL CA SCREEN DOC REV: CPT | Performed by: SPECIALIST

## 2021-05-28 RX ORDER — AMOXICILLIN 500 MG/1
TABLET, FILM COATED ORAL
COMMUNITY
Start: 2021-05-27 | End: 2021-12-07 | Stop reason: ALTCHOICE

## 2021-05-28 NOTE — PROGRESS NOTES
HISTORY OF PRESENT ILLNESS  Gaylen Ahumada is a 66 y.o. male     SUMMARY:   Problem List  Date Reviewed: 5/28/2021        Codes Class Noted    Abdominal aortic aneurysm (AAA) ([facility] ICD-10-CM: I71.4  ICD-9-CM: 441.4  3/30/2018    Overview Signed 3/30/2018  9:58 AM by Bushra Machado MD     3/18  4.0 cm on ct scan             Presence of -recalled ICD lead, subsequent encounter ICD-10-CM: T82.198D  ICD-9-CM: V58.89, 996.04  10/2/2017    Overview Signed 10/2/2017  5:33 PM by Jarrod Arevalo MD     [address] ICD lead             Lung cancer, upper lobe [facility]) ICD-10-CM: C34.10  ICD-9-CM: 162.3  9/27/2015    Overview Signed 9/27/2015  5:49 PM by Jarrod Arevalo MD      3 cm left upper lobe lung cancer stage IB 9/2015             PAT (paroxysmal atrial tachycardia) ([facility] ICD-10-CM: I47.1  ICD-9-CM: 427.0  Unknown        Encounter for long-term (current) use of high-risk medication ICD-10-CM: Z79.899  ICD-9-CM: V58.69  7/27/2012    Overview Signed 7/27/2012  9:07 AM by Jarrod Arevalo MD     sotalol             Coronary atherosclerosis of native coronary artery ICD-10-CM: I25.10  ICD-9-CM: 414.01  4/26/2012    Overview Addendum 4/5/2017  7:00 AM by Bushra Machado MD     2/10 lexiscan cardiolyte, mostly fixed inferolateral defect. lvef 49%  9/16 lexiscan cardiolyte, mostly fixed inferolateral defect  lvef 42%             Cardiomyopathy [facility]) ICD-10-CM: I42.9  ICD-9-CM: 425.4  4/26/2012        Cardiovascular disease ICD-10-CM: I25.10  ICD-9-CM: 429.2  9/14/2010    Overview Signed 10/13/2010 12:52 PM by Berhane Marrero MD     Mr. Kathy Valentin cardiac history dates back to 1995 when he presented with unstable angina while living in [place] and underwent balloon angioplasty-vessel unknown. In June 2004, he had a cardiac arrest associated with an acute lateral MI.  Cardiac catheterization at that time showed severe two vessel coronary disease and he had stents placed in both his obtuse marginal, and a week later in his right coronary artery. In January 2005, he had recurrent symptoms in association with an abnormal stress test and had a drug-eluting stent placed within the previously stented segment of his obtuse marginal branch. Last cardiac catheterization was in July 2005, and it showed him to have stable anatomy. He had an AICD implanted in 2004 after his out of hospital arrest. Repeat cath 2/10 show instent restenosis rca, treated at that time with BARB. LVEF 2/10 40-45%. Cardiac arrest [facility]) ICD-10-CM: I46.9  ICD-9-CM: 427.5  9/14/2010    Overview Signed 10/2/2017  5:32 PM by Jarrod Arevalo MD     Secondary prevention of sudden death. The initial ICD was implanted for this in [place] when he collapsed and was in [place] in the parking lot of the [facility]             Other specified forms of chronic ischemic heart disease ICD-10-CM: I25.89  ICD-9-CM: 414.8  9/14/2010        Implantable cardioverter-defibrillator (ICD) in situ ICD-10-CM: Z95.810  ICD-9-CM: V45.02  9/14/2010    Overview Addendum 10/2/2017  5:32 PM by Jarrod Arevalo MD     Generator change 10/02/17  Dr Bishnu Rouse  Secondary prevention of sudden death.   The initial ICD was implanted for this in [place] when he collapsed and was in [place] in the parking lot of the [facility]             Hyperlipidemia, unspecified ICD-10-CM: E78.5  ICD-9-CM: 272.4  9/14/2010        Paroxysmal ventricular tachycardia (HCC) ICD-10-CM: I47.2  ICD-9-CM: 427.1  9/14/2010        Ventricular fibrillation [facility]) ICD-10-CM: I49.01  ICD-9-CM: 427.41  9/14/2010              Current Outpatient Medications on File Prior to Visit   Medication Sig    amoxicillin 500 mg tab     clopidogreL (PLAVIX) 75 mg tab TAKE 1 TABLET DAILY    atorvastatin (LIPITOR) 80 mg tablet TAKE 1 TABLET NIGHTLY    sotalol  mg tablet TAKE 1 TABLET TWICE A DAY    nitroglycerin (NITROLINGUAL) 400 mcg/spray spray SPRAY ONE SPRAY UNDER THE TONGUE EVERY 5 MINUTES AS NEEDED    cyanocobalamin (VITAMIN B12) 500 mcg tablet Take 500 mcg by mouth daily.  folic acid 466 mcg tablet Take 800 mcg by mouth daily.  cholecalciferol, vitamin D3, (VITAMIN D3 PO) Take 800 Units by mouth daily.  Oxygen 3- 6 liters continuously per nasal cannula.  Bifidobacterium Infantis (ALIGN) 4 mg cap Take 1 Tab by mouth daily.  predniSONE (DELTASONE) 10 mg tablet Take  by mouth daily (with breakfast).  MAGNESIUM CITRATE PO Take 1 Tab by mouth daily.  PSYLLIUM SEED (PSYLLIUM PO) Take 3 Tabs by mouth two (2) times a day.  aclidinium bromide (TUDORZA PRESSAIR) 400 mcg/actuation inhaler Take 1 Puff by inhalation two (2) times a day.  aspirin delayed-release 81 mg tablet Take  by mouth daily.  guaiFENesin (MUCINEX) 1,200 mg Ta12 ER tablet Take 1,200 mg by mouth two (2) times a day.  loratadine (CLARITIN) 10 mg tablet Take 10 mg by mouth daily.  azelastine-fluticasone (DYMISTA) 137-50 mcg/spray spry by Nasal route two (2) times a day.  fluticasone-salmeterol (ADVAIR) 500-50 mcg/dose diskus inhaler Take 1 Puff by inhalation every twelve (12) hours.  docusate sodium (COLACE) 100 mg capsule Take 100 mg by mouth daily.  tamsulosin (FLOMAX) 0.4 mg capsule Take 0.4 mg by mouth two (2) times a day.  multivitamin (ONE A DAY) tablet Take 1 Tab by mouth daily.  zolpidem (AMBIEN) 10 mg tablet Take  by mouth nightly as needed. No current facility-administered medications on file prior to visit. CARDIOLOGY STUDIES TO DATE:  2/10 lexiscan cardiolyte, mostly fixed inferolateral defect. lvef 49%  9/16 lexiscan cardiolyte, mostly fixed inferolateral defect  lvef 42%  10/18 echo lvef 40-45%, lae, mild mr and tr without pul htn    Chief Complaint   Patient presents with    Follow-up     HPI :  Since we last met he has been diagnosed with multiple myeloma and now is on treatment for that. He had a lot of fatigue and had not been exercising but he started to feel better.   His weight is holding stable and is quite comfortable as long as he wears his oxygen. Lipid profile earlier this month looked outstanding. His echo today showed stable findings. CARDIAC ROS:   negative for chest pain, palpitations, syncope, orthopnea, paroxysmal nocturnal dyspnea, exertional chest pressure/discomfort, claudication, lower extremity edema    Family History   Problem Relation Age of Onset    Heart Disease Neg Hx        Past Medical History:   Diagnosis Date    Automatic implantable cardiac defibrillator in situ 9/14/2010    Cardiac arrest ([facility] [address] 9/14/2010    ICD (implantable cardiac defibrillator) in place     Other and unspecified hyperlipidemia 9/14/2010    Other specified forms of chronic ischemic heart disease 9/14/2010    Paroxysmal ventricular tachycardia ([address] 9/14/2010    PAT (paroxysmal atrial tachycardia) ([address]     Unspecified cardiovascular disease 9/14/2010    Ventricular fibrillation ([facility] [address] 9/14/2010       GENERAL ROS:  A comprehensive review of systems was negative except for that written in the HPI.     Visit Vitals  /80   Pulse 60   Resp 14   Ht 5' 11\" (1.803 m)   Wt 179 lb (81.2 kg)   SpO2 100%   BMI 24.97 kg/m²       Wt Readings from Last 3 Encounters:   05/28/21 179 lb (81.2 kg)   05/28/21 179 lb (81.2 kg)   05/18/21 178 lb (80.7 kg)            BP Readings from Last 3 Encounters:   05/28/21 120/80   05/18/21 118/64   12/04/20 116/68       PHYSICAL EXAM  General appearance: alert, cooperative, no distress, appears stated age  Neurologic: Alert and oriented X 3  Neck: supple, symmetrical, trachea midline, no adenopathy, no carotid bruit and no JVD  Lungs: clear to auscultation bilaterally  Heart: regular rate and rhythm, S1, S2 normal, no murmur, click, rub or gallop  Extremities: extremities normal, atraumatic, no cyanosis or edema    Lab Results   Component Value Date/Time    Cholesterol, total 129 05/13/2021 07:40 AM    Cholesterol, total 133 02/10/2021 07:42 AM    Cholesterol, total 132 11/17/2020 09:12 AM    Cholesterol, total 145 08/11/2020 08:49 AM    Cholesterol, total 147 01/21/2020 09:15 AM    HDL Cholesterol 55 05/13/2021 07:40 AM    HDL Cholesterol 49 02/10/2021 07:42 AM    HDL Cholesterol 45 11/17/2020 09:12 AM    HDL Cholesterol 47 08/11/2020 08:49 AM    HDL Cholesterol 52 01/21/2020 09:15 AM    LDL, calculated 56 05/13/2021 07:40 AM    LDL, calculated 66 02/10/2021 07:42 AM    LDL, calculated 70 11/17/2020 09:12 AM    LDL, calculated 79 08/11/2020 08:49 AM    LDL, calculated 69 01/21/2020 09:15 AM    LDL, calculated 70 07/25/2019 09:09 AM    LDL, calculated 69 03/14/2019 08:56 AM    LDL, calculated 74 01/24/2019 08:05 AM    Triglyceride 99 05/13/2021 07:40 AM    Triglyceride 97 02/10/2021 07:42 AM    Triglyceride 91 11/17/2020 09:12 AM    Triglyceride 95 08/11/2020 08:49 AM    Triglyceride 132 01/21/2020 09:15 AM     ASSESSMENT :      He is stable and asymptomatic from a cardiac standpoint, well compensated on a good medical regimen and needs no further testing at this time. current treatment plan is effective, no change in therapy  lab results and schedule of future lab studies reviewed with patient  reviewed diet, exercise and weight control    Encounter Diagnoses   Name Primary?  Atherosclerosis of native coronary artery of native heart without angina pectoris Yes    PAT (paroxysmal atrial tachycardia) (HCC)     Ischemic cardiomyopathy     Mixed hyperlipidemia     Automatic implantable cardiac defibrillator in situ      Orders Placed This Encounter    amoxicillin 500 mg tab       Follow-up and Dispositions    · Return in about 6 months (around 11/28/2021). Chapo Aguilera MD  5/28/2021  Please note that this dictation was completed with [employer], the [employer] voice recognition software. Quite often unanticipated grammatical, syntax, homophones, and other interpretive errors are inadvertently transcribed by the computer software. Please disregard these errors.   Please excuse any errors that have escaped final proofreading. Thank you.

## 2021-05-28 NOTE — PROGRESS NOTES
LVEF 45%, mild concentric LVH, grade 2 diastolic dysfunction. Mod dilated LA. Trivial mitral valve prolapse, mild MR. Patient was seen by Dr. Matt Adams today, not sure if Dr. Matt Adams reviewed these results with him at that time or not.

## 2021-06-21 ENCOUNTER — TELEPHONE (OUTPATIENT)
Dept: CARDIOLOGY CLINIC | Age: 71
End: 2021-06-21

## 2021-06-21 NOTE — TELEPHONE ENCOUNTER
[facility] is requesting cardiac clearance for patient to undergo endoscopy procedure. Clearance to include holding plavix. If okay, how many days can he hold?     Fax # [phone]

## 2021-07-08 DIAGNOSIS — E78.5 HYPERLIPIDEMIA, UNSPECIFIED HYPERLIPIDEMIA TYPE: ICD-10-CM

## 2021-07-08 DIAGNOSIS — I42.9 CARDIOMYOPATHY, UNSPECIFIED TYPE (HCC): ICD-10-CM

## 2021-07-08 DIAGNOSIS — I25.9 ISCHEMIC HEART DISEASE: ICD-10-CM

## 2021-07-08 DIAGNOSIS — I25.10 CARDIOVASCULAR DISEASE: ICD-10-CM

## 2021-07-08 DIAGNOSIS — I46.9 CARDIAC ARREST (HCC): ICD-10-CM

## 2021-07-08 DIAGNOSIS — I47.29 PAROXYSMAL VENTRICULAR TACHYCARDIA: ICD-10-CM

## 2021-07-08 DIAGNOSIS — I47.1 PAT (PAROXYSMAL ATRIAL TACHYCARDIA) (HCC): ICD-10-CM

## 2021-07-08 DIAGNOSIS — I49.01 VENTRICULAR FIBRILLATION (HCC): ICD-10-CM

## 2021-07-08 DIAGNOSIS — Z95.810 ICD (IMPLANTABLE CARDIOVERTER-DEFIBRILLATOR) IN PLACE: ICD-10-CM

## 2021-07-08 RX ORDER — SOTALOL HYDROCHLORIDE 120 MG/1
TABLET ORAL
Qty: 180 TABLET | Refills: 3 | Status: SHIPPED | OUTPATIENT
Start: 2021-07-08 | End: 2022-04-11 | Stop reason: SDUPTHER

## 2021-07-08 NOTE — TELEPHONE ENCOUNTER
Requested Prescriptions     Signed Prescriptions Disp Refills    sotaloL (BETAPACE) 120 mg tablet 180 Tablet 3     Sig: TAKE 1 TABLET TWICE A DAY     Authorizing Provider: Darci Rubio     Ordering User: Eligio Eason    Per Dr. Mona Syed verbal orders

## 2021-08-03 NOTE — TELEPHONE ENCOUNTER
----- Message from Gene Ashley MD sent at 5/14/2021  7:24 AM EDT -----  Chol is great. Sugar slightly elevated but stable.  Stay on meds and repeat 6mos MAGDIEL GUERRIER ; 10/21/2021 ; [facility] [phone]

## 2021-08-12 DIAGNOSIS — I25.10 ATHEROSCLEROSIS OF NATIVE CORONARY ARTERY OF NATIVE HEART WITHOUT ANGINA PECTORIS: ICD-10-CM

## 2021-08-12 RX ORDER — ATORVASTATIN CALCIUM 80 MG/1
TABLET, FILM COATED ORAL
Qty: 90 TABLET | Refills: 3 | Status: SHIPPED | OUTPATIENT
Start: 2021-08-12 | End: 2022-08-08

## 2021-08-12 NOTE — TELEPHONE ENCOUNTER
Requested Prescriptions     Signed Prescriptions Disp Refills    atorvastatin (LIPITOR) 80 mg tablet 90 Tablet 3     Sig: TAKE 1 TABLET NIGHTLY     Authorizing Provider: Black Briggs     Ordering User: Micheal Glover    Per Dr. Moustapha Thomas verbal orders

## 2021-08-18 ENCOUNTER — OFFICE VISIT (OUTPATIENT)
Dept: CARDIOLOGY CLINIC | Age: 71
End: 2021-08-18
Payer: MEDICARE

## 2021-08-18 DIAGNOSIS — Z95.810 ICD (IMPLANTABLE CARDIOVERTER-DEFIBRILLATOR) IN PLACE: Primary | ICD-10-CM

## 2021-08-18 PROCEDURE — 93296 REM INTERROG EVL PM/IDS: CPT | Performed by: INTERNAL MEDICINE

## 2021-08-18 NOTE — LETTER
8/18/2021 10:00 AM    Mr. Waylon Godoy  [address] [address] [address]            This letter confirms that we have received your scheduled remote check of your implanted     device on 8-18-21  . Our EP team will contact you via phone if there are significant abnormal    findings. Your next remote check from home is scheduled for 11-22-21  . If you have any questions, please call [address] [place] [address] Drive at [phone].                Sincerely,    Adelina Schumacher MD [facility]

## 2021-11-12 ENCOUNTER — TELEPHONE (OUTPATIENT)
Dept: CARDIOLOGY CLINIC | Age: 71
End: 2021-11-12

## 2021-11-12 DIAGNOSIS — E78.5 HYPERLIPIDEMIA, UNSPECIFIED HYPERLIPIDEMIA TYPE: Primary | ICD-10-CM

## 2021-11-12 LAB
ALBUMIN SERPL-MCNC: 3.9 G/DL (ref 3.7–4.7)
ALBUMIN/GLOB SERPL: 2.3 {RATIO} (ref 1.2–2.2)
ALP SERPL-CCNC: 59 IU/L (ref 44–121)
ALT SERPL-CCNC: 30 IU/L (ref 0–44)
AST SERPL-CCNC: 17 IU/L (ref 0–40)
BILIRUB SERPL-MCNC: 0.4 MG/DL (ref 0–1.2)
BUN SERPL-MCNC: 17 MG/DL (ref 8–27)
BUN/CREAT SERPL: 18 (ref 10–24)
CALCIUM SERPL-MCNC: 9.1 MG/DL (ref 8.6–10.2)
CHLORIDE SERPL-SCNC: 104 MMOL/L (ref 96–106)
CHOLEST SERPL-MCNC: 138 MG/DL (ref 100–199)
CO2 SERPL-SCNC: 26 MMOL/L (ref 20–29)
CREAT SERPL-MCNC: 0.96 MG/DL (ref 0.76–1.27)
GLOBULIN SER CALC-MCNC: 1.7 G/DL (ref 1.5–4.5)
GLUCOSE SERPL-MCNC: 117 MG/DL (ref 65–99)
HDLC SERPL-MCNC: 63 MG/DL
IMP & REVIEW OF LAB RESULTS: NORMAL
LDLC SERPL CALC-MCNC: 58 MG/DL (ref 0–99)
POTASSIUM SERPL-SCNC: 4.8 MMOL/L (ref 3.5–5.2)
PROT SERPL-MCNC: 5.6 G/DL (ref 6–8.5)
SODIUM SERPL-SCNC: 139 MMOL/L (ref 134–144)
TRIGL SERPL-MCNC: 94 MG/DL (ref 0–149)
VLDLC SERPL CALC-MCNC: 17 MG/DL (ref 5–40)

## 2021-11-12 NOTE — TELEPHONE ENCOUNTER
----- Message from Teresa Gonzalez MD sent at 11/12/2021  7:24 AM EST -----  Sugar slightly elevated but stable. Chol is great.  Stay on meds and repeat 6mos

## 2021-11-22 ENCOUNTER — OFFICE VISIT (OUTPATIENT)
Dept: CARDIOLOGY CLINIC | Age: 71
End: 2021-11-22
Payer: MEDICARE

## 2021-11-22 DIAGNOSIS — Z95.810 AUTOMATIC IMPLANTABLE CARDIAC DEFIBRILLATOR IN SITU: Primary | ICD-10-CM

## 2021-11-22 PROCEDURE — 93295 DEV INTERROG REMOTE 1/2/MLT: CPT | Performed by: INTERNAL MEDICINE

## 2021-11-22 NOTE — LETTER
11/22/2021 3:32 PM    Mr. Jud Renteria  [address] [address] [address]        This letter confirms that we have received your scheduled remote check of your implanted     device on 11-22-21  . Our EP team will contact you via phone if there are significant abnormal    findings. Your next remote check from home is scheduled for 2-28-22  . If you have any questions, please call [address] [place] [address] Drive at [phone].                Sincerely,    Jose Rea MD [facility]

## 2021-12-07 ENCOUNTER — OFFICE VISIT (OUTPATIENT)
Dept: CARDIOLOGY CLINIC | Age: 71
End: 2021-12-07
Payer: MEDICARE

## 2021-12-07 VITALS
RESPIRATION RATE: 18 BRPM | OXYGEN SATURATION: 98 % | SYSTOLIC BLOOD PRESSURE: 138 MMHG | BODY MASS INDEX: 26.18 KG/M2 | WEIGHT: 187 LBS | DIASTOLIC BLOOD PRESSURE: 78 MMHG | HEART RATE: 45 BPM | HEIGHT: 71 IN

## 2021-12-07 DIAGNOSIS — I25.10 CORONARY ARTERY DISEASE INVOLVING NATIVE CORONARY ARTERY OF NATIVE HEART WITHOUT ANGINA PECTORIS: Primary | ICD-10-CM

## 2021-12-07 DIAGNOSIS — I25.5 ISCHEMIC CARDIOMYOPATHY: ICD-10-CM

## 2021-12-07 DIAGNOSIS — E78.5 HYPERLIPIDEMIA, UNSPECIFIED HYPERLIPIDEMIA TYPE: ICD-10-CM

## 2021-12-07 DIAGNOSIS — I25.10 ATHEROSCLEROSIS OF NATIVE CORONARY ARTERY OF NATIVE HEART WITHOUT ANGINA PECTORIS: ICD-10-CM

## 2021-12-07 DIAGNOSIS — Z95.810 ICD (IMPLANTABLE CARDIOVERTER-DEFIBRILLATOR) IN PLACE: ICD-10-CM

## 2021-12-07 PROCEDURE — G8419 CALC BMI OUT NRM PARAM NOF/U: HCPCS | Performed by: SPECIALIST

## 2021-12-07 PROCEDURE — G8427 DOCREV CUR MEDS BY ELIG CLIN: HCPCS | Performed by: SPECIALIST

## 2021-12-07 PROCEDURE — 99213 OFFICE O/P EST LOW 20 MIN: CPT | Performed by: SPECIALIST

## 2021-12-07 PROCEDURE — 1101F PT FALLS ASSESS-DOCD LE1/YR: CPT | Performed by: SPECIALIST

## 2021-12-07 PROCEDURE — G8510 SCR DEP NEG, NO PLAN REQD: HCPCS | Performed by: SPECIALIST

## 2021-12-07 PROCEDURE — G0463 HOSPITAL OUTPT CLINIC VISIT: HCPCS | Performed by: SPECIALIST

## 2021-12-07 PROCEDURE — G8536 NO DOC ELDER MAL SCRN: HCPCS | Performed by: SPECIALIST

## 2021-12-07 PROCEDURE — 3017F COLORECTAL CA SCREEN DOC REV: CPT | Performed by: SPECIALIST

## 2021-12-07 RX ORDER — FLUTICASONE FUROATE AND VILANTEROL TRIFENATATE 100; 25 UG/1; UG/1
POWDER RESPIRATORY (INHALATION)
COMMUNITY
Start: 2021-11-16 | End: 2022-06-06

## 2021-12-07 RX ORDER — CYCLOBENZAPRINE HCL 10 MG
TABLET ORAL
COMMUNITY
Start: 2021-10-05

## 2021-12-07 RX ORDER — FLUOCINOLONE ACETONIDE 0.11 MG/ML
OIL AURICULAR (OTIC) AS NEEDED
COMMUNITY
Start: 2021-05-06

## 2021-12-07 RX ORDER — IPRATROPIUM BROMIDE AND ALBUTEROL SULFATE 2.5; .5 MG/3ML; MG/3ML
SOLUTION RESPIRATORY (INHALATION)
COMMUNITY
Start: 2020-10-13 | End: 2022-06-06

## 2021-12-07 RX ORDER — LINACLOTIDE 290 UG/1
290 CAPSULE, GELATIN COATED ORAL
COMMUNITY
Start: 2021-12-06

## 2021-12-07 RX ORDER — LENALIDOMIDE 20 MG/1
CAPSULE ORAL
COMMUNITY
Start: 2021-11-22 | End: 2022-06-06

## 2021-12-07 RX ORDER — ACYCLOVIR 400 MG/1
TABLET ORAL
COMMUNITY
Start: 2021-09-04

## 2021-12-07 RX ORDER — PYRIDOXINE HCL (VITAMIN B6) 100 MG
100 TABLET ORAL 2 TIMES DAILY
COMMUNITY

## 2021-12-07 RX ORDER — DEXAMETHASONE 4 MG/1
TABLET ORAL
COMMUNITY
Start: 2021-03-11

## 2021-12-07 RX ORDER — OXYCODONE HYDROCHLORIDE 5 MG/1
CAPSULE ORAL
COMMUNITY
Start: 2021-10-21

## 2021-12-07 RX ORDER — DICLOFENAC SODIUM 10 MG/G
GEL TOPICAL
COMMUNITY
Start: 2021-08-31 | End: 2022-06-06

## 2021-12-07 RX ORDER — LIDOCAINE 50 MG/G
1 PATCH TOPICAL EVERY 12 HOURS
COMMUNITY
Start: 2021-11-10

## 2021-12-07 RX ORDER — MULTIVITAMIN
1 TABLET ORAL DAILY
COMMUNITY
Start: 2021-11-08

## 2021-12-07 NOTE — PROGRESS NOTES
HISTORY OF PRESENT ILLNESS  Slim Hi is a 66 y.o. male     SUMMARY:   Problem List  Date Reviewed: 12/7/2021          Codes Class Noted    Abdominal aortic aneurysm (AAA) ([facility] ICD-10-CM: I71.4  ICD-9-CM: 441.4  3/30/2018    Overview Signed 3/30/2018  9:58 AM by Gadiel Ahumada MD     3/18  4.0 cm on ct scan             Presence of -recalled ICD lead, subsequent encounter ICD-10-CM: T82.198D  ICD-9-CM: V58.89, 996.04  10/2/2017    Overview Signed 10/2/2017  5:33 PM by Norbert Briones MD     [id number] ICD lead             Lung cancer, upper lobe [facility]) ICD-10-CM: C34.10  ICD-9-CM: 162.3  9/27/2015    Overview Signed 9/27/2015  5:49 PM by Norbert Briones MD      3 cm left upper lobe lung cancer stage IB 9/2015             PAT (paroxysmal atrial tachycardia) ([facility] ICD-10-CM: I47.1  ICD-9-CM: 427.0  Unknown        Encounter for long-term (current) use of high-risk medication ICD-10-CM: Z79.899  ICD-9-CM: V58.69  7/27/2012    Overview Signed 7/27/2012  9:07 AM by Norbert Briones MD     sotalol             Coronary atherosclerosis of native coronary artery ICD-10-CM: I25.10  ICD-9-CM: 414.01  4/26/2012    Overview Addendum 4/5/2017  7:00 AM by Gadiel Ahumada MD     2/10 lexiscan cardiolyte, mostly fixed inferolateral defect. lvef 49%  9/16 lexiscan cardiolyte, mostly fixed inferolateral defect  lvef 42%             Cardiomyopathy [facility]) ICD-10-CM: I42.9  ICD-9-CM: 425.4  4/26/2012        Cardiovascular disease ICD-10-CM: I25.10  ICD-9-CM: 429.2  9/14/2010    Overview Signed 10/13/2010 12:52 PM by Arlin Teixeira MD     Mr. Dexter Dodson cardiac history dates back to 1995 when he presented with unstable angina while living in [place] and underwent balloon angioplasty-vessel unknown. In June 2004, he had a cardiac arrest associated with an acute lateral MI.  Cardiac catheterization at that time showed severe two vessel coronary disease and he had stents placed in both his obtuse marginal, and a week later in his right coronary artery. In January 2005, he had recurrent symptoms in association with an abnormal stress test and had a drug-eluting stent placed within the previously stented segment of his obtuse marginal branch. Last cardiac catheterization was in July 2005, and it showed him to have stable anatomy. He had an AICD implanted in 2004 after his out of hospital arrest. Repeat cath 2/10 show instent restenosis rca, treated at that time with BARB. LVEF 2/10 40-45%. Cardiac arrest [facility]) ICD-10-CM: I46.9  ICD-9-CM: 427.5  9/14/2010    Overview Signed 10/2/2017  5:32 PM by Mac Schilling MD     Secondary prevention of sudden death. The initial ICD was implanted for this in [place] when [place] collapsed and was in [place] in the parking lot of the [facility]             Other specified forms of chronic ischemic heart disease ICD-10-CM: I25.89  ICD-9-CM: 414.8  9/14/2010        Implantable cardioverter-defibrillator (ICD) in situ ICD-10-CM: Z95.810  ICD-9-CM: V45.02  9/14/2010    Overview Addendum 10/2/2017  5:32 PM by Mac Schilling MD     Generator change 10/02/17  Dr Anya Kraus  Secondary prevention of sudden death. The initial ICD was implanted for this in [place] when he collapsed and was in [place] in the parking lot of the [facility]             Hyperlipidemia, unspecified ICD-10-CM: E78.5  ICD-9-CM: 272.4  9/14/2010        Paroxysmal ventricular tachycardia (HCC) ICD-10-CM: I47.2  ICD-9-CM: 427.1  9/14/2010        Ventricular fibrillation [facility]) ICD-10-CM: I49.01  ICD-9-CM: 427.41  9/14/2010              Current Outpatient Medications on File Prior to Visit   Medication Sig    acyclovir (ZOVIRAX) 400 mg tablet     calcium-cholecalciferol, D3, (CALTRATE 600+D) tablet Take 1 Tablet by mouth daily.     cyclobenzaprine (FLEXERIL) 10 mg tablet TAKE 1 TABLET BY MOUTH DAILY AS NEEDED    dexAMETHasone (DECADRON) 4 mg tablet     diclofenac (VOLTAREN) 1 % gel APPLY 1 TO 2 GRAMS TO BACK TWICE DAILY AS NEEDED    Breo Ellipta 100-25 mcg/dose inhaler     fluocinolone acetonide oiL 0.01 % drop as needed.  albuterol-ipratropium (DUO-NEB) 2.5 mg-0.5 mg/3 ml nebu     Revlimid 20 mg cap TAKE 1 CAPSULE BY MOUTH EVERY DAY FOR 21 DAYS AND THEN FOLLOW WITH A 7 DAY REST.  lidocaine (LIDODERM) 5 %     Linzess 290 mcg cap capsule     oxyCODONE (OXYIR) 5 mg capsule TAKE 1 CAPSULE BY MOUTH EVERY 6 HOURS AS NEEDED FOR PAIN    OTHER Super beet softchews    pyridoxine, vitamin B6, (Vitamin B-6) 100 mg tablet Take 100 mg by mouth daily.  atorvastatin (LIPITOR) 80 mg tablet TAKE 1 TABLET NIGHTLY    sotaloL (BETAPACE) 120 mg tablet TAKE 1 TABLET TWICE A DAY    clopidogreL (PLAVIX) 75 mg tab TAKE 1 TABLET DAILY    nitroglycerin (NITROLINGUAL) 400 mcg/spray spray SPRAY ONE SPRAY UNDER THE TONGUE EVERY 5 MINUTES AS NEEDED    cyanocobalamin (VITAMIN B12) 500 mcg tablet Take 500 mcg by mouth daily.  folic acid 127 mcg tablet Take 800 mcg by mouth daily.  cholecalciferol, vitamin D3, (VITAMIN D3 PO) Take 800 Units by mouth daily.  Oxygen 3- 6 liters continuously per nasal cannula.  Bifidobacterium Infantis (ALIGN) 4 mg cap Take 1 Tab by mouth daily.  predniSONE (DELTASONE) 10 mg tablet Take  by mouth daily (with breakfast).  MAGNESIUM CITRATE PO Take 1 Tab by mouth daily.  PSYLLIUM SEED (PSYLLIUM PO) Take 3 Tabs by mouth two (2) times a day.  aclidinium bromide (TUDORZA PRESSAIR) 400 mcg/actuation inhaler Take 1 Puff by inhalation two (2) times a day.  aspirin delayed-release 81 mg tablet Take  by mouth daily.  guaiFENesin (MUCINEX) 1,200 mg Ta12 ER tablet Take 1,200 mg by mouth two (2) times a day.  loratadine (CLARITIN) 10 mg tablet Take 10 mg by mouth daily.  azelastine-fluticasone (DYMISTA) 137-50 mcg/spray spry by Nasal route two (2) times a day.  fluticasone-salmeterol (ADVAIR) 500-50 mcg/dose diskus inhaler Take 1 Puff by inhalation every twelve (12) hours.     docusate sodium (COLACE) 100 mg capsule Take 100 mg by mouth daily.  tamsulosin (FLOMAX) 0.4 mg capsule Take 0.4 mg by mouth two (2) times a day.  multivitamin (ONE A DAY) tablet Take 1 Tab by mouth daily.  zolpidem (AMBIEN) 10 mg tablet Take  by mouth nightly as needed. No current facility-administered medications on file prior to visit. CARDIOLOGY STUDIES TO DATE:  2/10 lexiscan cardiolyte, mostly fixed inferolateral defect. lvef 49%  9/16 lexiscan cardiolyte, mostly fixed inferolateral defect  lvef 42%  10/18 echo lvef 40-45%, lae, mild mr and tr without pul htn  5/21 echo lvef 45%, lvh, lae, mild mr, pa pressure 37mm    Chief Complaint   Patient presents with    Follow-up     HPI :  He is doing just fine from a cardiac standpoint. His heart rate was recorded by the nurses at 45 but it is really more like 65. As a consequence of his myeloma he has developed compression fracture and it sounds like they cannot really do much about it except given pain medication. He is tolerating his chemo quite well and other than when he is out walking or movement about he does not need to wear oxygen.   Recent lipid profile looked great  CARDIAC ROS:   negative for chest pain, palpitations, syncope, orthopnea, paroxysmal nocturnal dyspnea, exertional chest pressure/discomfort, claudication, lower extremity edema    Family History   Problem Relation Age of Onset    Heart Disease Neg Hx        Past Medical History:   Diagnosis Date    Automatic implantable cardiac defibrillator in situ 9/14/2010    Cardiac arrest ([address] 9/14/2010    ICD (implantable cardiac defibrillator) in place     Other and unspecified hyperlipidemia 9/14/2010    Other specified forms of chronic ischemic heart disease 9/14/2010    Paroxysmal ventricular tachycardia ([address] 9/14/2010    PAT (paroxysmal atrial tachycardia) ([address]     Unspecified cardiovascular disease 9/14/2010    Ventricular fibrillation ([address] 9/14/2010       GENERAL ROS:  A comprehensive review of systems was negative except for that written in the HPI.     Visit Vitals  /78 (BP 1 Location: Right arm, BP Patient Position: Sitting, BP Cuff Size: Adult)   Pulse (!) 45   Resp 18   Ht 5' 11\" (1.803 m)   Wt 187 lb (84.8 kg)   SpO2 98%   BMI 26.08 kg/m²       Wt Readings from Last 3 Encounters:   12/07/21 187 lb (84.8 kg)   05/28/21 179 lb (81.2 kg)   05/28/21 179 lb (81.2 kg)            BP Readings from Last 3 Encounters:   12/07/21 138/78   05/28/21 120/80   05/18/21 118/64       PHYSICAL EXAM  General appearance: alert, cooperative, no distress, appears stated age  Neurologic: Alert and oriented X 3  Neck: supple, symmetrical, trachea midline, no adenopathy, no carotid bruit and no JVD  Lungs: clear to auscultation bilaterally  Heart: regular rate and rhythm, S1, S2 normal, no murmur, click, rub or gallop  Extremities: extremities normal, atraumatic, no cyanosis or edema    Lab Results   Component Value Date/Time    Cholesterol, total 138 11/11/2021 07:56 AM    Cholesterol, total 129 05/13/2021 07:40 AM    Cholesterol, total 133 02/10/2021 07:42 AM    Cholesterol, total 132 11/17/2020 09:12 AM    Cholesterol, total 145 08/11/2020 08:49 AM    HDL Cholesterol 63 11/11/2021 07:56 AM    HDL Cholesterol 55 05/13/2021 07:40 AM    HDL Cholesterol 49 02/10/2021 07:42 AM    HDL Cholesterol 45 11/17/2020 09:12 AM    HDL Cholesterol 47 08/11/2020 08:49 AM    LDL, calculated 58 11/11/2021 07:56 AM    LDL, calculated 56 05/13/2021 07:40 AM    LDL, calculated 66 02/10/2021 07:42 AM    LDL, calculated 70 11/17/2020 09:12 AM    LDL, calculated 79 08/11/2020 08:49 AM    LDL, calculated 69 01/21/2020 09:15 AM    LDL, calculated 70 07/25/2019 09:09 AM    LDL, calculated 69 03/14/2019 08:56 AM    LDL, calculated 74 01/24/2019 08:05 AM    Triglyceride 94 11/11/2021 07:56 AM    Triglyceride 99 05/13/2021 07:40 AM    Triglyceride 97 02/10/2021 07:42 AM    Triglyceride 91 11/17/2020 09:12 AM    Triglyceride 95 08/11/2020 08:49 AM     ASSESSMENT :      He is stable and I think asymptomatic from a cardiac perspective on a good medical regimen and needs no cardiac testing at this time. We gave him a lab slip for follow-up blood work. current treatment plan is effective, no change in therapy  lab results and schedule of future lab studies reviewed with patient  reviewed diet, exercise and weight control    Encounter Diagnoses   Name Primary?  Coronary artery disease involving native coronary artery of native heart without angina pectoris Yes    Atherosclerosis of native coronary artery of native heart without angina pectoris     Ischemic cardiomyopathy     ICD (implantable cardioverter-defibrillator) in place     Hyperlipidemia, unspecified hyperlipidemia type      Orders Placed This Encounter    acyclovir (ZOVIRAX) 400 mg tablet    calcium-cholecalciferol, D3, (CALTRATE 600+D) tablet    cyclobenzaprine (FLEXERIL) 10 mg tablet    dexAMETHasone (DECADRON) 4 mg tablet    diclofenac (VOLTAREN) 1 % gel    Breo Ellipta 100-25 mcg/dose inhaler    fluocinolone acetonide oiL 0.01 % drop    albuterol-ipratropium (DUO-NEB) 2.5 mg-0.5 mg/3 ml nebu    Revlimid 20 mg cap    lidocaine (LIDODERM) 5 %    Linzess 290 mcg cap capsule    oxyCODONE (OXYIR) 5 mg capsule    OTHER    pyridoxine, vitamin B6, (Vitamin B-6) 100 mg tablet       Follow-up and Dispositions    · Return in about 6 months (around 6/7/2022). Joni Willingham MD  12/7/2021  Please note that this dictation was completed with [employer], the computer voice recognition software. Quite often unanticipated grammatical, syntax, homophones, and other interpretive errors are inadvertently transcribed by the computer software. Please disregard these errors. Please excuse any errors that have escaped final proofreading. Thank you.

## 2022-01-17 ENCOUNTER — TELEPHONE (OUTPATIENT)
Dept: CARDIOLOGY CLINIC | Age: 72
End: 2022-01-17

## 2022-01-17 NOTE — TELEPHONE ENCOUNTER
[facility] with Navin Mcghee calling to follow up on cardiac clearance for colonoscopy and Upper endoscopy.     Please fax to [phone]

## 2022-01-18 ENCOUNTER — TELEPHONE (OUTPATIENT)
Dept: CARDIOLOGY CLINIC | Age: 72
End: 2022-01-18

## 2022-01-18 NOTE — TELEPHONE ENCOUNTER
Called pt. LM on  requesting call back or Sciencet message reply. Patient's med list in our system does not include eliquis so need to verify durectly with patient what he is taking. Patient called. He said he sent message to Dr. Gianni Robbisn at one time that he had clot behind his knee and was switched from plavix to eliquis 5 mg bid for that reason. Changed meds in med req. Patient verbalized understanding and denied further questions or concerns. Dr. Gianni Robbins- Marylee Postal to hold eliquis 2 days prior to procedure and resume day after?

## 2022-01-18 NOTE — TELEPHONE ENCOUNTER
[facility] called because they did receive cardiac clearance for the patient but the patient is on Eliquis not Plavix and they need another clearance for the Eliqiuis.     [phone] fax  [phone]

## 2022-02-28 ENCOUNTER — OFFICE VISIT (OUTPATIENT)
Dept: CARDIOLOGY CLINIC | Age: 72
End: 2022-02-28
Payer: MEDICARE

## 2022-02-28 DIAGNOSIS — Z95.810 ICD (IMPLANTABLE CARDIOVERTER-DEFIBRILLATOR) IN PLACE: Primary | ICD-10-CM

## 2022-02-28 PROCEDURE — 93295 DEV INTERROG REMOTE 1/2/MLT: CPT | Performed by: INTERNAL MEDICINE

## 2022-02-28 NOTE — LETTER
2/28/2022 8:50 AM    Mr. Alber Xie [address] [place] [address]              This letter confirms that we have received your scheduled remote check of your implanted     device on 2-28-22  . Our EP team will contact you via phone if there are significant abnormal    findings. Your next in-clinic device check is scheduled for 5-31-22 at 9:00am  .                   If you have any questions, please call [address] at [phone].                Sincerely,    Josy Brennan MD [facility]

## 2022-03-18 PROBLEM — I71.40 ABDOMINAL AORTIC ANEURYSM (AAA) (HCC): Status: ACTIVE | Noted: 2018-03-30

## 2022-03-20 PROBLEM — T82.198D: Status: ACTIVE | Noted: 2017-10-02

## 2022-04-11 DIAGNOSIS — I25.10 CARDIOVASCULAR DISEASE: ICD-10-CM

## 2022-04-11 DIAGNOSIS — I49.01 VENTRICULAR FIBRILLATION (HCC): ICD-10-CM

## 2022-04-11 DIAGNOSIS — I47.1 PAT (PAROXYSMAL ATRIAL TACHYCARDIA) (HCC): ICD-10-CM

## 2022-04-11 DIAGNOSIS — E78.5 HYPERLIPIDEMIA, UNSPECIFIED HYPERLIPIDEMIA TYPE: ICD-10-CM

## 2022-04-11 DIAGNOSIS — Z95.810 ICD (IMPLANTABLE CARDIOVERTER-DEFIBRILLATOR) IN PLACE: ICD-10-CM

## 2022-04-11 DIAGNOSIS — I47.29 PAROXYSMAL VENTRICULAR TACHYCARDIA: ICD-10-CM

## 2022-04-11 DIAGNOSIS — I25.9 ISCHEMIC HEART DISEASE: ICD-10-CM

## 2022-04-11 DIAGNOSIS — I42.9 CARDIOMYOPATHY, UNSPECIFIED TYPE (HCC): ICD-10-CM

## 2022-04-11 DIAGNOSIS — I46.9 CARDIAC ARREST (HCC): ICD-10-CM

## 2022-04-11 RX ORDER — SOTALOL HYDROCHLORIDE 120 MG/1
TABLET ORAL
Qty: 180 TABLET | Refills: 1 | Status: SHIPPED | OUTPATIENT
Start: 2022-04-11

## 2022-04-11 NOTE — TELEPHONE ENCOUNTER
Requested Prescriptions     Signed Prescriptions Disp Refills    sotaloL (BETAPACE) 120 mg tablet 180 Tablet 1     Sig: TAKE 1 TABLET TWICE A DAY     Authorizing Provider: Tammy Ruff     Ordering User: Maximino Rater    Per Dr. Lupis Jimenez verbal orders

## 2022-04-25 ENCOUNTER — HOSPITAL ENCOUNTER (OUTPATIENT)
Age: 72
Setting detail: OUTPATIENT SURGERY
Discharge: HOME | End: 2022-04-25
Attending: INTERNAL MEDICINE | Admitting: INTERNAL MEDICINE
Payer: MEDICARE

## 2022-04-25 ENCOUNTER — ANESTHESIA (OUTPATIENT)
Dept: ENDOSCOPY | Age: 72
End: 2022-04-25
Payer: MEDICARE

## 2022-04-25 ENCOUNTER — ANESTHESIA EVENT (OUTPATIENT)
Dept: ENDOSCOPY | Age: 72
End: 2022-04-25
Payer: MEDICARE

## 2022-04-25 VITALS
OXYGEN SATURATION: 98 % | WEIGHT: 185 LBS | HEIGHT: 70 IN | HEART RATE: 64 BPM | SYSTOLIC BLOOD PRESSURE: 125 MMHG | TEMPERATURE: 98.4 F | RESPIRATION RATE: 18 BRPM | DIASTOLIC BLOOD PRESSURE: 70 MMHG | BODY MASS INDEX: 26.48 KG/M2

## 2022-04-25 PROCEDURE — 2709999900 HC NON-CHARGEABLE SUPPLY: Performed by: INTERNAL MEDICINE

## 2022-04-25 PROCEDURE — 74011250636 HC RX REV CODE- 250/636: Performed by: NURSE ANESTHETIST, CERTIFIED REGISTERED

## 2022-04-25 PROCEDURE — 77030013992 HC SNR POLYP ENDOSC BSC -B: Performed by: INTERNAL MEDICINE

## 2022-04-25 PROCEDURE — 88305 TISSUE EXAM BY PATHOLOGIST: CPT

## 2022-04-25 PROCEDURE — 77030021593 HC FCPS BIOP ENDOSC BSC -A: Performed by: INTERNAL MEDICINE

## 2022-04-25 PROCEDURE — 76040000007: Performed by: INTERNAL MEDICINE

## 2022-04-25 PROCEDURE — 74011000250 HC RX REV CODE- 250: Performed by: NURSE ANESTHETIST, CERTIFIED REGISTERED

## 2022-04-25 PROCEDURE — 76060000032 HC ANESTHESIA 0.5 TO 1 HR: Performed by: INTERNAL MEDICINE

## 2022-04-25 RX ORDER — NALOXONE HYDROCHLORIDE 0.4 MG/ML
0.4 INJECTION, SOLUTION INTRAMUSCULAR; INTRAVENOUS; SUBCUTANEOUS
Status: DISCONTINUED | OUTPATIENT
Start: 2022-04-25 | End: 2022-04-25 | Stop reason: HOSPADM

## 2022-04-25 RX ORDER — SODIUM CHLORIDE 0.9 % (FLUSH) 0.9 %
5-40 SYRINGE (ML) INJECTION AS NEEDED
Status: DISCONTINUED | OUTPATIENT
Start: 2022-04-25 | End: 2022-04-25 | Stop reason: HOSPADM

## 2022-04-25 RX ORDER — LIDOCAINE HYDROCHLORIDE 20 MG/ML
INJECTION, SOLUTION EPIDURAL; INFILTRATION; INTRACAUDAL; PERINEURAL AS NEEDED
Status: DISCONTINUED | OUTPATIENT
Start: 2022-04-25 | End: 2022-04-25 | Stop reason: HOSPADM

## 2022-04-25 RX ORDER — DEXTROMETHORPHAN/PSEUDOEPHED 2.5-7.5/.8
1.2 DROPS ORAL
Status: DISCONTINUED | OUTPATIENT
Start: 2022-04-25 | End: 2022-04-25 | Stop reason: HOSPADM

## 2022-04-25 RX ORDER — SODIUM CHLORIDE 9 MG/ML
INJECTION, SOLUTION INTRAVENOUS
Status: DISCONTINUED | OUTPATIENT
Start: 2022-04-25 | End: 2022-04-25 | Stop reason: HOSPADM

## 2022-04-25 RX ORDER — PROPOFOL 10 MG/ML
INJECTION, EMULSION INTRAVENOUS AS NEEDED
Status: DISCONTINUED | OUTPATIENT
Start: 2022-04-25 | End: 2022-04-25 | Stop reason: HOSPADM

## 2022-04-25 RX ORDER — ATROPINE SULFATE 0.1 MG/ML
0.5 INJECTION INTRAVENOUS
Status: DISCONTINUED | OUTPATIENT
Start: 2022-04-25 | End: 2022-04-25 | Stop reason: HOSPADM

## 2022-04-25 RX ORDER — SODIUM CHLORIDE 0.9 % (FLUSH) 0.9 %
5-40 SYRINGE (ML) INJECTION EVERY 8 HOURS
Status: DISCONTINUED | OUTPATIENT
Start: 2022-04-25 | End: 2022-04-25 | Stop reason: HOSPADM

## 2022-04-25 RX ORDER — FENTANYL CITRATE 50 UG/ML
100 INJECTION, SOLUTION INTRAMUSCULAR; INTRAVENOUS
Status: DISCONTINUED | OUTPATIENT
Start: 2022-04-25 | End: 2022-04-25 | Stop reason: HOSPADM

## 2022-04-25 RX ORDER — FLUMAZENIL 0.1 MG/ML
0.2 INJECTION INTRAVENOUS
Status: DISCONTINUED | OUTPATIENT
Start: 2022-04-25 | End: 2022-04-25 | Stop reason: HOSPADM

## 2022-04-25 RX ORDER — EPINEPHRINE 0.1 MG/ML
1 INJECTION INTRACARDIAC; INTRAVENOUS
Status: DISCONTINUED | OUTPATIENT
Start: 2022-04-25 | End: 2022-04-25 | Stop reason: HOSPADM

## 2022-04-25 RX ORDER — PHENYLEPHRINE HCL IN 0.9% NACL 0.4MG/10ML
SYRINGE (ML) INTRAVENOUS AS NEEDED
Status: DISCONTINUED | OUTPATIENT
Start: 2022-04-25 | End: 2022-04-25 | Stop reason: HOSPADM

## 2022-04-25 RX ORDER — MIDAZOLAM HYDROCHLORIDE 1 MG/ML
.25-1 INJECTION, SOLUTION INTRAMUSCULAR; INTRAVENOUS
Status: DISCONTINUED | OUTPATIENT
Start: 2022-04-25 | End: 2022-04-25 | Stop reason: HOSPADM

## 2022-04-25 RX ORDER — SODIUM CHLORIDE 9 MG/ML
50 INJECTION, SOLUTION INTRAVENOUS CONTINUOUS
Status: DISCONTINUED | OUTPATIENT
Start: 2022-04-25 | End: 2022-04-25 | Stop reason: HOSPADM

## 2022-04-25 RX ADMIN — PROPOFOL 30 MG: 10 INJECTION, EMULSION INTRAVENOUS at 10:15

## 2022-04-25 RX ADMIN — PROPOFOL 30 MG: 10 INJECTION, EMULSION INTRAVENOUS at 10:11

## 2022-04-25 RX ADMIN — PROPOFOL 50 MG: 10 INJECTION, EMULSION INTRAVENOUS at 09:51

## 2022-04-25 RX ADMIN — Medication 80 MCG: at 09:49

## 2022-04-25 RX ADMIN — PROPOFOL 50 MG: 10 INJECTION, EMULSION INTRAVENOUS at 09:41

## 2022-04-25 RX ADMIN — PROPOFOL 50 MG: 10 INJECTION, EMULSION INTRAVENOUS at 09:47

## 2022-04-25 RX ADMIN — PROPOFOL 20 MG: 10 INJECTION, EMULSION INTRAVENOUS at 10:01

## 2022-04-25 RX ADMIN — Medication 80 MCG: at 09:51

## 2022-04-25 RX ADMIN — PROPOFOL 50 MG: 10 INJECTION, EMULSION INTRAVENOUS at 09:44

## 2022-04-25 RX ADMIN — PROPOFOL 20 MG: 10 INJECTION, EMULSION INTRAVENOUS at 09:56

## 2022-04-25 RX ADMIN — LIDOCAINE HYDROCHLORIDE 100 MG: 20 INJECTION, SOLUTION EPIDURAL; INFILTRATION; INTRACAUDAL; PERINEURAL at 09:41

## 2022-04-25 RX ADMIN — Medication 80 MCG: at 10:03

## 2022-04-25 RX ADMIN — Medication 80 MCG: at 09:58

## 2022-04-25 RX ADMIN — PROPOFOL 20 MG: 10 INJECTION, EMULSION INTRAVENOUS at 10:03

## 2022-04-25 RX ADMIN — Medication 80 MCG: at 09:56

## 2022-04-25 RX ADMIN — PROPOFOL 30 MG: 10 INJECTION, EMULSION INTRAVENOUS at 10:08

## 2022-04-25 RX ADMIN — SODIUM CHLORIDE: 900 INJECTION, SOLUTION INTRAVENOUS at 09:33

## 2022-04-25 NOTE — PROCEDURES
[address]  [address] [facility] [address] [place] [address], [address]  [phone]                           Colonoscopy and EGD Procedure Note      Indications:  GERD, personal history polyps, family history colon cancer (son)      :  Mariana Ochoa MD    Staff: Endoscopy Technician-1: Leonor Tavera  Endoscopy RN-1: Bronwyn Hernandez RN    Referring Provider: Candelaria Quispe MD    Sedation:  MAC anesthesia    Procedure Details:  After informed consent was obtained with all risks and benefits of procedure explained and pre-operative exam completed, pt was placed in the left lateral decubitus position. Following sequential administration of sedation as per above, the gastroscope was inserted into the mouth and advanced under direct vision to second portion of the duodenum. A careful inspection was made as the gastroscope was withdrawn, including a retroflexed view of the proximal stomach; findings and interventions are described below. EGD Findings:  Esophagus:normal mucosa, irregular Z-line (C0M1), biopsied   Stomach: mild diffuse gastritis, biopsied  Duodenum/jejunum:normal    EGD Interventions:  biopsies    The bed was then turned and upon sequential sedation as per above, a digital rectal exam was performed per below. The Olympus videocolonoscope was inserted in the rectum and carefully advanced to the cecum, which was identified by the ileocecal valve and appendiceal orifice. The quality of preparation was poor. [place] Bowel Prep Score 1/1/1. The colonoscope was slowly withdrawn with careful evaluation between folds. Retroflexion in the rectum was performed.      Colon Findings:   Rectum: semi-liquid stool   Sigmoid: semi-liquid stool   Descending Colon: semi-liquid stool   Transverse Colon: semi-liquid stool   Ascending Colon: semi-liquid stool, one 6 mm sessile polyp removed with cold snare   Cecum: semi-liquid stool     Colonoscopy Interventions:  none           Specimens Removed:    ID Type Source Tests Collected by Time Destination   1 : Gastric biopsies Preservative Gastric  Jaden Salter MD 4/25/2022 9160 Pathology   2 : [place] biopsies Preservative GE Junction  Jaden Salter MD 4/25/2022 1484 Pathology   3 : Right Colon Polyp Preservative Colon, Right  Jaden Salter MD 4/25/2022 1014 Pathology       Complications: None. EBL:      Impression:    See Postoperative diagnosis above    Recommendations:   - Await pathology. You should receive a letter within 2 weeks. - Resume normal medications.  - Recommend repeat colonoscopy in 6 months with double prep. Discharge Disposition:  Home in the company of a  when able to ambulate.     Joseph Fernández MD  4/25/2022  10:28 AM

## 2022-04-25 NOTE — PERIOP NOTES

## 2022-04-25 NOTE — ANESTHESIA PREPROCEDURE EVALUATION
Relevant Problems   No relevant active problems       Anesthetic History               Review of Systems / Medical History  Patient summary reviewed, nursing notes reviewed and pertinent labs reviewed    Pulmonary                Comments:  3 cm left upper lobe lung cancer stage IB 9/2015   Neuro/Psych              Cardiovascular          CHF: NYHA Classification II  Dysrhythmias   Pacemaker and CAD      Comments: Calculated LVEF is 45%   GI/Hepatic/Renal                Endo/Other             Other Findings   Comments: Hx anticoag         Physical Exam    Airway  Mallampati: I  TM Distance: > 6 cm  Neck ROM: normal range of motion   Mouth opening: Normal     Cardiovascular    Rhythm: regular           Dental  No notable dental hx       Pulmonary  Breath sounds clear to auscultation               Abdominal         Other Findings            Anesthetic Plan    ASA: 3  Anesthesia type: MAC          Induction: Intravenous  Anesthetic plan and risks discussed with: Patient

## 2022-04-25 NOTE — DISCHARGE INSTRUCTIONS
118 [facility].  217 [facility] [address], [address] [facility]                                  Leah Gutierrez  [id number]  1950    It was my pleasure seeing you for your procedure. You will also receive a summary report with the findings from this procedure and any further recommendations. If you had polyps removed or biopsies taken during your procedure, you will receive a separate letter from me within the next 2 weeks. If you don't receive this letter or if you have any questions, please call my office [phone]. Please take note of the post procedure instructions listed below. Best Wishes,    Dr. Margarita Verdugo    These instructions give you information on caring for yourself after your procedure. Call your doctor if you have any problems or questions after your procedure. HOME CARE  · Walk if you have belly cramping or gas. Walking will help get rid of the air and reduce the bloated feeling in your belly (abdomen). · Your IV site (where you received drugs) may be tender to touch. Place warm towels on the site; keep your arm up on two pillows if you have any swelling or soreness in the area. · You may shower. ACTIVITY:  · Take frequent rest periods and move at a slower pace for the next 24 hours. .  · You may resume your regular activity tomorrow if you are feeling back to normal.  · Do not drive or ride a bicycle for at least 24 hours (because of the medicine (anesthesia) used during the test). · Do not sign any important legal documents or use or operate any machinery for 24 hours  · Do not take sleeping medicines/nerve drugs for 24 hours unless the doctor tells you. · You can return to work/school tomorrow unless otherwise instructed. NUTRITION:  · Drink plenty of fluids to keep your pee (urine) clear or pale yellow  · Begin with a light meal and progress to your normal diet.  Heavy or fried foods are harder to digest and may make you feel sick to your stomach (nauseated). · Once you are feeling back to normal, you may resume your normal diet as instructed by your doctor. · Avoid alcoholic beverages for 24 hours or as instructed. IF YOU HAD BIOPSIES TAKEN OR POLYPS REMOVED DURING THE PROCEDURE:  · For the next 7 days, avoid all non-steroidal antiinflammatory medications such as Ibuprofen, Motrin, Advil, Alleve, Isha-seltzer, Goody's powder, BC powder. · If you do not have an heart condition that requires you to take a daily aspirin, you should avoid taking aspirin for 7 days. · Eat a soft diet for 24 hours. · Monitor your stools for any blood or dark black, tar-like, stools as this may be a sign of bleeding and if you see any blood, notify your doctor immediately. GET HELP RIGHT AWAY AND SEEK IMMEDIATE MEDICAL CARE IF:  · You have more than a spotting of blood in your stool. · You pass clumps of tissue (blood clots) or fill the toilet with blood. · Your belly is painfully swollen or puffy (abdominal distention). · You throw up (vomit). · You have a fever. · You have redness, pain or swelling at the IV site that last greater than two days. · You have abdominal pain or discomfort that is severe or gets worse throughout the day. Post-procedure diagnosis:  Gastritis  Right Colon polyp  Inadequate colon prep    Post-procedure recommendations:    EGD Findings:  Esophagus:normal mucosa, irregular Z-line (C0M1), biopsied   Stomach: mild diffuse gastritis, biopsied  Duodenum/jejunum:normal      Colon Findings:   Rectum: semi-liquid stool   Sigmoid: semi-liquid stool   Descending Colon: semi-liquid stool   Transverse Colon: semi-liquid stool   Ascending Colon: semi-liquid stool, one 6 mm sessile polyp removed with cold snare   Cecum: semi-liquid stool     Recommendations:   - Await pathology. You should receive a letter within 2 weeks.    - Resume normal medications.  - Recommend repeat colonoscopy in 6 months with double prep. Learning About Coronavirus [phone])  Coronavirus [phone]): Overview  What is coronavirus (COVID-19)? The coronavirus disease (COVID-19) is caused by a virus. It is an illness that was [facility]. It has since spread worldwide. The virus can cause fever, cough, and trouble breathing. In severe cases, it can cause pneumonia and make it hard to breathe without help. It can cause death. Coronaviruses are a large group of viruses. They cause the common cold. They also cause more serious illnesses like Middle East respiratory syndrome (MERS) and severe acute respiratory syndrome (SARS). COVID-19 is caused by a novel coronavirus. That means it's a new type that has not been seen in people before. This virus spreads person-to-person through droplets from coughing and sneezing. It can also spread when you are close to someone who is infected. And it can spread when you touch something that has the virus on it, such as a doorknob or a tabletop. What can you do to protect yourself from coronavirus (COVID-19)? The best way to protect yourself from getting sick is to:  · Avoid areas where there is an outbreak. · Avoid contact with people who may be infected. · Wash your hands often with soap or alcohol-based hand sanitizers. · Avoid crowds and try to stay at least 6 feet away from other people. · Wash your hands often, especially after you cough or sneeze. Use soap and water, and scrub for at least 20 seconds. If soap and water aren't available, use an alcohol-based hand . · Avoid touching your mouth, nose, and eyes. What can you do to avoid spreading the virus to others? To help avoid spreading the virus to others:  · Cover your mouth with a tissue when you cough or sneeze. Then throw the tissue in the trash. · Use a disinfectant to clean things that you touch often. · Stay home if you are sick or have been exposed to the virus.  Don't go to school, work, or public areas. And don't use public transportation. · If you are sick:  ? Leave your home only if you need to get medical care. But call the doctor's office first so they know you're coming. And wear a face mask, if you have one.  ? If you have a face mask, wear it whenever you're around other people. It can help stop the spread of the virus when you cough or sneeze. ? Clean and disinfect your home every day. Use household  and disinfectant wipes or sprays. Take special care to clean things that you grab with your hands. These include doorknobs, remote controls, phones, and handles on your refrigerator and microwave. And don't forget countertops, tabletops, bathrooms, and computer keyboards. When to call for help  Call 911 anytime you think you may need emergency care. For example, call if:  · You have severe trouble breathing. (You can't talk at all.)  · You have constant chest pain or pressure. · You are severely dizzy or lightheaded. · You are confused or can't think clearly. · Your face and lips have a blue color. · You pass out (lose consciousness) or are very hard to wake up. Call your doctor now if you develop symptoms such as:  · Shortness of breath. · Fever. · Cough. If you need to get care, call ahead to the doctor's office for instructions before you go. Make sure you wear a face mask, if you have one, to prevent exposing other people to the virus. Where can you get the latest information? The following health organizations are tracking and studying this virus. Their websites contain the most up-to-date information. [place] also learn what to do if you think you may have been exposed to the virus. · U.S. Centers for Disease Control and Prevention (CDC): The CDC provides updated news about the disease and travel advice. The website also tells you how to prevent the spread of infection.  www.cdc.gov  · World Health Organization [facility]): WHO offers information about the virus outbreaks. WHO also has travel advice. www.who.int  Current as of: April 1, 2020               Content Version: 12.4  © 2006-2020 Healthwise[employer] Incorporated. Care instructions adapted under license by your healthcare professional. If you have questions about a medical condition or this instruction, always ask your healthcare professional. [address] any warranty or liability for your use of this information.

## 2022-04-25 NOTE — H&P
[address] [facility] [address]  217 [facility] 140 [facility], [address]  [phone]                                History and Physical     NAME: Cort Kocher   :  1950   MRN:  [id number]     HPI:  The patient was seen and examined.     Past Surgical History:   Procedure Laterality Date    HX CHOLECYSTECTOMY          HX HEART CATHETERIZATION      NC EPHYS EVAL PACG CVDFB LDS INITIAL IMPLAN/REPLACE  10/2/2017         NC NASAL SURG PROC UNLISTED      polyps and deviated septum    NC RMVL IMPLTBL DFB PLSE GEN W/RPLCMT PLSE GEN 2 LD  10/2/2017         XR FLUOROSCOPY UNDER 60 MINUTES  10/3/2012          Past Medical History:   Diagnosis Date    Automatic implantable cardiac defibrillator in situ 2010    Cardiac arrest ([address] 2010    ICD (implantable cardiac defibrillator) in place     Other and unspecified hyperlipidemia 2010    Other specified forms of chronic ischemic heart disease 2010    Paroxysmal ventricular tachycardia ([address] 2010    PAT (paroxysmal atrial tachycardia) ([address]     Unspecified cardiovascular disease 2010    Ventricular fibrillation ([address] 2010     Social History     Tobacco Use    Smoking status: Former Smoker     Quit date: 2004     Years since quittin.7    Smokeless tobacco: Never Used   Substance Use Topics    Alcohol use: Not Currently     Comment: occasional    Drug use: No     No Known Allergies  Family History   Problem Relation Age of Onset    Heart Disease Neg Hx      Current Facility-Administered Medications   Medication Dose Route Frequency    0.9% sodium chloride infusion  50 mL/hr IntraVENous CONTINUOUS    sodium chloride (NS) flush 5-40 mL  5-40 mL IntraVENous Q8H    sodium chloride (NS) flush 5-40 mL  5-40 mL IntraVENous PRN    midazolam (VERSED) injection 0.25-10 mg  0.25-10 mg IntraVENous Multiple    fentaNYL citrate (PF) injection 100 mcg  100 mcg IntraVENous MULTIPLE DOSE GIVEN    naloxone [facility]) injection 0.4 mg  0.4 mg IntraVENous Multiple    flumazeniL (ROMAZICON) 0.1 mg/mL injection 0.2 mg  0.2 mg IntraVENous Multiple    simethicone (MYLICON) 00WT/3.7ZP oral drops 80 mg  1.2 mL Oral Multiple    atropine injection 0.5 mg  0.5 mg IntraVENous ONCE PRN    EPINEPHrine (ADRENALIN) 0.1 mg/mL syringe 1 mg  1 mg Endoscopically ONCE PRN     Facility-Administered Medications Ordered in Other Encounters   Medication Dose Route Frequency    0.9% sodium chloride infusion   IntraVENous CONTINUOUS         PHYSICAL EXAM:  General: WD, WN. Alert, cooperative, no acute distress    HEENT: NC, Atraumatic. PERRLA, EOMI. Anicteric sclerae. Lungs:  CTA Bilaterally. No Wheezing/Rhonchi/Rales. Heart:  Regular  rhythm,  No murmur, No Rubs, No Gallops  Abdomen: Soft, Non distended, Non tender. +Bowel sounds, no HSM  Extremities: No c/c/e  Neurologic:  CN 2-12 gi, Alert and oriented X 3. No acute neurological distress   Psych:   Good insight. Not anxious nor agitated. The heart, lungs and mental status were satisfactory for the administration of MAC sedation and for the procedure. Mallampati score: 2     The patient was counseled at length about the risks of hayden Covid-19 in the stephen-operative and post-operative states including the recovery window of their procedure. The patient was made aware that hayden Covid-19 after a surgical procedure may worsen their prognosis for recovering from the virus and lend to a higher morbidity and or mortality risk. The patient was given the options of postponing their procedure. All of the risks, benefits, and alternatives were discussed. The patient does wish to proceed with the procedure.       Assessment:   · Sanabria's, GRD, history polyps and FH colon cancer    Plan:   · Endoscopic procedure :egd/colonoscopy   · MAC sedation

## 2022-04-25 NOTE — ROUTINE PROCESS
Milton Samson  1950  [id number]    Situation:  Verbal report received from: LARISSA Cooley Rn  Procedure: Procedure(s):  COLONOSCOPY AND UPPER ENDOSCOPY  ESOPHAGOGASTRODUODENOSCOPY (EGD)  ESOPHAGOGASTRODUODENAL (EGD) BIOPSY  ENDOSCOPIC POLYPECTOMY    Background:    Preoperative diagnosis: Adenomatous polyp of colon, unspecified part of colon [D12.6]  Sanabria's esophagus without dysplasia [K22.70]  Family history of malignant neoplasm of gastrointestinal tract [Z80.0]  Postoperative diagnosis: Gastritis  Right Colon polyp  Inadequate colon prep    :  Dr. De Luna \[facility] [place] [facility]\""  Assistant(s): Endoscopy Technician-1: Sreedhar Martinez  Endoscopy RN-1: Higinio Lin RN    Specimens:   ID Type Source Tests Collected by Time Destination   1 : Gastric biopsies Preservative Gastric  Josi Joseph MD 4/25/2022 9900 Pathology   2 : [place] biopsies Preservative GE Kendy Anton MD 4/25/2022 7010 Pathology   3 : Right Colon Polyp Preservative Colon, Right  Josi Joseph MD 4/25/2022 1014 Pathology     H. Pylori  no    Assessment:  Intra-procedure medications   Anesthesia gave intra-procedure sedation and medications, see anesthesia flow sheet yes    Intravenous fluids: NS@ KVO     Vital signs stable     Abdominal assessment: round and soft     Recommendation:  Discharge patient per MD order.   Return to floor  Family or Friend   Permission to share finding with family or friend yes
Yes

## 2022-04-27 NOTE — ANESTHESIA POSTPROCEDURE EVALUATION
Post-Anesthesia Evaluation and Assessment    Patient: Zachary Sidhu MRN: [id number]  SSN: [id number]    YOB: 1950  Age: 74 y.o. Sex: male      I have evaluated the patient and they are stable and ready for discharge from the PACU. Cardiovascular Function/Vital Signs  Visit Vitals  /70   Pulse 64   Temp 36.9 °C (98.4 °F)   Resp 18   Ht 5' 10\" (1.778 m)   Wt 83.9 kg (185 lb)   SpO2 98%   BMI 26.54 kg/m²       Patient is status post MAC anesthesia for Procedure(s):  COLONOSCOPY AND UPPER ENDOSCOPY  ESOPHAGOGASTRODUODENOSCOPY (EGD)  ESOPHAGOGASTRODUODENAL (EGD) BIOPSY  ENDOSCOPIC POLYPECTOMY. Nausea/Vomiting: None    Postoperative hydration reviewed and adequate. Pain:  Pain Scale 1: Numeric (0 - 10) (04/25/22 1040)  Pain Intensity 1: 0 (04/25/22 1040)   Managed    Neurological Status: At baseline    Mental Status, Level of Consciousness: Alert and  oriented to person, place, and time    Pulmonary Status:   O2 Device: None (Room air) (04/25/22 1040)   Adequate oxygenation and airway patent    Complications related to anesthesia: None    Post-anesthesia assessment completed. No concerns    Signed By: Maryam Patton MD     April 27, 2022              Procedure(s):  COLONOSCOPY AND UPPER ENDOSCOPY  ESOPHAGOGASTRODUODENOSCOPY (EGD)  ESOPHAGOGASTRODUODENAL (EGD) BIOPSY  ENDOSCOPIC POLYPECTOMY. MAC    <BSHSIANPOST>    INITIAL Post-op Vital signs:   Vitals Value Taken Time   /70 04/25/22 1040   Temp 36.9 °C (98.4 °F) 04/25/22 1024   Pulse 60 04/25/22 1043   Resp 15 04/25/22 1043   SpO2 98 % 04/25/22 1040   Vitals shown include unvalidated device data.

## 2022-05-12 ENCOUNTER — TELEPHONE (OUTPATIENT)
Dept: CARDIOLOGY CLINIC | Age: 72
End: 2022-05-12

## 2022-05-12 LAB
ALBUMIN SERPL-MCNC: 3.8 G/DL (ref 3.7–4.7)
ALBUMIN/GLOB SERPL: 2.1 {RATIO} (ref 1.2–2.2)
ALP SERPL-CCNC: 58 IU/L (ref 44–121)
ALT SERPL-CCNC: 34 IU/L (ref 0–44)
AST SERPL-CCNC: 16 IU/L (ref 0–40)
BILIRUB SERPL-MCNC: 0.3 MG/DL (ref 0–1.2)
BUN SERPL-MCNC: 23 MG/DL (ref 8–27)
BUN/CREAT SERPL: 26 (ref 10–24)
CALCIUM SERPL-MCNC: 8.9 MG/DL (ref 8.6–10.2)
CHLORIDE SERPL-SCNC: 101 MMOL/L (ref 96–106)
CHOLEST SERPL-MCNC: 128 MG/DL (ref 100–199)
CO2 SERPL-SCNC: 24 MMOL/L (ref 20–29)
CREAT SERPL-MCNC: 0.88 MG/DL (ref 0.76–1.27)
EGFR: 90 ML/MIN/1.73
GLOBULIN SER CALC-MCNC: 1.8 G/DL (ref 1.5–4.5)
GLUCOSE SERPL-MCNC: 134 MG/DL (ref 65–99)
HDLC SERPL-MCNC: 59 MG/DL
IMP & REVIEW OF LAB RESULTS: NORMAL
LDLC SERPL CALC-MCNC: 51 MG/DL (ref 0–99)
POTASSIUM SERPL-SCNC: 4.3 MMOL/L (ref 3.5–5.2)
PROT SERPL-MCNC: 5.6 G/DL (ref 6–8.5)
SODIUM SERPL-SCNC: 137 MMOL/L (ref 134–144)
TRIGL SERPL-MCNC: 99 MG/DL (ref 0–149)
VLDLC SERPL CALC-MCNC: 18 MG/DL (ref 5–40)

## 2022-05-12 NOTE — TELEPHONE ENCOUNTER
Identifiers x 2. Informed of Dr. Ankit Hamm response. Confirmed follow up.     Future Appointments   Date Time Provider Cyrus Carmen   6/6/2022  2:00 PM Reinaldo Whittaker BS AMB   6/6/2022  2:30 PM KEESHA Decker AMB   6/14/2022  3:00 PM MD HOLDEN Hughes BS AMB

## 2022-05-24 ENCOUNTER — TELEPHONE (OUTPATIENT)
Dept: CARDIOLOGY CLINIC | Age: 72
End: 2022-05-24

## 2022-05-24 NOTE — TELEPHONE ENCOUNTER
RGMIESHA is requesting cardiac clearance for patient to undergo an endoscopy procedure. Clearance to include holding eliquis. If okay, how many days can he hold?     Fax #: [phone]

## 2022-06-05 NOTE — PROGRESS NOTES
Cardiac Electrophysiology Office VISIT Note        Subjective: Leonela Ardon is a 74 y.o. man who presents for follow up, is s/p [employer] [place] dual chamber ICD (gen change 10/02/2017, leads 2004). ICM. Last LVEF 45% via echo in 2021. NYHA II. Not on GDMT. Continues sotalol. ECG today shows NSR 69 bpm with QTc 389 ms.  K 4.3 in 2022, no Mg on record. He states he believes Mg was recently checked by oncology. On continuous supplemental oxygen 3-4 LPM.  Uses oxygen with CPAP, usually uses 3-4 LPM supplemental O2 with exercise or when he has to wear a mask. Undergoing treatment for multiple myeloma. Previous:  Hx self terminating episode VT (in VF zone) on ICD in . [employer] Riata ICD lead was examined under fluoroscopy. There was no evidence of lead breakdown. There is no evidence of electrical noises so far on the lead. This ICD lead is on recall. [employer] dual chamber ICD (gen change 10/02/2017, leads 2004)    Last PCI in . Out of hospital cardiac arrest at time of acute lateral MI in . He sees Dr Toña Caldwell for lung cancer and multiple myeloma. Known severe COPD, followed by pulmonary. 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 Primary cardiologist is Dr. Annetta Ignacio. Gets disability from [place] due to [address] exposure in [facility].    Son has multiple myeloma.         Problem List  Date Reviewed: 2021          Codes Class Noted    Abdominal aortic aneurysm (AAA) [facility]) ICD-10-CM: I71.4  ICD-9-CM: 441.4  3/30/2018    Overview Signed 3/30/2018  9:58 AM by Andrea Bejarano MD     3/18  4.0 cm on ct scan             Presence of -recalled ICD lead, subsequent encounter ICD-10-CM: T82.198D  ICD-9-CM: V58.89, 996.04  10/2/2017    Overview Signed 10/2/2017  5:33 PM by Farhana Burrell MD     RIATA ICD lead             Lung cancer, upper lobe [facility]) ICD-10-CM: C34.10  ICD-9-CM: 162.3  2015    Overview Signed 2015  5:49 PM by Ava Guillen MD      3 cm left upper lobe lung cancer stage IB 9/2015             PAT (paroxysmal atrial tachycardia) (HCC) ICD-10-CM: I47.1  ICD-9-CM: 427.0  Unknown        Encounter for long-term (current) use of high-risk medication ICD-10-CM: Z79.899  ICD-9-CM: V58.69  7/27/2012    Overview Signed 7/27/2012  9:07 AM by Ava Guillen MD     sotalol             Coronary atherosclerosis of native coronary artery ICD-10-CM: I25.10  ICD-9-CM: 414.01  4/26/2012    Overview Addendum 4/5/2017  7:00 AM by Uma Haskins MD     2/10 lexiscan cardiolyte, mostly fixed inferolateral defect. lvef 49%  9/16 lexiscan cardiolyte, mostly fixed inferolateral defect  lvef 42%             Cardiomyopathy [facility]) ICD-10-CM: I42.9  ICD-9-CM: 425.4  4/26/2012        Cardiovascular disease ICD-10-CM: I25.10  ICD-9-CM: 429.2  9/14/2010    Overview Signed 10/13/2010 12:52 PM by Manda Kemp MD     Mr. Janusz Perez cardiac history dates back to 1995 when he presented with unstable angina while living in [place] and underwent balloon angioplasty-vessel unknown. In June 2004, he had a cardiac arrest associated with an acute lateral MI. Cardiac catheterization at that time showed severe two vessel coronary disease and he had stents placed in both his obtuse marginal, and a week later in his right coronary artery. In January 2005, he had recurrent symptoms in association with an abnormal stress test and had a drug-eluting stent placed within the previously stented segment of his obtuse marginal branch. Last cardiac catheterization was in July 2005, and it showed him to have stable anatomy. He had an AICD implanted in 2004 after his out of hospital arrest. Repeat cath 2/10 show instent restenosis rca, treated at that time with BARB. LVEF 2/10 40-45%. Cardiac arrest [facility]) ICD-10-CM: I46.9  ICD-9-CM: 427.5  9/14/2010    Overview Signed 10/2/2017  5:32 PM by Ava Guillen MD     Secondary prevention of sudden death.   The initial ICD was implanted for this in [place] when he collapsed and was in [place] in the parking lot of [employer]             Other specified forms of chronic ischemic heart disease ICD-10-CM: I25.89  ICD-9-CM: 414.8  9/14/2010        Implantable cardioverter-defibrillator (ICD) in situ ICD-10-CM: Z95.810  ICD-9-CM: V45.02  9/14/2010    Overview Addendum 10/2/2017  5:32 PM by Natalia Felix MD     Generator change 10/02/17  Dr Guera Alonzo  Secondary prevention of sudden death. The initial ICD was implanted for this in [place] when he collapsed and was in [place] in the parking lot of the [employer]             Hyperlipidemia, unspecified ICD-10-CM: E78.5  ICD-9-CM: 272.4  9/14/2010        Paroxysmal ventricular tachycardia ([facility] ICD-10-CM: I47.2  ICD-9-CM: 427.1  9/14/2010        Ventricular fibrillation ([facility] [address] ICD-10-CM: I49.01  ICD-9-CM: 427.41  9/14/2010                Current Outpatient Medications   Medication Sig Dispense Refill    co-enzyme Q-10 (Co Q-10) 100 mg capsule Take 100 mg by mouth daily.  omega 3-dha-epa-fish oil (Fish OiL) 100-160-1,000 mg cap Take 1 Capsule by mouth daily.  IPRATROPIUM BROMIDE NA 2 Inhalation by Nasal route three (3) times daily.  magnesium hydroxide (Jackson Milk of Magnesia) 400 mg/5 mL suspension Take 30-60 mL by mouth nightly.  selenium 200 mcg cap Take 1 Capsule by mouth daily.  trimethoprim-sulfamethoxazole (BACTRIM DS, SEPTRA DS) 160-800 mg per tablet Take 1 Tablet by mouth. 1 tablet by mouth twice daily on Monday Wednesday Friday      fluticasone-umeclidinium-vilanterol (Trelegy Ellipta) 100-62.5-25 mcg inhaler Take 1 Puff by inhalation daily.  bortezomib (VELCADE INJECTION) by Injection route. Biweekly      ascorbic acid, vitamin C, (Vitamin C) 1,000 mg tablet Take 1,000 mg by mouth daily.  vitamin E (AQUA GEMS) 400 unit capsule Take 400 Units by mouth daily.  zoledronic acid (ZOMETA IV) by IntraVENous route.  Every 3 months      sotaloL (BETAPACE) 120 mg tablet TAKE 1 TABLET TWICE A  Tablet 1    apixaban (Eliquis) 5 mg tablet Take 5 mg by mouth two (2) times a day.  acyclovir (ZOVIRAX) 400 mg tablet       calcium-cholecalciferol, D3, (CALTRATE 600+D) tablet Take 1 Tablet by mouth daily.  cyclobenzaprine (FLEXERIL) 10 mg tablet TAKE 1 TABLET BY MOUTH DAILY AS NEEDED      dexAMETHasone (DECADRON) 4 mg tablet 5 tablets by mouth biweekly on Thursdays after VCI      fluocinolone acetonide oiL 0.01 % drop as needed.  lidocaine (LIDODERM) 5 % 1 Patch every twelve (12) hours.  Linzess 290 mcg cap capsule Take 290 mcg by mouth Daily (before breakfast).  oxyCODONE (OXYIR) 5 mg capsule TAKE 1 CAPSULE BY MOUTH EVERY 6 HOURS AS NEEDED FOR PAIN      OTHER Super beet softchews      pyridoxine, vitamin B6, (Vitamin B-6) 100 mg tablet Take 100 mg by mouth two (2) times a day.  atorvastatin (LIPITOR) 80 mg tablet TAKE 1 TABLET NIGHTLY 90 Tablet 3    nitroglycerin (NITROLINGUAL) 400 mcg/spray spray SPRAY ONE SPRAY UNDER THE TONGUE EVERY 5 MINUTES AS NEEDED 4.9 g 11    cyanocobalamin (VITAMIN B12) 500 mcg tablet Take 500 mcg by mouth two (2) times a day.  folic acid 674 mcg tablet Take 800 mcg by mouth daily.  cholecalciferol, vitamin D3, (VITAMIN D3 PO) Take 800 Units by mouth daily.  Oxygen 3- 6 liters continuously per nasal cannula.  Bifidobacterium Infantis (ALIGN) 4 mg cap Take 1 Tab by mouth daily.  predniSONE (DELTASONE) 10 mg tablet Take  by mouth daily (with breakfast).  MAGNESIUM CITRATE PO Take 1 Tab by mouth daily.  PSYLLIUM SEED (PSYLLIUM PO) Take 3 Tabs by mouth two (2) times a day.  guaiFENesin (MUCINEX) 1,200 mg Ta12 ER tablet Take 1,200 mg by mouth two (2) times a day.  loratadine (CLARITIN) 10 mg tablet Take 10 mg by mouth daily.  azelastine-fluticasone (DYMISTA) 137-50 mcg/spray spry by Nasal route two (2) times a day.       docusate sodium (COLACE) 100 mg capsule Take 100 mg by mouth daily.  tamsulosin (FLOMAX) 0.4 mg capsule Take 0.4 mg by mouth two (2) times a day.  multivitamin (ONE A DAY) tablet Take 1 Tab by mouth daily.  zolpidem (AMBIEN) 10 mg tablet Take  by mouth nightly as needed. No Known Allergies  Past Medical History:   Diagnosis Date    Automatic implantable cardiac defibrillator in situ 2010    Cardiac arrest ([facility] 2010    ICD (implantable cardiac defibrillator) in place     Other and unspecified hyperlipidemia 2010    Other specified forms of chronic ischemic heart disease 2010    Paroxysmal ventricular tachycardia ([facility] 2010    PAT (paroxysmal atrial tachycardia) ([facility]     Unspecified cardiovascular disease 2010    Ventricular fibrillation ([facility] 2010     Past Surgical History:   Procedure Laterality Date    COLONOSCOPY N/A 2022    COLONOSCOPY AND UPPER ENDOSCOPY performed by Bruno Gallardo MD at [address] HX CHOLECYSTECTOMY          HX HEART CATHETERIZATION      MD EPHYS EVAL PACG CVDFB LDS INITIAL IMPLAN/REPLACE  10/2/2017         MD NASAL SURG PROC UNLISTED      polyps and deviated septum    MD RMVL IMPLTBL DFB PLSE GEN W/RPLCMT PLSE GEN 2 LD  10/2/2017         XR FLUOROSCOPY UNDER 60 MINUTES  10/3/2012            Social History     Tobacco Use    Smoking status: Former Smoker     Quit date: 2004     Years since quittin.9    Smokeless tobacco: Never Used   Substance Use Topics    Alcohol use: Not Currently        Review of Systems:   Constitutional: Negative for fever, chills, weight loss, malaise/fatigue. HEENT: Negative for nosebleeds, vision changes. Respiratory: Negative for cough, and wheezing. + chronic stable SOB, chronic supplemental oxygen  Cardiovascular: + occasional palpitations, no orthopnea, claudication, leg swelling, syncope, and PND. Gastrointestinal: Negative for nausea, vomiting, diarrhea, melena.    Genitourinary: Negative for hematuria. Musculoskeletal: Negative for myalgias, arthralgia. Skin: Negative for rash. Heme: Does not bleed or bruise easily. Neurological: Negative for speech change and focal weakness. Objective:     Visit Vitals  /76 (BP 1 Location: Left arm, BP Patient Position: Sitting, BP Cuff Size: Adult)   Pulse 70   Resp 14   Ht 5' 10\" (1.778 m)   Wt 192 lb 6.4 oz (87.3 kg)   SpO2 98%   BMI 27.61 kg/m²       Constitutional: Well-developed and well-nourished. No distress. Head: Normocephalic and atraumatic. Eyes: Pupils are equal, round   Neck: Neck supple. No JVD present. Cardiovascular: Normal rate, regular rhythm and normal heart sounds. Exam reveals no gallop and no friction rub. No murmur heard. Pulmonary/Chest: Effort normal and breath sounds normal. No wheezes. Wearing oxygen via NC. Abdominal: Soft, distended. Musculoskeletal: Trace bilat lower extremity edema and no tenderness. Neurological: Alert,oriented. Skin: Warm & dry. Left chest ICD pocket well healed. Psychiatric: Normal mood and affect. Behavior is normal. Judgment and thought content normal.     ECG: NSR 69 bpm, QTc 389 ms. Assessment/Plan:     Imaging/Studies:  Echo (05/28/2021): LVEF 45%, mild concentric LVH, grade 2 diastolic dysfunction. Mod dilated LA. Mild MR, trivial MVP. Lexiscan cardiolite stress (10/06/2016): Inferior wall with small to mod size fixed defect. Lateral wall with small to reversible size, moderate intensity, partially reversible defect. LVEF 42%. ICD-10-CM ICD-9-CM    1. ICD (implantable cardioverter-defibrillator) in place  Z95.810 V45.02 AMB POC EKG ROUTINE W/ 12 LEADS, INTER & REP   2. Mixed hyperlipidemia  E78.2 272.2 AMB POC EKG ROUTINE W/ 12 LEADS, INTER & REP   3. Paroxysmal ventricular tachycardia (HCC)  I47.2 427.1 AMB POC EKG ROUTINE W/ 12 LEADS, INTER & REP      MAGNESIUM   4. PAT (paroxysmal atrial tachycardia) (HCC)  I47.1 427.0    5.  Encounter for monitoring sotalol therapy  Z51.81 V58.83 MAGNESIUM    Z79.899 V58.69            [employer] dual chamber ICD (gen change 10/02/2017, leads 07/02/2004): Device check today shows proper lead & generator function. Generator longevity estimated 4 yrs, 4 mos. RA 35%, RV 0.22%. Since 02/28/2022, 240 AMS episodes noted, likely due to PACs/PVCs. He does not wish to have Riata lead extracted; no sign of fracture to date. PAT: Noted via device checks despite sotalol. LVEF ok to continue sotalol. ECG today shows NSR 69 bpm, QTc 389 ms. Recent K 4.3, but no Mg on record; will order Mg today. He states he may have had Mg checked recently by oncology; he will check his records & let us know. Otherwise, he'll have Mg drawn. Avoid amiodarone due to lung CA. Remote ICD checks q 3 months. EP clinic follow up in 1 year. Follow up with Dr. Annetta Ignacio as previously scheduled.      Future Appointments   Date Time Provider Cyrus Morales   6/14/2022  3:00 PM MD HOLDEN Graves BS AMB   10/3/2022 11:00 AM REMOTE1, KARUNA HATCH BS AMB   1/9/2023  3:00 PM REMOTE1, KARUNA HATCH BS AMB   4/12/2023  9:00 AM REMOTE1, KARUNA HATCH BS AMB   7/11/2023  1:00 PM MD HOLDEN Brown BS AMB   7/11/2023  1:20 PM PACEMAKER3, Barbie HATCH BS AMB     [facility] [address] [facility] [place]  06/06/22

## 2022-06-06 ENCOUNTER — OFFICE VISIT (OUTPATIENT)
Dept: CARDIOLOGY CLINIC | Age: 72
End: 2022-06-06
Payer: MEDICARE

## 2022-06-06 ENCOUNTER — CLINICAL SUPPORT (OUTPATIENT)
Dept: CARDIOLOGY CLINIC | Age: 72
End: 2022-06-06

## 2022-06-06 VITALS
RESPIRATION RATE: 14 BRPM | BODY MASS INDEX: 27.54 KG/M2 | HEART RATE: 70 BPM | WEIGHT: 192.4 LBS | DIASTOLIC BLOOD PRESSURE: 76 MMHG | OXYGEN SATURATION: 98 % | SYSTOLIC BLOOD PRESSURE: 120 MMHG | HEIGHT: 70 IN

## 2022-06-06 DIAGNOSIS — Z95.810 ICD (IMPLANTABLE CARDIOVERTER-DEFIBRILLATOR) IN PLACE: Primary | ICD-10-CM

## 2022-06-06 DIAGNOSIS — E78.2 MIXED HYPERLIPIDEMIA: ICD-10-CM

## 2022-06-06 DIAGNOSIS — Z79.899 ENCOUNTER FOR MONITORING SOTALOL THERAPY: ICD-10-CM

## 2022-06-06 DIAGNOSIS — Z51.81 ENCOUNTER FOR MONITORING SOTALOL THERAPY: ICD-10-CM

## 2022-06-06 DIAGNOSIS — I47.1 PAT (PAROXYSMAL ATRIAL TACHYCARDIA) (HCC): ICD-10-CM

## 2022-06-06 DIAGNOSIS — I47.29 PAROXYSMAL VENTRICULAR TACHYCARDIA: ICD-10-CM

## 2022-06-06 PROCEDURE — 93283 PRGRMG EVAL IMPLANTABLE DFB: CPT | Performed by: INTERNAL MEDICINE

## 2022-06-06 PROCEDURE — 93289 INTERROG DEVICE EVAL HEART: CPT | Performed by: INTERNAL MEDICINE

## 2022-06-06 PROCEDURE — 1101F PT FALLS ASSESS-DOCD LE1/YR: CPT | Performed by: NURSE PRACTITIONER

## 2022-06-06 PROCEDURE — 93010 ELECTROCARDIOGRAM REPORT: CPT | Performed by: NURSE PRACTITIONER

## 2022-06-06 PROCEDURE — G8417 CALC BMI ABV UP PARAM F/U: HCPCS | Performed by: NURSE PRACTITIONER

## 2022-06-06 PROCEDURE — 99214 OFFICE O/P EST MOD 30 MIN: CPT | Performed by: NURSE PRACTITIONER

## 2022-06-06 PROCEDURE — G8432 DEP SCR NOT DOC, RNG: HCPCS | Performed by: NURSE PRACTITIONER

## 2022-06-06 PROCEDURE — 93005 ELECTROCARDIOGRAM TRACING: CPT | Performed by: NURSE PRACTITIONER

## 2022-06-06 PROCEDURE — G8536 NO DOC ELDER MAL SCRN: HCPCS | Performed by: NURSE PRACTITIONER

## 2022-06-06 PROCEDURE — G0463 HOSPITAL OUTPT CLINIC VISIT: HCPCS | Performed by: NURSE PRACTITIONER

## 2022-06-06 PROCEDURE — 3017F COLORECTAL CA SCREEN DOC REV: CPT | Performed by: NURSE PRACTITIONER

## 2022-06-06 PROCEDURE — 1123F ACP DISCUSS/DSCN MKR DOCD: CPT | Performed by: NURSE PRACTITIONER

## 2022-06-06 PROCEDURE — G8427 DOCREV CUR MEDS BY ELIG CLIN: HCPCS | Performed by: NURSE PRACTITIONER

## 2022-06-06 RX ORDER — ADHESIVE BANDAGE
30-60 BANDAGE TOPICAL
COMMUNITY

## 2022-06-06 RX ORDER — CHOLECALCIFEROL (VITAMIN D3) 125 MCG
100 CAPSULE ORAL DAILY
COMMUNITY

## 2022-06-06 RX ORDER — CYANOCOBALAMIN (VITAMIN B-12) 1000 MCG
1 TABLET, EXTENDED RELEASE ORAL DAILY
COMMUNITY

## 2022-06-06 RX ORDER — SULFAMETHOXAZOLE AND TRIMETHOPRIM 800; 160 MG/1; MG/1
1 TABLET ORAL
COMMUNITY

## 2022-06-06 RX ORDER — FLUTICASONE FUROATE, UMECLIDINIUM BROMIDE AND VILANTEROL TRIFENATATE 100; 62.5; 25 UG/1; UG/1; UG/1
1 POWDER RESPIRATORY (INHALATION) DAILY
COMMUNITY

## 2022-06-06 RX ORDER — CHOLECALCIFEROL (VITAMIN D3) 50 MCG
1 CAPSULE ORAL DAILY
COMMUNITY

## 2022-06-06 RX ORDER — VITAMIN E 268 MG
400 CAPSULE ORAL DAILY
COMMUNITY

## 2022-06-06 RX ORDER — VITAMIN E 1000 UNIT
1000 CAPSULE ORAL DAILY
COMMUNITY

## 2022-06-08 ENCOUNTER — TELEPHONE (OUTPATIENT)
Dept: CARDIOLOGY CLINIC | Age: 72
End: 2022-06-08

## 2022-06-08 NOTE — TELEPHONE ENCOUNTER
Patient PCP is calling because she wants to put the patient on a new medication Farxiga 5 mg. PCP would like to make sure that Nicolle Dus is okay with the patient on this medicine. Please advise.     [phone]

## 2022-06-14 ENCOUNTER — OFFICE VISIT (OUTPATIENT)
Dept: CARDIOLOGY CLINIC | Age: 72
End: 2022-06-14
Payer: MEDICARE

## 2022-06-14 VITALS
DIASTOLIC BLOOD PRESSURE: 68 MMHG | WEIGHT: 192.6 LBS | HEIGHT: 70 IN | SYSTOLIC BLOOD PRESSURE: 108 MMHG | RESPIRATION RATE: 18 BRPM | OXYGEN SATURATION: 98 % | HEART RATE: 66 BPM | BODY MASS INDEX: 27.57 KG/M2

## 2022-06-14 DIAGNOSIS — I25.10 ATHEROSCLEROSIS OF NATIVE CORONARY ARTERY OF NATIVE HEART WITHOUT ANGINA PECTORIS: ICD-10-CM

## 2022-06-14 DIAGNOSIS — Z51.81 ENCOUNTER FOR MONITORING SOTALOL THERAPY: ICD-10-CM

## 2022-06-14 DIAGNOSIS — Z79.899 ENCOUNTER FOR MONITORING SOTALOL THERAPY: ICD-10-CM

## 2022-06-14 DIAGNOSIS — I47.29 PAROXYSMAL VENTRICULAR TACHYCARDIA: ICD-10-CM

## 2022-06-14 DIAGNOSIS — I25.5 ISCHEMIC CARDIOMYOPATHY: Primary | ICD-10-CM

## 2022-06-14 DIAGNOSIS — E78.2 MIXED HYPERLIPIDEMIA: ICD-10-CM

## 2022-06-14 PROCEDURE — G0463 HOSPITAL OUTPT CLINIC VISIT: HCPCS | Performed by: SPECIALIST

## 2022-06-14 PROCEDURE — G8417 CALC BMI ABV UP PARAM F/U: HCPCS | Performed by: SPECIALIST

## 2022-06-14 PROCEDURE — G8536 NO DOC ELDER MAL SCRN: HCPCS | Performed by: SPECIALIST

## 2022-06-14 PROCEDURE — G8510 SCR DEP NEG, NO PLAN REQD: HCPCS | Performed by: SPECIALIST

## 2022-06-14 PROCEDURE — 1101F PT FALLS ASSESS-DOCD LE1/YR: CPT | Performed by: SPECIALIST

## 2022-06-14 PROCEDURE — 3017F COLORECTAL CA SCREEN DOC REV: CPT | Performed by: SPECIALIST

## 2022-06-14 PROCEDURE — 1123F ACP DISCUSS/DSCN MKR DOCD: CPT | Performed by: SPECIALIST

## 2022-06-14 PROCEDURE — 99213 OFFICE O/P EST LOW 20 MIN: CPT | Performed by: SPECIALIST

## 2022-06-14 PROCEDURE — G8427 DOCREV CUR MEDS BY ELIG CLIN: HCPCS | Performed by: SPECIALIST

## 2022-06-14 NOTE — PROGRESS NOTES
HISTORY OF PRESENT ILLNESS  Zita Alba is a 74 y.o. male     SUMMARY:   Problem List  Date Reviewed: 6/14/2022          Codes Class Noted    Abdominal aortic aneurysm (AAA) ([facility] ICD-10-CM: I71.4  ICD-9-CM: 441.4  3/30/2018    Overview Signed 3/30/2018  9:58 AM by Kayden Munoz MD     3/18  4.0 cm on ct scan             Presence of -recalled ICD lead, subsequent encounter ICD-10-CM: T82.198D  ICD-9-CM: V58.89, 996.04  10/2/2017    Overview Signed 10/2/2017  5:33 PM by Maria Del Rosario Avila MD     [address] ICD lead             Lung cancer, upper lobe [facility]) ICD-10-CM: C34.10  ICD-9-CM: 162.3  9/27/2015    Overview Signed 9/27/2015  5:49 PM by Maria Del Rosario Avila MD      3 cm left upper lobe lung cancer stage IB 9/2015             PAT (paroxysmal atrial tachycardia) ([facility] ICD-10-CM: I47.1  ICD-9-CM: 427.0  Unknown        Encounter for long-term (current) use of high-risk medication ICD-10-CM: Z79.899  ICD-9-CM: V58.69  7/27/2012    Overview Signed 7/27/2012  9:07 AM by Maria Del Rosario Avila MD     sotalol             Coronary atherosclerosis of native coronary artery ICD-10-CM: I25.10  ICD-9-CM: 414.01  4/26/2012    Overview Addendum 4/5/2017  7:00 AM by Kayden Munoz MD     2/10 lexiscan cardiolyte, mostly fixed inferolateral defect. lvef 49%  9/16 lexiscan cardiolyte, mostly fixed inferolateral defect  lvef 42%             Cardiomyopathy [facility]) ICD-10-CM: I42.9  ICD-9-CM: 425.4  4/26/2012        Cardiovascular disease ICD-10-CM: I25.10  ICD-9-CM: 429.2  9/14/2010    Overview Signed 10/13/2010 12:52 PM by Marianna Paz MD     Mr. Carrero Fresh cardiac history dates back to 1995 when he presented with unstable angina while living in [place] and underwent balloon angioplasty-vessel unknown. In June 2004, he had a cardiac arrest associated with an acute lateral MI.  Cardiac catheterization at that time showed severe two vessel coronary disease and he had stents placed in both his obtuse marginal, and a week later in his right coronary artery. In January 2005, he had recurrent symptoms in association with an abnormal stress test and had a drug-eluting stent placed within the previously stented segment of his obtuse marginal branch. Last cardiac catheterization was in July 2005, and it showed him to have stable anatomy. He had an AICD implanted in 2004 after his out of hospital arrest. Repeat cath 2/10 show instent restenosis rca, treated at that time with BRAB. LVEF 2/10 40-45%. Cardiac arrest [facility]) ICD-10-CM: I46.9  ICD-9-CM: 427.5  9/14/2010    Overview Signed 10/2/2017  5:32 PM by Ann Marie Howell MD     Secondary prevention of sudden death. The initial ICD was implanted for this in [place] when he collapsed and was in [place] in the parking lot of the [facility]             Other specified forms of chronic ischemic heart disease ICD-10-CM: I25.89  ICD-9-CM: 414.8  9/14/2010        Implantable cardioverter-defibrillator (ICD) in situ ICD-10-CM: Z95.810  ICD-9-CM: V45.02  9/14/2010    Overview Addendum 10/2/2017  5:32 PM by Ann Marie Howell MD     Generator change 10/02/17  Dr Sanjay Michelle  Secondary prevention of sudden death. The initial ICD was implanted for this in [place] when he collapsed and was in [place] in the parking lot of the [facility]             Hyperlipidemia, unspecified ICD-10-CM: E78.5  ICD-9-CM: 272.4  9/14/2010        Paroxysmal ventricular tachycardia (HCC) ICD-10-CM: I47.2  ICD-9-CM: 427.1  9/14/2010        Ventricular fibrillation ([facility] ICD-10-CM: I49.01  ICD-9-CM: 427.41  9/14/2010              Current Outpatient Medications on File Prior to Visit   Medication Sig    co-enzyme Q-10 (Co Q-10) 100 mg capsule Take 100 mg by mouth daily.  omega 3-dha-epa-fish oil (Fish OiL) 100-160-1,000 mg cap Take 1 Capsule by mouth daily.  IPRATROPIUM BROMIDE NA 2 Inhalation by Nasal route three (3) times daily.     magnesium hydroxide (Jackson Milk of Magnesia) 400 mg/5 mL suspension Take 30-60 mL by mouth nightly.  selenium 200 mcg cap Take 1 Capsule by mouth daily.  trimethoprim-sulfamethoxazole (BACTRIM DS, SEPTRA DS) 160-800 mg per tablet Take 1 Tablet by mouth. 1 tablet by mouth twice daily on Monday Wednesday Friday    fluticasone-umeclidinium-vilanterol (Trelegy Ellipta) 100-62.5-25 mcg inhaler Take 1 Puff by inhalation daily.  bortezomib (VELCADE INJECTION) by Injection route. Biweekly    ascorbic acid, vitamin C, (Vitamin C) 1,000 mg tablet Take 1,000 mg by mouth daily.  vitamin E (AQUA GEMS) 400 unit capsule Take 400 Units by mouth daily.  zoledronic acid (ZOMETA IV) by IntraVENous route. Every 3 months    sotaloL (BETAPACE) 120 mg tablet TAKE 1 TABLET TWICE A DAY    apixaban (Eliquis) 5 mg tablet Take 5 mg by mouth two (2) times a day.  acyclovir (ZOVIRAX) 400 mg tablet     calcium-cholecalciferol, D3, (CALTRATE 600+D) tablet Take 1 Tablet by mouth daily.  cyclobenzaprine (FLEXERIL) 10 mg tablet TAKE 1 TABLET BY MOUTH DAILY AS NEEDED    dexAMETHasone (DECADRON) 4 mg tablet 5 tablets by mouth biweekly on Thursdays after [facility]    fluocinolone acetonide oiL 0.01 % drop as needed.  lidocaine (LIDODERM) 5 % 1 Patch every twelve (12) hours.  Linzess 290 mcg cap capsule Take 290 mcg by mouth Daily (before breakfast).  oxyCODONE (OXYIR) 5 mg capsule TAKE 1 CAPSULE BY MOUTH EVERY 6 HOURS AS NEEDED FOR PAIN    OTHER Super beet softchews    pyridoxine, vitamin B6, (Vitamin B-6) 100 mg tablet Take 100 mg by mouth two (2) times a day.  atorvastatin (LIPITOR) 80 mg tablet TAKE 1 TABLET NIGHTLY    nitroglycerin (NITROLINGUAL) 400 mcg/spray spray SPRAY ONE SPRAY UNDER THE TONGUE EVERY 5 MINUTES AS NEEDED    cyanocobalamin (VITAMIN B12) 500 mcg tablet Take 500 mcg by mouth two (2) times a day.  folic acid 619 mcg tablet Take 800 mcg by mouth daily.  cholecalciferol, vitamin D3, (VITAMIN D3 PO) Take 800 Units by mouth daily.     Oxygen 3- 6 liters continuously per nasal cannula.  Bifidobacterium Infantis (ALIGN) 4 mg cap Take 1 Tab by mouth daily.  predniSONE (DELTASONE) 10 mg tablet Take  by mouth daily (with breakfast).  MAGNESIUM CITRATE PO Take 1 Tab by mouth daily.  PSYLLIUM SEED (PSYLLIUM PO) Take 3 Tabs by mouth two (2) times a day.  guaiFENesin (MUCINEX) 1,200 mg Ta12 ER tablet Take 1,200 mg by mouth two (2) times a day.  loratadine (CLARITIN) 10 mg tablet Take 10 mg by mouth daily.  azelastine-fluticasone (DYMISTA) 137-50 mcg/spray spry by Nasal route two (2) times a day.  docusate sodium (COLACE) 100 mg capsule Take 100 mg by mouth daily.  tamsulosin (FLOMAX) 0.4 mg capsule Take 0.4 mg by mouth two (2) times a day.  multivitamin (ONE A DAY) tablet Take 1 Tab by mouth daily.  zolpidem (AMBIEN) 10 mg tablet Take  by mouth nightly as needed. No current facility-administered medications on file prior to visit. CARDIOLOGY STUDIES TO DATE:  2/10 lexiscan cardiolyte, mostly fixed inferolateral defect. lvef 49%  9/16 lexiscan cardiolyte, mostly fixed inferolateral defect  lvef 42%  10/18 echo lvef 40-45%, lae, mild mr and tr without pul htn  5/21 echo lvef 45%, lvh, lae, mild mr, pa pressure 37mm    Chief Complaint   Patient presents with    Follow-up     6 month     HPI :  Overall he is quite well compensated and asymptomatic from a cardiac standpoint. He does some light exercise and activity around the house but is not really getting out walking anymore. He had to stop some of his chemo because of the low platelet count. His sugars have been out of control because of the prednisone he is on so they are making some adjustments in his diabetic medications. He is keeping up with his device checks.   He just bought a new forward [employer] hybrid truck  CARDIAC ROS:   negative for chest pain, palpitations, syncope, orthopnea, paroxysmal nocturnal dyspnea, exertional chest pressure/discomfort, claudication, lower extremity edema    Family History   Problem Relation Age of Onset    Heart Disease Neg Hx        Past Medical History:   Diagnosis Date    Automatic implantable cardiac defibrillator in situ 9/14/2010    Cardiac arrest ([facility] 9/14/2010    ICD (implantable cardiac defibrillator) in place     Other and unspecified hyperlipidemia 9/14/2010    Other specified forms of chronic ischemic heart disease 9/14/2010    Paroxysmal ventricular tachycardia ([facility] 9/14/2010    PAT (paroxysmal atrial tachycardia) ([facility]     Unspecified cardiovascular disease 9/14/2010    Ventricular fibrillation ([facility] 9/14/2010       GENERAL ROS:  A comprehensive review of systems was negative except for that written in the HPI.     Visit Vitals  /68 (BP 1 Location: Left upper arm, BP Patient Position: Sitting, BP Cuff Size: Adult)   Pulse 66   Resp 18   Ht 5' 10\" (1.778 m)   Wt 192 lb 9.6 oz (87.4 kg)   SpO2 98%   BMI 27.64 kg/m²       Wt Readings from Last 3 Encounters:   06/14/22 192 lb 9.6 oz (87.4 kg)   06/06/22 192 lb 6.4 oz (87.3 kg)   04/25/22 185 lb (83.9 kg)            BP Readings from Last 3 Encounters:   06/14/22 108/68   06/06/22 120/76   04/25/22 125/70       PHYSICAL EXAM  General appearance: alert, cooperative, no distress, appears stated age  Neurologic: Alert and oriented X 3  Neck: supple, symmetrical, trachea midline, no adenopathy, no carotid bruit and no JVD  Lungs: clear to auscultation bilaterally  Heart: regular rate and rhythm, S1, S2 normal, no murmur, click, rub or gallop  Extremities: extremities normal, atraumatic, no cyanosis or edema    Lab Results   Component Value Date/Time    Cholesterol, total 128 05/11/2022 08:35 AM    Cholesterol, total 138 11/11/2021 07:56 AM    Cholesterol, total 129 05/13/2021 07:40 AM    Cholesterol, total 133 02/10/2021 07:42 AM    Cholesterol, total 132 11/17/2020 09:12 AM    HDL Cholesterol 59 05/11/2022 08:35 AM    HDL Cholesterol 63 11/11/2021 07:56 AM    HDL Cholesterol 55 05/13/2021 07:40 AM    HDL Cholesterol 49 02/10/2021 07:42 AM    HDL Cholesterol 45 11/17/2020 09:12 AM    LDL, calculated 51 05/11/2022 08:35 AM    LDL, calculated 58 11/11/2021 07:56 AM    LDL, calculated 56 05/13/2021 07:40 AM    LDL, calculated 66 02/10/2021 07:42 AM    LDL, calculated 70 11/17/2020 09:12 AM    LDL, calculated 79 08/11/2020 08:49 AM    LDL, calculated 69 01/21/2020 09:15 AM    LDL, calculated 70 07/25/2019 09:09 AM    LDL, calculated 69 03/14/2019 08:56 AM    LDL, calculated 74 01/24/2019 08:05 AM    Triglyceride 99 05/11/2022 08:35 AM    Triglyceride 94 11/11/2021 07:56 AM    Triglyceride 99 05/13/2021 07:40 AM    Triglyceride 97 02/10/2021 07:42 AM    Triglyceride 91 11/17/2020 09:12 AM     ASSESSMENT :      He is stable and I think asymptomatic from a cardiac perspective, well compensated on a good medical regimen and needs no cardiac testing. current treatment plan is effective, no change in therapy  lab results and schedule of future lab studies reviewed with patient  reviewed diet, exercise and weight control    Encounter Diagnoses   Name Primary?  Ischemic cardiomyopathy Yes    Atherosclerosis of native coronary artery of native heart without angina pectoris     Paroxysmal ventricular tachycardia (HCC)     Mixed hyperlipidemia      No orders of the defined types were placed in this encounter. Follow-up and Dispositions    · Return in about 6 months (around 12/14/2022). Eddie Walter MD  6/14/2022  Please note that this dictation was completed with Keepstream, the [employer] voice recognition software. Quite often unanticipated grammatical, syntax, homophones, and other interpretive errors are inadvertently transcribed by the computer software. Please disregard these errors. Please excuse any errors that have escaped final proofreading. Thank you.

## 2022-06-15 LAB — MAGNESIUM SERPL-MCNC: 2.2 MG/DL (ref 1.6–2.4)

## 2022-08-08 DIAGNOSIS — I25.10 ATHEROSCLEROSIS OF NATIVE CORONARY ARTERY OF NATIVE HEART WITHOUT ANGINA PECTORIS: ICD-10-CM

## 2022-08-08 RX ORDER — ATORVASTATIN CALCIUM 80 MG/1
TABLET, FILM COATED ORAL
Qty: 90 TABLET | Refills: 3 | Status: SHIPPED | OUTPATIENT
Start: 2022-08-08

## 2022-08-08 NOTE — TELEPHONE ENCOUNTER
Requested Prescriptions     Signed Prescriptions Disp Refills    atorvastatin (LIPITOR) 80 mg tablet 90 Tablet 3     Sig: TAKE 1 TABLET NIGHTLY     Authorizing Provider: Jean Mccormick     Ordering User: Eli Root    Per Dr. Karl Lizama verbal orders

## 2022-09-29 LAB — HBA1C MFR BLD HPLC: 6.6 %

## 2022-10-03 ENCOUNTER — OFFICE VISIT (OUTPATIENT)
Dept: CARDIOLOGY CLINIC | Age: 72
End: 2022-10-03
Payer: MEDICARE

## 2022-10-03 DIAGNOSIS — Z95.810 AUTOMATIC IMPLANTABLE CARDIAC DEFIBRILLATOR IN SITU: Primary | ICD-10-CM

## 2022-10-03 PROCEDURE — 93295 DEV INTERROG REMOTE 1/2/MLT: CPT | Performed by: INTERNAL MEDICINE

## 2022-10-07 ENCOUNTER — TELEPHONE (OUTPATIENT)
Dept: CARDIOLOGY CLINIC | Age: 72
End: 2022-10-07

## 2022-10-07 NOTE — TELEPHONE ENCOUNTER
Verified patient with two types of identifiers. Patient is currently admitted to [address] [place] [address] for evaluation of possible seizures. He collapsed last Wednesday, was taken to [facility] and started on Keppra. He was discharged on Thursday. He had 2 more episodes of collapsing on Monday and Tuesday and currently admitted. He is taking Keppra 750 mg BID. They do not think it is cardiac related. His Oncologist came by to see him as well and agrees with possible seizures. They are talking about doing an MRI; however, patient knows his device is NOT MRI conditional. They are speaking with [facility] about possibility getting and MRI at their facility. Notified that in most cases they will send an MRI form for  Dr. Trevon Kim to complete. I am unfamiliar with [facility] protocol regarding MRIs with non conditional devices. Encouraged them to discuss with MD ordering MRI. Patient verbalized understanding and will call with any other questions.       Future Appointments   Date Time Provider Cyrus Morales   12/14/2022  2:20 PM MD HOLDEN Armenta III AMB   1/9/2023  3:00 PM [id number] AMB   4/12/2023  9:00 AM [id number] AMB   7/11/2023  1:00 PM MD HOLDEN Ray AMB   7/11/2023  1:20 PM [id number] AMB

## 2022-10-07 NOTE — TELEPHONE ENCOUNTER
Patient would like to speak with the nurse. Patient is in the hospital at [facility] and he may have some issues that may be cardiac or not but he would like to speak with the doctor about this.     [phone]

## 2022-10-17 ENCOUNTER — TELEPHONE (OUTPATIENT)
Dept: CARDIOLOGY CLINIC | Age: 72
End: 2022-10-17

## 2022-10-17 NOTE — TELEPHONE ENCOUNTER
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  Needs to try and drink more water, maybe even liberalize salt intake if bp is that low

## 2022-10-17 NOTE — TELEPHONE ENCOUNTER
Pt requested to speak with the nurse in reference to Actual Experience message sent to Dr. Jhonny Kingsley, please advise      Pt # [phone]

## 2022-10-17 NOTE — TELEPHONE ENCOUNTER
I returned patient's call, two identifiers verified. Patient called to follow up on the Qwilt message that he sent today, He would like to notify Dr. Jhonny Kingsley and to ask his opinion and recommendation. I have explained to the patient that Dr. Jhonny Kingsley is out of the office today, I have routed his Qwilt message to him. Patient stated that he was sent to the ED [facility] by ambulance on 09/28/2022 due to passing out. He was advised by his Neurologist to have MRI after the result of his EEG showed some left brain abnormality. MRI will be done at [facility] because they have the machine that is compatible with his pacemaker device. - no schedule yet. Recently, his home health nurse checked his BP and told him that his BP dropped 10 points from sitting to standing. Per patient, his BP dropped to 90/75. He also added that he noticed feeling dizzy when he changes position. He is taking precautionary measures to prevent from falling and symptoms from recurring. He was advised by his home health nurse to notify Cardiologist about the BP reading and symptoms. He is monitoring his BP. Morning BP: 113/68  Repeat BP at 2 BP: 103/62  Patient denies any symptoms today. I will route Dr. Jhonny Kingsley this message.

## 2022-12-07 DIAGNOSIS — Z95.810 ICD (IMPLANTABLE CARDIOVERTER-DEFIBRILLATOR) IN PLACE: ICD-10-CM

## 2022-12-07 DIAGNOSIS — I25.9 ISCHEMIC HEART DISEASE: ICD-10-CM

## 2022-12-07 DIAGNOSIS — I47.1 PAT (PAROXYSMAL ATRIAL TACHYCARDIA) (HCC): ICD-10-CM

## 2022-12-07 DIAGNOSIS — I46.9 CARDIAC ARREST (HCC): ICD-10-CM

## 2022-12-07 DIAGNOSIS — I42.9 CARDIOMYOPATHY, UNSPECIFIED TYPE (HCC): ICD-10-CM

## 2022-12-07 DIAGNOSIS — I47.29 PAROXYSMAL VENTRICULAR TACHYCARDIA: ICD-10-CM

## 2022-12-07 DIAGNOSIS — E78.5 HYPERLIPIDEMIA, UNSPECIFIED HYPERLIPIDEMIA TYPE: ICD-10-CM

## 2022-12-07 DIAGNOSIS — I49.01 VENTRICULAR FIBRILLATION (HCC): ICD-10-CM

## 2022-12-07 DIAGNOSIS — I25.10 CARDIOVASCULAR DISEASE: ICD-10-CM

## 2022-12-07 RX ORDER — SOTALOL HYDROCHLORIDE 120 MG/1
TABLET ORAL
Qty: 180 TABLET | Refills: 0 | Status: SHIPPED | OUTPATIENT
Start: 2022-12-07

## 2022-12-07 NOTE — TELEPHONE ENCOUNTER
Requested Prescriptions     Signed Prescriptions Disp Refills    sotaloL (BETAPACE) 120 mg tablet 180 Tablet 0     Sig: TAKE 1 TABLET TWICE A DAY     Authorizing Provider: Richard Baxter     Ordering User: Salma Paula    Per Dr. Ivette Haley verbal orders     Note by upcoming VV that we will need to bring him in soon for an ekg because he is on sotalol, needs monitoring

## 2022-12-14 ENCOUNTER — VIRTUAL VISIT (OUTPATIENT)
Dept: CARDIOLOGY CLINIC | Age: 72
End: 2022-12-14
Payer: MEDICARE

## 2022-12-14 ENCOUNTER — TELEPHONE (OUTPATIENT)
Dept: CARDIOLOGY CLINIC | Age: 72
End: 2022-12-14

## 2022-12-14 DIAGNOSIS — E78.5 HYPERLIPIDEMIA, UNSPECIFIED HYPERLIPIDEMIA TYPE: Primary | ICD-10-CM

## 2022-12-14 DIAGNOSIS — Z95.810 ICD (IMPLANTABLE CARDIOVERTER-DEFIBRILLATOR) IN PLACE: ICD-10-CM

## 2022-12-14 DIAGNOSIS — I42.9 CARDIOMYOPATHY, UNSPECIFIED TYPE (HCC): ICD-10-CM

## 2022-12-14 DIAGNOSIS — E78.5 HYPERLIPIDEMIA, UNSPECIFIED HYPERLIPIDEMIA TYPE: ICD-10-CM

## 2022-12-14 DIAGNOSIS — I25.10 ATHEROSCLEROSIS OF NATIVE CORONARY ARTERY OF NATIVE HEART WITHOUT ANGINA PECTORIS: Primary | ICD-10-CM

## 2022-12-14 PROCEDURE — G0463 HOSPITAL OUTPT CLINIC VISIT: HCPCS | Performed by: SPECIALIST

## 2022-12-14 RX ORDER — PROCHLORPERAZINE EDISYLATE 5 MG/ML
INJECTION INTRAMUSCULAR; INTRAVENOUS
COMMUNITY
Start: 2022-09-24

## 2022-12-14 RX ORDER — PREDNISONE 10 MG/1
TABLET ORAL
COMMUNITY
Start: 2021-04-10 | End: 2022-12-14 | Stop reason: SDUPTHER

## 2022-12-14 RX ORDER — LEVETIRACETAM 750 MG/1
TABLET ORAL 2 TIMES DAILY
COMMUNITY
Start: 2022-10-07

## 2022-12-14 RX ORDER — EMPAGLIFLOZIN 10 MG/1
TABLET, FILM COATED ORAL
COMMUNITY
Start: 2022-12-01

## 2022-12-14 RX ORDER — OXYCODONE HYDROCHLORIDE 5 MG/1
5 TABLET ORAL
COMMUNITY
Start: 2022-12-08

## 2022-12-14 RX ORDER — FINASTERIDE 5 MG/1
5 TABLET, FILM COATED ORAL DAILY
COMMUNITY
Start: 2022-11-01

## 2022-12-14 NOTE — TELEPHONE ENCOUNTER
----- Message from Leslye Shine MD sent at 12/14/2022 12:28 PM EST -----  He want a lab slip for lipids sent through the portal, doesn't need a cmp because oncology monitors that frequently because of chemo. He will get ekg with pcp and fax to us, his wife has no problem taking him there.  April in office appt

## 2022-12-14 NOTE — PROGRESS NOTES
[facility] Telemedicine Encounter                                                         Pursuant to the emergency declaration under the [facility], [facility] waiver authority and the [employer] Act, this Virtual  Visit was conducted, with patient's consent, to reduce the patient's risk of exposure to COVID-19 and provide continuity of care for an established patient. Services were provided through a video synchronous discussion virtually to substitute for in-person clinic visit. 2/10 lexiscan cardiolyte, mostly fixed inferolateral defect. lvef 49%  9/16 lexiscan cardiolyte, mostly fixed inferolateral defect  lvef 42%  10/18 echo lvef 40-45%, lae, mild mr and tr without pul htn  5/21 echo lvef 45%, lvh, lae, mild mr, pa pressure 37mm     Current Outpatient Medications on File Prior to Visit   Medication Sig    Jardiance 10 mg tablet     levETIRAcetam (KEPPRA) 750 mg tablet two (2) times a day. finasteride (PROSCAR) 5 mg tablet Take 5 mg by mouth daily. prochlorperazine (COMPAZINE) 5 mg/mL injection     oxyCODONE IR (ROXICODONE) 5 mg immediate release tablet Take 5 mg by mouth every six (6) hours as needed. sotaloL (BETAPACE) 120 mg tablet TAKE 1 TABLET TWICE A DAY    atorvastatin (LIPITOR) 80 mg tablet TAKE 1 TABLET NIGHTLY    co-enzyme Q-10 (CO Q-10) 100 mg capsule Take 100 mg by mouth daily. omega 3-dha-epa-fish oil (Fish OiL) 100-160-1,000 mg cap Take 1 Capsule by mouth daily. selenium 200 mcg cap Take 1 Capsule by mouth daily. trimethoprim-sulfamethoxazole (BACTRIM DS, SEPTRA DS) 160-800 mg per tablet Take 1 Tablet by mouth. 1 tablet by mouth twice daily on Monday Wednesday Friday    fluticasone-umeclidinium-vilanterol (Trelegy Ellipta) 100-62.5-25 mcg inhaler Take 1 Puff by inhalation daily. bortezomib (VELCADE INJECTION) by Injection route.  Biweekly    zoledronic acid (ZOMETA IV) by IntraVENous route. Every 3 months    apixaban (ELIQUIS) 5 mg tablet Take 5 mg by mouth two (2) times a day. acyclovir (ZOVIRAX) 400 mg tablet     calcium-cholecalciferol, D3, (CALTRATE 600+D) tablet Take 1 Tablet by mouth daily. cyclobenzaprine (FLEXERIL) 10 mg tablet TAKE 1 TABLET BY MOUTH DAILY AS NEEDED    dexAMETHasone (DECADRON) 4 mg tablet 5 tablets by mouth biweekly on Thursdays after [facility]    lidocaine (LIDODERM) 5 % 1 Patch every twelve (12) hours. oxyCODONE (OXYIR) 5 mg capsule TAKE 1 CAPSULE BY MOUTH EVERY 6 HOURS AS NEEDED FOR PAIN    OTHER Super beet softchews    pyridoxine, vitamin B6, (VITAMIN B-6) 100 mg tablet Take 100 mg by mouth two (2) times a day. nitroglycerin (NITROLINGUAL) 400 mcg/spray spray SPRAY ONE SPRAY UNDER THE TONGUE EVERY 5 MINUTES AS NEEDED    cyanocobalamin (VITAMIN B12) 500 mcg tablet Take 500 mcg by mouth two (2) times a day. folic acid 437 mcg tablet Take 800 mcg by mouth daily. cholecalciferol, vitamin D3, (VITAMIN D3 PO) Take 800 Units by mouth daily. Oxygen 3- 6 liters continuously per nasal cannula. Bifidobacterium Infantis 4 mg cap Take 1 Tab by mouth daily. predniSONE (DELTASONE) 10 mg tablet Take  by mouth daily (with breakfast). guaiFENesin (MUCINEX) 1,200 mg Ta12 ER tablet Take 1,200 mg by mouth two (2) times a day. loratadine (CLARITIN) 10 mg tablet Take 10 mg by mouth daily. azelastine-fluticasone (DYMISTA) 137-50 mcg/spray by Nasal route two (2) times a day. docusate sodium (COLACE) 100 mg capsule Take 100 mg by mouth daily. tamsulosin (FLOMAX) 0.4 mg capsule Take 0.4 mg by mouth two (2) times a day. multivitamin (ONE A DAY) tablet Take 1 Tab by mouth daily. No current facility-administered medications on file prior to visit. Subjective/HPI:   Megha Santos is a 74 y.o. male who was seen by synchronous (real-time) audio-video technology on 12/14/2022.     Cardiac wise he is doing quite well with really no complaints or problems with his medications. He had a couple of probable seizures in October and is now on seizure medication and has an MRI coming up in January. His cancer is stable. For the most part his blood pressure is good and he still wears oxygen when he exerts himself and at night. PCP Provider  Rodriguez Kraft MD  Past Medical History:   Diagnosis Date    Automatic implantable cardiac defibrillator in situ 9/14/2010    Cardiac arrest ([facility] [address] 9/14/2010    ICD (implantable cardiac defibrillator) in place     Other and unspecified hyperlipidemia 9/14/2010    Other specified forms of chronic ischemic heart disease 9/14/2010    Paroxysmal ventricular tachycardia 9/14/2010    PAT (paroxysmal atrial tachycardia) ([facility] [address]     Unspecified cardiovascular disease 9/14/2010    Ventricular fibrillation ([facility] [address] 9/14/2010      Past Surgical History:   Procedure Laterality Date    COLONOSCOPY N/A 4/25/2022    COLONOSCOPY AND UPPER ENDOSCOPY performed by Leigh Boogie MD at [facility] ENDOSCOPY    HX CHOLECYSTECTOMY      6/07    HX HEART CATHETERIZATION      NM EPHYS EVAL PACG CVDFB LDS INITIAL IMPLAN/REPLACE  10/2/2017         NM NASAL SURG PROC UNLISTED      polyps and deviated septum    NM RMVL IMPLTBL DFB PLSE GEN W/RPLCMT PLSE GEN 2 LD  10/2/2017         XR FLUOROSCOPY UNDER 60 MINUTES  10/3/2012          No Known Allergies   Family History   Problem Relation Age of Onset    Heart Disease Neg Hx       Current Outpatient Medications   Medication Sig    Jardiance 10 mg tablet     levETIRAcetam (KEPPRA) 750 mg tablet two (2) times a day. finasteride (PROSCAR) 5 mg tablet Take 5 mg by mouth daily. prochlorperazine (COMPAZINE) 5 mg/mL injection     oxyCODONE IR (ROXICODONE) 5 mg immediate release tablet Take 5 mg by mouth every six (6) hours as needed.     sotaloL (BETAPACE) 120 mg tablet TAKE 1 TABLET TWICE A DAY    atorvastatin (LIPITOR) 80 mg tablet TAKE 1 TABLET NIGHTLY    co-enzyme Q-10 (CO Q-10) 100 mg capsule Take 100 mg by mouth daily. omega 3-dha-epa-fish oil (Fish OiL) 100-160-1,000 mg cap Take 1 Capsule by mouth daily. selenium 200 mcg cap Take 1 Capsule by mouth daily. trimethoprim-sulfamethoxazole (BACTRIM DS, SEPTRA DS) 160-800 mg per tablet Take 1 Tablet by mouth. 1 tablet by mouth twice daily on Monday Wednesday Friday    fluticasone-umeclidinium-vilanterol (Trelegy Ellipta) 100-62.5-25 mcg inhaler Take 1 Puff by inhalation daily. bortezomib (VELCADE INJECTION) by Injection route. Biweekly    zoledronic acid (ZOMETA IV) by IntraVENous route. Every 3 months    apixaban (ELIQUIS) 5 mg tablet Take 5 mg by mouth two (2) times a day. acyclovir (ZOVIRAX) 400 mg tablet     calcium-cholecalciferol, D3, (CALTRATE 600+D) tablet Take 1 Tablet by mouth daily. cyclobenzaprine (FLEXERIL) 10 mg tablet TAKE 1 TABLET BY MOUTH DAILY AS NEEDED    dexAMETHasone (DECADRON) 4 mg tablet 5 tablets by mouth biweekly on Thursdays after VCI    lidocaine (LIDODERM) 5 % 1 Patch every twelve (12) hours. oxyCODONE (OXYIR) 5 mg capsule TAKE 1 CAPSULE BY MOUTH EVERY 6 HOURS AS NEEDED FOR PAIN    OTHER Super beet softchews    pyridoxine, vitamin B6, (VITAMIN B-6) 100 mg tablet Take 100 mg by mouth two (2) times a day. nitroglycerin (NITROLINGUAL) 400 mcg/spray spray SPRAY ONE SPRAY UNDER THE TONGUE EVERY 5 MINUTES AS NEEDED    cyanocobalamin (VITAMIN B12) 500 mcg tablet Take 500 mcg by mouth two (2) times a day. folic acid 922 mcg tablet Take 800 mcg by mouth daily. cholecalciferol, vitamin D3, (VITAMIN D3 PO) Take 800 Units by mouth daily. Oxygen 3- 6 liters continuously per nasal cannula. Bifidobacterium Infantis 4 mg cap Take 1 Tab by mouth daily. predniSONE (DELTASONE) 10 mg tablet Take  by mouth daily (with breakfast). guaiFENesin (MUCINEX) 1,200 mg Ta12 ER tablet Take 1,200 mg by mouth two (2) times a day. loratadine (CLARITIN) 10 mg tablet Take 10 mg by mouth daily.     azelastine-fluticasone (DYMISTA) 137-50 mcg/spray by Nasal route two (2) times a day. docusate sodium (COLACE) 100 mg capsule Take 100 mg by mouth daily. tamsulosin (FLOMAX) 0.4 mg capsule Take 0.4 mg by mouth two (2) times a day. multivitamin (ONE A DAY) tablet Take 1 Tab by mouth daily. No current facility-administered medications for this visit. There were no vitals filed for this visit. Social History     Socioeconomic History    Marital status:      Spouse name: Not on file    Number of children: Not on file    Years of education: Not on file    Highest education level: Not on file   Occupational History    Not on file   Tobacco Use    Smoking status: Former     Types: Cigarettes     Quit date: 2004     Years since quittin.4    Smokeless tobacco: Never   Substance and Sexual Activity    Alcohol use: Not Currently    Drug use: No    Sexual activity: Not on file   Other Topics Concern    Not on file   Social History Narrative    Not on file     Social Determinants of Health     Financial Resource Strain: Not on file   Food Insecurity: Not on file   Transportation Needs: Not on file   Physical Activity: Not on file   Stress: Not on file   Social Connections: Not on file   Intimate Partner Violence: Not on file   Housing Stability: Not on file         negative for chest pain, palpitations, syncope, orthopnea, paroxysmal nocturnal dyspnea, exertional chest pressure/discomfort, claudication, lower extremity edema    Review of Symptoms  11 systems reviewed, negative other than as stated in the HPI    Physical Exam:    Due to this being a TeleHealth evaluation, many elements of the physical examination are unable to be assessed. General: Well developed, in no acute distress, cooperative and alert  HEENT: Pupils equal/round. No marked JVD visible on video.   Respiratory: No audible wheezing, no signs of respiratory distress, lips non cyanotic  Extremities:  No edema  Neuro: A&Ox3, speech clear, no facial droop, answering questions appropriately  Skin: Skin color is normal. No rashes or lesions. Non diaphoretic on visible skin during exam      Cardiology Labs:  Lab Results   Component Value Date/Time    Cholesterol, total 128 05/11/2022 08:35 AM    HDL Cholesterol 59 05/11/2022 08:35 AM    LDL, calculated 51 05/11/2022 08:35 AM    LDL, calculated 79 08/11/2020 08:49 AM    Triglyceride 99 05/11/2022 08:35 AM       Lab Results   Component Value Date/Time    Sodium 137 05/11/2022 08:35 AM    Potassium 4.3 05/11/2022 08:35 AM    Chloride 101 05/11/2022 08:35 AM    CO2 24 05/11/2022 08:35 AM    Anion gap 7 03/01/2010 09:55 AM    Glucose 134 (H) 05/11/2022 08:35 AM    BUN 23 05/11/2022 08:35 AM    Creatinine 0.88 05/11/2022 08:35 AM    BUN/Creatinine ratio 26 (H) 05/11/2022 08:35 AM    GFR est AA 90 11/11/2021 07:56 AM    GFR est non-AA 78 11/11/2021 07:56 AM    Calcium 8.9 05/11/2022 08:35 AM    Bilirubin, total 0.3 05/11/2022 08:35 AM    Alk. phosphatase 58 05/11/2022 08:35 AM    Protein, total 5.6 (L) 05/11/2022 08:35 AM    Albumin 3.8 05/11/2022 08:35 AM    A-G Ratio 2.1 05/11/2022 08:35 AM    ALT (SGPT) 34 05/11/2022 08:35 AM         Assessment:     Diagnoses and all orders for this visit:    1. Atherosclerosis of native coronary artery of native heart without angina pectoris    2. Hyperlipidemia, unspecified hyperlipidemia type    3. ICD (implantable cardioverter-defibrillator) in place    4. Cardiomyopathy, unspecified type ([facility] [address]      ICD-10-CM ICD-9-CM    1. Atherosclerosis of native coronary artery of native heart without angina pectoris  I25.10 414.01       2. Hyperlipidemia, unspecified hyperlipidemia type  E78.5 272.4       3. ICD (implantable cardioverter-defibrillator) in place  Z95.810 V45.02       4. Cardiomyopathy, unspecified type ([facility]  I42.9 425.4         Orders Placed This Encounter    Jardiance 10 mg tablet    levETIRAcetam (KEPPRA) 750 mg tablet     Sig: two (2) times a day.     finasteride (PROSCAR) 5 mg tablet     Sig: Take 5 mg by mouth daily. prochlorperazine (COMPAZINE) 5 mg/mL injection    DISCONTD: predniSONE (DELTASONE) 10 mg tablet    oxyCODONE IR (ROXICODONE) 5 mg immediate release tablet     Sig: Take 5 mg by mouth every six (6) hours as needed. Plan:     He is stable and asymptomatic at this point well compensated on a good medical regimen. He is can go to his primary care which is much closer for his wife to drive since he is on driving restrictions for 6 months, and get an EKG. He will also get a lipid profile with his next oncology blood work. current treatment plan is effective, no change in therapy  lab results and schedule of future lab studies reviewed with patient  reviewed diet, exercise and weight control  We discussed the expected course, resolution and complications of the diagnosis(es) in detail. Medication risks, benefits, costs, interactions, and alternatives were discussed as indicated. I advised him to contact the office if his condition worsens, changes or fails to improve as anticipated. He expressed understanding with the diagnosis(es) and plan    Shamir Szymanski MD  Follow-up and Dispositions    Return in about 4 months (around 4/14/2023). Greater than 20 minutes was spent in direct video patient care, planning and chart review. This visit was conducted using [employer] telemedicine services.

## 2022-12-14 NOTE — TELEPHONE ENCOUNTER
Called pt. Verified patient's identity with two identifiers. Scheduled April appointment. I let him know order for lipid profile placed. Patient verbalized understanding and denied further questions or concerns.

## 2023-01-09 ENCOUNTER — OFFICE VISIT (OUTPATIENT)
Dept: CARDIOLOGY CLINIC | Age: 73
End: 2023-01-09
Payer: MEDICARE

## 2023-01-09 DIAGNOSIS — Z95.810 PRESENCE OF CARDIOVERTER DEFIBRILLATOR: Primary | ICD-10-CM

## 2023-01-31 NOTE — PROGRESS NOTES
C/ LYN REMOTE   Scheduled remote transmission. Device functioning appropriately as programmed. See scanned documents.  Chargeable visit

## 2023-03-07 DIAGNOSIS — I49.01 VENTRICULAR FIBRILLATION (HCC): ICD-10-CM

## 2023-03-07 DIAGNOSIS — I25.10 CARDIOVASCULAR DISEASE: ICD-10-CM

## 2023-03-07 DIAGNOSIS — Z95.810 ICD (IMPLANTABLE CARDIOVERTER-DEFIBRILLATOR) IN PLACE: ICD-10-CM

## 2023-03-07 DIAGNOSIS — I25.9 ISCHEMIC HEART DISEASE: ICD-10-CM

## 2023-03-07 DIAGNOSIS — I47.1 PAT (PAROXYSMAL ATRIAL TACHYCARDIA) (HCC): ICD-10-CM

## 2023-03-07 DIAGNOSIS — I47.29 PAROXYSMAL VENTRICULAR TACHYCARDIA: ICD-10-CM

## 2023-03-07 DIAGNOSIS — I46.9 CARDIAC ARREST (HCC): ICD-10-CM

## 2023-03-07 DIAGNOSIS — I42.9 CARDIOMYOPATHY, UNSPECIFIED TYPE (HCC): ICD-10-CM

## 2023-03-07 DIAGNOSIS — E78.5 HYPERLIPIDEMIA, UNSPECIFIED HYPERLIPIDEMIA TYPE: ICD-10-CM

## 2023-03-07 RX ORDER — SOTALOL HYDROCHLORIDE 120 MG/1
TABLET ORAL
Qty: 180 TABLET | Refills: 1 | Status: SHIPPED | OUTPATIENT
Start: 2023-03-07

## 2023-03-07 NOTE — TELEPHONE ENCOUNTER
Requested Prescriptions     Signed Prescriptions Disp Refills    sotaloL (BETAPACE) 120 mg tablet 180 Tablet 1     Sig: TAKE 1 TABLET TWICE A DAY     Authorizing Provider: Laney Wilson     Ordering User: Red Curry    Per Dr. Barbi Ho verbal orders

## 2023-04-23 DIAGNOSIS — E78.5 HYPERLIPIDEMIA, UNSPECIFIED HYPERLIPIDEMIA TYPE: Primary | ICD-10-CM

## 2023-08-07 NOTE — PROGRESS NOTES
Overall Chol is ok, although good chol is a little low and glucose is elevated. Need exercise and wt loss.  Stay on meds and repeat 6mos unable to assess

## 2023-11-22 NOTE — TELEPHONE ENCOUNTER
Please see message below and advise if okay. Clearance likely to include holding plavix and aspirin. If okay, how many days can he hold? no

## 2025-06-10 NOTE — TELEPHONE ENCOUNTER
Requested Prescriptions     Signed Prescriptions Disp Refills    atorvastatin (LIPITOR) 80 mg tablet 90 Tab 3     Sig: TAKE 1 TABLET NIGHTLY     Authorizing Provider: Eleazar Neumann     Ordering User: Frank Sandhoff    Per Dr. Daniel Guzmán verbal orders
Fall Risk

## (undated) DEVICE — SNARE ENDOSCP M L240CM W27MM SHTH DIA2.4MM CHN 2.8MM OVL

## (undated) DEVICE — TRAP SURG QUAD PARABOLA SLOT DSGN SFTY SCRN TRAPEASE

## (undated) DEVICE — FORCEPS BX L240CM JAW DIA2.8MM L CAP W/ NDL MIC MESH TOOTH

## (undated) DEVICE — TUBING HYDR IRR --